# Patient Record
Sex: MALE | Race: WHITE | Employment: OTHER | RURAL
[De-identification: names, ages, dates, MRNs, and addresses within clinical notes are randomized per-mention and may not be internally consistent; named-entity substitution may affect disease eponyms.]

---

## 2017-02-06 ENCOUNTER — OFFICE VISIT (OUTPATIENT)
Dept: FAMILY MEDICINE CLINIC | Age: 82
End: 2017-02-06

## 2017-02-06 VITALS
DIASTOLIC BLOOD PRESSURE: 60 MMHG | HEART RATE: 68 BPM | RESPIRATION RATE: 18 BRPM | SYSTOLIC BLOOD PRESSURE: 126 MMHG | OXYGEN SATURATION: 99 %

## 2017-02-06 DIAGNOSIS — H60.503 ACUTE NONINFECTIVE OTITIS EXTERNA OF BOTH EARS, UNSPECIFIED TYPE: Primary | ICD-10-CM

## 2017-02-06 RX ORDER — NEOMYCIN SULFATE, POLYMYXIN B SULFATE AND HYDROCORTISONE 10; 3.5; 1 MG/ML; MG/ML; [USP'U]/ML
3 SUSPENSION/ DROPS AURICULAR (OTIC) 4 TIMES DAILY
Qty: 5 ML | Refills: 1 | Status: SHIPPED | OUTPATIENT
Start: 2017-02-06 | End: 2017-02-16

## 2017-02-18 NOTE — PROGRESS NOTES
2/18/2017    Chief Complaint   Patient presents with    Wax in Ear     Bilateral ears impacted with cerumen       HPI: Mr.Ernest Fairy Olszewski is a 80 y.o. male. 81 y/o resident of the [facility]. His wife is a resident in the [facility]. He has a history that is significant for chronic atrial fibrillation, implanted pacemaker, long term anticoagulation on Warfarin. Follows with Dr. Rocio Arroyo. His daughter Antonio Adames is the POA and is very attentive to his needs. Presents today with impacted cerumen bilaterally. Has been using Dubrox for past few days. Allergies   Allergen Reactions    Hydrocodone Nausea Only       Current Outpatient Prescriptions   Medication Sig Dispense Refill    rivaroxaban (XARELTO) 10 mg tablet Take 1 Tab by mouth daily. Indications: PREVENT THROMBOEMBOLISM IN CHRONIC ATRIAL FIBRILLATION 30 Tab 11    clopidogrel (PLAVIX) 75 mg tab Take 1 Tab by mouth daily. 30 Tab 11    atorvastatin (LIPITOR) 80 mg tablet Take 1 Tab by mouth daily. 30 Tab 11    famotidine (PEPCID) 20 mg tablet Take 1 Tab by mouth two (2) times a day. 30 Tab 11    albuterol (PROVENTIL VENTOLIN) 2.5 mg /3 mL (0.083 %) nebulizer solution 2.5 mg by Nebulization route as needed for Wheezing. Every 6 hrs prn SOB.  hydrocortisone (ANUSOL-HC) 2.5 % rectal cream Insert  into rectum as needed for Hemorrhoids.  docusate sodium (COLACE) 100 mg capsule Take 100 mg by mouth daily.  traMADol (ULTRAM) 50 mg tablet Take 50 mg by mouth every eight (8) hours as needed for Pain.  sodium chloride (OCEAN) 0.65 % nasal spray 2 Sprays by Both Nostrils route four (4) times daily.  calcium polycarbophil (FIBER-TABS) 625 mg tablet Take 1 Tab by mouth two (2) times a day. Indications: DIARRHEA 60 Tab 2    finasteride (PROSCAR) 5 mg tablet Take 1 Tab by mouth daily. Indications: SYMPTOMATIC BENIGN PROSTATIC HYPERPLASIA 30 Tab 5    Protein Supplement liqd Take 1 CUP by mouth two (2) times a day.  60 Bottle 5    cyanocobalamin (VITAMIN B12) 1,000 mcg/mL injection 1,000 mcg by IntraMUSCular route every month.  mineral oil (FLEET) enema Insert  into rectum as needed for Constipation.  glycerin, adult, (SUPPOSITORY ADULT) suppository Insert 1 Suppository into rectum as needed.  OTHER Spiritus Anant ([facility]) May keep wine in room.  multivitamin (ONE A DAY) tablet Take 1 Tab by mouth daily.  acetaminophen (TYLENOL) 325 mg tablet Take 325 mg by mouth two (2) times a day. Take two tabs po bid.  levothyroxine (SYNTHROID) 75 mcg tablet Take  by mouth Daily (before breakfast).  loratadine (CLARITIN) 10 mg tablet Take 10 mg by mouth daily as needed.  acetaminophen (TYLENOL) 80 mg suppository Insert 650 mg into rectum every four (4) hours as needed for Fever.  OTHER Natural Laxative 30cc po every day      magnesium hydroxide (CLEMENS MILK OF MAGNESIA) 400 mg/5 mL suspension Take 30 mL by mouth daily as needed for Constipation.  alum-mag hydroxide-simeth (MAALOX ADVANCED) 200-200-20 mg/5 mL susp Take 30 mL by mouth every four (4) hours as needed. Past Medical History   Diagnosis Date    Anemia     Arrhythmia      Afib    Depression     Hypercholesterolemia     Hypertension     NSTEMI (non-ST elevated myocardial infarction) ([facility] [place] [address] 11/21/2016     [facility]:  JACKELIN to SVG-L-PLB and JACKELIN to SVG-LAD    Pacemaker 2009     Followed By Dr Dianelys Miramontes S/P CABG x 4 1995    Thyroid disease      hypothyroidism       Lab Results   Component Value Date/Time    Glucose 98 12/15/2016 03:11 PM    Creatinine 1.20 12/15/2016 03:11 PM       ROS:  Constitutional: No fever, chills or weight loss  Respiratory: No cough, SOB   CV: No chest pain or Palpitations  GI: No nausea, vomiting or diarrhea. : No dysuria or hematuria. Neuro: No headaches, seizures, change in mental status.     Physical Exam:   VS Visit Vitals    /60    Pulse 68    Resp 18    SpO2 99%      Eyes Conjunctiva and lids normal.    PERRLA, EOMI.   ENMT Bilateral  External canals are impacted with cerumen. Irrigated with water until clear. The external canals are erythematous along the base. Lips, teeth, gums normal, mucous membranes moist.    Oropharynx: no erythema, no exudates, no lesions, normal tongue. NECK Thyroid: normal size, nontender. Trachea midline, neck symmetrical and without masses. Carotids 2+ with no bruits. No enlarged nodes. RESP Clear to auscultation and percussion. No rales, wheezes, rhonchi, or rubs. CV RRR, with no S3 or S4, no murmur, no rub. Aorta: no bruit. SKIN Skin warm, normal turgor. 1. Acute noninfective otitis externa of both ears, unspecified type  - neomycin-polymyxin-hydrocortisone, buffered, (PEDIOTIC) 3.5-10,000-1 mg/mL-unit/mL-% otic suspension; Administer 3 Drops in left ear four (4) times daily for 10 days. Dispense: 5 mL; Refill: 1        Orders Placed This Encounter    neomycin-polymyxin-hydrocortisone, buffered, (PEDIOTIC) 3.5-10,000-1 mg/mL-unit/mL-% otic suspension     Sig: Administer 3 Drops in left ear four (4) times daily for 10 days. Dispense:  5 mL     Refill:  1       Follow-up Disposition:  Return if symptoms worsen or fail to improve.         MARVA Coleman

## 2017-03-01 ENCOUNTER — OFFICE VISIT (OUTPATIENT)
Dept: FAMILY MEDICINE CLINIC | Age: 82
End: 2017-03-01

## 2017-03-01 VITALS
SYSTOLIC BLOOD PRESSURE: 140 MMHG | BODY MASS INDEX: 21.67 KG/M2 | OXYGEN SATURATION: 98 % | HEART RATE: 66 BPM | RESPIRATION RATE: 20 BRPM | WEIGHT: 151 LBS | DIASTOLIC BLOOD PRESSURE: 70 MMHG

## 2017-03-01 DIAGNOSIS — F33.8 SEASONAL AFFECTIVE DISORDER (HCC): Primary | ICD-10-CM

## 2017-03-01 DIAGNOSIS — J30.1 SEASONAL ALLERGIC RHINITIS DUE TO POLLEN: ICD-10-CM

## 2017-03-01 DIAGNOSIS — F32.0 MILD SINGLE CURRENT EPISODE OF MAJOR DEPRESSIVE DISORDER (HCC): ICD-10-CM

## 2017-03-01 DIAGNOSIS — R63.4 UNINTENTIONAL WEIGHT LOSS: ICD-10-CM

## 2017-03-01 RX ORDER — ESCITALOPRAM OXALATE 5 MG/1
5 TABLET ORAL DAILY
Qty: 30 TAB | Refills: 2 | Status: SHIPPED | OUTPATIENT
Start: 2017-03-01

## 2017-03-01 RX ORDER — LORATADINE 10 MG/1
10 TABLET ORAL DAILY
Qty: 30 TAB | Refills: 5 | Status: SHIPPED | OUTPATIENT
Start: 2017-03-01

## 2017-03-01 NOTE — PROGRESS NOTES
3/1/2017    Chief Complaint   Patient presents with    Depression     Feeling kind of blue about 1/2 the time. Not as active during the winter months and not playing cards with friends anymore. HPI: Omar Vicente is a 80 y.o. male. 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 Resident in [employer]. His wife is a resident in the [facility] due to Advanced Dementia with some behavioral issues. Very attentive daughter who visits almost every day. He has been an avid golfer. His daughter takes him when weather permits. He had a NSTEMI on 11/21/2016. He was admitted to [facility]. His cardiologist is Dr. Checo Mei. He has a history of atrial fib with PPM. He has no underlying rhythm and is paced 99.9% of time. He is on Xarelto and Plavix. He is going to Cardiac Rehab and is doing well. He should complete Cardiac Rehab at the end of March. Since the NSTEMI and admission in 11/16 he has lost some weight. His weight has stabilized around 150-154. He is getting Mighty Shakes BID. He has also had some dental problems which has made eating more difficult, especially things like meat. He does like milk shakes, the real kind, chocolate. Presents today for depression. He has been more depressed over the past couple months. Previously he was on Sertraline but it was discontinued due to unstable gait. Considered adding Mirtazapine for his weight but there were concerns about is cardiovascular side effects. Issues related to the depression include the fact that his wife and he are  with her in the [facility] and he in [address] [place] [address]. She often pleads with him not to leave which is hard on him. He also misses her companionship, ability to have a conversation or just be close. He would like to get out and play some golf. His daughter is concerned about his fall risk walking on the uneven ground when he walks from the cart to the ball lay.  Satya Martinez would like to defer golfing until he finishes cardiac rehab. The weather is warming up and that is reasonable. Another consideration is the affect of the gray winter with Seasonal Affect Disorder. Discussed options and he would like to try Lexapro. Would start a low dose. Consider a 3 month course and wean off. He has started to have some seasonal allergies. He has Claritin 10mg ordered PRN. Will schedule this for a couple of months. Allergies   Allergen Reactions    Hydrocodone Nausea Only       Current Outpatient Prescriptions   Medication Sig Dispense Refill    escitalopram oxalate (LEXAPRO) 5 mg tablet Take 1 Tab by mouth daily. 30 Tab 2    loratadine (CLARITIN) 10 mg tablet Take 1 Tab by mouth daily. Indications: ALLERGIC RHINITIS 30 Tab 5    sodium chloride (OCEAN) 0.65 % nasal spray 2 Sprays by Both Nostrils route four (4) times daily.  Protein Supplement liqd Take 1 CUP by mouth two (2) times a day. 60 Bottle 5    cyanocobalamin (VITAMIN B12) 1,000 mcg/mL injection 1,000 mcg by IntraMUSCular route every month.  rivaroxaban (XARELTO) 10 mg tablet Take 1 Tab by mouth daily. Indications: PREVENT THROMBOEMBOLISM IN CHRONIC ATRIAL FIBRILLATION 30 Tab 11    clopidogrel (PLAVIX) 75 mg tab Take 1 Tab by mouth daily. 30 Tab 11    atorvastatin (LIPITOR) 80 mg tablet Take 1 Tab by mouth daily. 30 Tab 11    famotidine (PEPCID) 20 mg tablet Take 1 Tab by mouth two (2) times a day. 30 Tab 11    albuterol (PROVENTIL VENTOLIN) 2.5 mg /3 mL (0.083 %) nebulizer solution 2.5 mg by Nebulization route as needed for Wheezing. Every 6 hrs prn SOB.  hydrocortisone (ANUSOL-HC) 2.5 % rectal cream Insert  into rectum as needed for Hemorrhoids.  docusate sodium (COLACE) 100 mg capsule Take 100 mg by mouth daily.  traMADol (ULTRAM) 50 mg tablet Take 50 mg by mouth every eight (8) hours as needed for Pain.  calcium polycarbophil (FIBER-TABS) 625 mg tablet Take 1 Tab by mouth two (2) times a day.  Indications: DIARRHEA 60 Tab 2    finasteride (PROSCAR) 5 mg tablet Take 1 Tab by mouth daily. Indications: SYMPTOMATIC BENIGN PROSTATIC HYPERPLASIA 30 Tab 5    mineral oil (FLEET) enema Insert  into rectum as needed for Constipation.  glycerin, adult, (SUPPOSITORY ADULT) suppository Insert 1 Suppository into rectum as needed.  OTHER [facility] ([facility]) May keep wine in room.  multivitamin (ONE A DAY) tablet Take 1 Tab by mouth daily.  acetaminophen (TYLENOL) 325 mg tablet Take 325 mg by mouth every four (4) hours as needed for Pain. Take two tabs po bid.  levothyroxine (SYNTHROID) 75 mcg tablet Take  by mouth Daily (before breakfast).  acetaminophen (TYLENOL) 80 mg suppository Insert 650 mg into rectum every four (4) hours as needed for Fever.  OTHER Natural Laxative 30cc po every day      magnesium hydroxide (CLEMENS MILK OF MAGNESIA) 400 mg/5 mL suspension Take 30 mL by mouth daily as needed for Constipation.  alum-mag hydroxide-simeth (MAALOX ADVANCED) 200-200-20 mg/5 mL susp Take 30 mL by mouth every four (4) hours as needed. Past Medical History:   Diagnosis Date    Anemia     Arrhythmia     Afib    Depression     Hypercholesterolemia     Hypertension     NSTEMI (non-ST elevated myocardial infarction) ([facility] [address] 11/21/2016    [facility]:  JACKELIN to SVG-L-PLB and JACKELIN to SVG-LAD    Pacemaker 2009    Followed By Dr Fam Villeda S/P CABG x 4 1995    Thyroid disease     hypothyroidism       Lab Results   Component Value Date/Time    Glucose 98 12/15/2016 03:11 PM    Creatinine 1.20 12/15/2016 03:11 PM       ROS:  Constitutional: No fever, chills or weight loss  Respiratory: No cough, SOB   CV: No chest pain or Palpitations  GI: No nausea, vomiting or diarrhea. : No dysuria or hematuria. Neuro: No headaches, seizures, change in mental status.     Physical Exam:   VS Visit Vitals    /70 (BP 1 Location: Right arm, BP Patient Position: Sitting)    Pulse 66    Resp 20    Wt 151 lb (68.5 kg)    SpO2 98%    BMI 21.67 kg/m2      Genreal  Alert, oriented WM. Ambulates with a rollator. Steady gait. He is here with Bird Mccoy his daughter. ENMT Mucous membranes moist.    Oropharynx: no erythema, no exudates, no lesions, normal tongue. RESP Clear to auscultation and percussion. No rales, wheezes, rhonchi, or rubs. CV RRR. EXT No deformity. Extremities without edema. SKIN Skin warm, normal turgor. NEURO Cranial nerves normal 2-12. No abnormal movement     PSYCH Judgment and insight fair. Some memory deficit but is quite cognizant. Oriented to person, place, and time. Affect is alert and attentive. 1. [facility]  Discussed and he would like to try medication. - escitalopram oxalate (LEXAPRO) 5 mg tablet; Take 1 Tab by mouth daily. Dispense: 30 Tab; Refill: 2    2. Mild single current episode of major depressive disorder ([facility] [address]  As above. Offer support. His wife may seem upset when he leaves, but she has a very poor short term memory and she will not remember that he has left. It is harder on him when he leaves because he worries and may even feel guilty like he is abandoning her. She is in the best place for care, she is safe and he is providing excellent care for her.     - escitalopram oxalate (LEXAPRO) 5 mg tablet; Take 1 Tab by mouth daily. Dispense: 30 Tab; Refill: 2    3. Seasonal allergic rhinitis due to pollen  Schedule Claritin for a couple of months. - loratadine (CLARITIN) 10 mg tablet; Take 1 Tab by mouth daily. Indications: ALLERGIC RHINITIS  Dispense: 30 Tab; Refill: 5    4. Weight loss  Regular chocolate milkshake in addition to the [employer] nutritional supplement. Orders Placed This Encounter    escitalopram oxalate (LEXAPRO) 5 mg tablet     Sig: Take 1 Tab by mouth daily. Dispense:  30 Tab     Refill:  2    loratadine (CLARITIN) 10 mg tablet     Sig: Take 1 Tab by mouth daily.  Indications: ALLERGIC RHINITIS     Dispense:  30 Tab     Refill:  5       Follow-up Disposition:  Return if symptoms worsen or fail to improve.     Time spent with patient 30min    Rosanna Her

## 2017-04-26 ENCOUNTER — OFFICE VISIT (OUTPATIENT)
Dept: FAMILY MEDICINE CLINIC | Age: 82
End: 2017-04-26

## 2017-04-26 VITALS
HEART RATE: 68 BPM | DIASTOLIC BLOOD PRESSURE: 51 MMHG | SYSTOLIC BLOOD PRESSURE: 113 MMHG | OXYGEN SATURATION: 96 % | RESPIRATION RATE: 20 BRPM | TEMPERATURE: 97.8 F

## 2017-04-26 DIAGNOSIS — H66.002 ACUTE SUPPURATIVE OTITIS MEDIA OF LEFT EAR WITHOUT SPONTANEOUS RUPTURE OF TYMPANIC MEMBRANE, RECURRENCE NOT SPECIFIED: Primary | ICD-10-CM

## 2017-04-26 RX ORDER — AMOXICILLIN AND CLAVULANATE POTASSIUM 875; 125 MG/1; MG/1
1 TABLET, FILM COATED ORAL 2 TIMES DAILY
Qty: 20 TAB | Refills: 0 | Status: SHIPPED | OUTPATIENT
Start: 2017-04-26 | End: 2017-05-06

## 2017-04-26 NOTE — PROGRESS NOTES
4/26/2017    Chief Complaint   Patient presents with    Wax in Ear     bilateral ear wax. HPI: Mr.Ernest Alejandra Purdy is a 80 y.o. male. 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 His wife is a resident in the [facility] due to Advanced Dementia with some behavioral issues. Very attentive daughter who visits almost every day. He has been an avid golfer. His daughter takes him when weather permits. He had a NSTEMI on 11/21/2016. He was admitted to [address] [place] [address]. His cardiologist is Dr. Azeem Terrell. He has a history of atrial fib with PPM. He has no underlying rhythm and is paced 99.9% of time. He is on Xarelto and Plavix. He is going to Cardiac Rehab and is doing well. He should complete Cardiac Rehab at the end of March. Since the NSTEMI and admission in 11/16 he has lost some weight. His weight has stabilized around 150-154. He is getting Mighty Shakes BID. He has also had some dental problems which has made eating more difficult, especially things like meat. He does like milk shakes, the real kind, chocolate. Presents today for left ear discomfort. Seen in clinic and told he had a little wax. He has been using Dubrox. He had an infection some time ago and it feels similar. Allergies   Allergen Reactions    Hydrocodone Nausea Only       Current Outpatient Prescriptions   Medication Sig Dispense Refill    amoxicillin-clavulanate (AUGMENTIN) 875-125 mg per tablet Take 1 Tab by mouth two (2) times a day for 10 days. 20 Tab 0    escitalopram oxalate (LEXAPRO) 5 mg tablet Take 1 Tab by mouth daily. 30 Tab 2    loratadine (CLARITIN) 10 mg tablet Take 1 Tab by mouth daily. Indications: ALLERGIC RHINITIS 30 Tab 5    rivaroxaban (XARELTO) 10 mg tablet Take 1 Tab by mouth daily. Indications: PREVENT THROMBOEMBOLISM IN CHRONIC ATRIAL FIBRILLATION (Patient taking differently: Take 15 mg by mouth daily.  Indications: PREVENT THROMBOEMBOLISM IN CHRONIC ATRIAL FIBRILLATION) 30 Tab 11    clopidogrel (PLAVIX) 75 mg tab Take 1 Tab by mouth daily. 30 Tab 11    atorvastatin (LIPITOR) 80 mg tablet Take 1 Tab by mouth daily. 30 Tab 11    famotidine (PEPCID) 20 mg tablet Take 1 Tab by mouth two (2) times a day. (Patient taking differently: Take 20 mg by mouth daily.) 30 Tab 11    calcium polycarbophil (FIBER-TABS) 625 mg tablet Take 1 Tab by mouth two (2) times a day. Indications: DIARRHEA 60 Tab 2    finasteride (PROSCAR) 5 mg tablet Take 1 Tab by mouth daily. Indications: SYMPTOMATIC BENIGN PROSTATIC HYPERPLASIA 30 Tab 5    Protein Supplement liqd Take 1 CUP by mouth two (2) times a day. 60 Bottle 5    cyanocobalamin (VITAMIN B12) 1,000 mcg/mL injection 1,000 mcg by IntraMUSCular route every month.  OTHER Spiritus Fermenti ([address] [place] [address]) May keep wine in room.  multivitamin (ONE A DAY) tablet Take 1 Tab by mouth daily.  levothyroxine (SYNTHROID) 75 mcg tablet Take  by mouth Daily (before breakfast).  albuterol (PROVENTIL VENTOLIN) 2.5 mg /3 mL (0.083 %) nebulizer solution 2.5 mg by Nebulization route as needed for Wheezing. Every 6 hrs prn SOB.  hydrocortisone (ANUSOL-HC) 2.5 % rectal cream Insert  into rectum as needed for Hemorrhoids.  traMADol (ULTRAM) 50 mg tablet Take 50 mg by mouth every eight (8) hours as needed for Pain.  sodium chloride (OCEAN) 0.65 % nasal spray 2 Sprays by Both Nostrils route four (4) times daily.  mineral oil (FLEET) enema Insert  into rectum as needed for Constipation.  glycerin, adult, (SUPPOSITORY ADULT) suppository Insert 1 Suppository into rectum as needed.  acetaminophen (TYLENOL) 325 mg tablet Take 325 mg by mouth every four (4) hours as needed for Pain. Take two tabs po bid.  acetaminophen (TYLENOL) 80 mg suppository Insert 650 mg into rectum every four (4) hours as needed for Fever.       OTHER Natural Laxative 30cc po every day      magnesium hydroxide (CLEMENS MILK OF MAGNESIA) 400 mg/5 mL suspension Take 30 mL by mouth daily as needed for Constipation.  alum-mag hydroxide-simeth (MAALOX ADVANCED) 200-200-20 mg/5 mL susp Take 30 mL by mouth every four (4) hours as needed. Past Medical History:   Diagnosis Date    Anemia     Arrhythmia     Afib    Depression     Hypercholesterolemia     Hypertension     NSTEMI (non-ST elevated myocardial infarction) ([facility] [address] 11/21/2016    [facility]:  JACKELIN to SVG-L-PLB and JACKELIN to SVG-LAD    Pacemaker 2009    Followed By Dr Chelsea Benoit S/P CABG x 4 1995    Thyroid disease     hypothyroidism       Lab Results   Component Value Date/Time    Glucose 98 12/15/2016 03:11 PM    Creatinine 1.20 12/15/2016 03:11 PM       ROS:  Constitutional: No fever, chills or weight loss  Respiratory: No cough, SOB   CV: No chest pain or Palpitations  GI: No nausea, vomiting or diarrhea. : No dysuria or hematuria. Neuro: No headaches, seizures, change in mental status. Physical Exam:   VS Visit Vitals    /51    Pulse 68    Temp 97.8 °F (36.6 °C)    Resp 20    SpO2 96%      General  Alert, oriented to person, place and time. Pleasantly confused. Eyes Conjunctiva and lids normal.    PERRLA, EOMI.   ENMT External ears and nose normal.  Left Canal has small amount of wax. Right canal is clear. Left ear irrigated and TM is bright erythema. Left TM normal.   Mucous membranes moist.    Oropharynx: no erythema, no exudates, no lesions, normal tongue. NECK Thyroid: normal size, nontender. Trachea midline, neck symmetrical and without masses. Carotids 2+ with no bruits. No enlarged nodes. RESP Clear to auscultation and percussion. No rales, wheezes, rhonchi, or rubs. CV RRR, with no S3 or S4, no murmur, no rub. SKIN Skin warm, normal turgor. NEURO Cranial nerves normal 2-12. PSYCH Judgment and insight good. Oriented to person, place, and time. Affect is alert and attentive.        1. Acute suppurative otitis media of left ear without spontaneous rupture of tympanic membrane, recurrence not specified  2. Impacted cerumen on left. Irrigated and TM reveals OM as noted. - amoxicillin-clavulanate (AUGMENTIN) 875-125 mg per tablet; Take 1 Tab by mouth two (2) times a day for 10 days. Dispense: 20 Tab; Refill: 0        Orders Placed This Encounter    amoxicillin-clavulanate (AUGMENTIN) 875-125 mg per tablet     Sig: Take 1 Tab by mouth two (2) times a day for 10 days. Dispense:  20 Tab     Refill:  0       Follow-up Disposition:  Return if symptoms worsen or fail to improve.         MARVA Beckwith

## 2017-12-07 ENCOUNTER — OFFICE VISIT (OUTPATIENT)
Dept: ONCOLOGY | Age: 82
End: 2017-12-07

## 2017-12-07 VITALS
DIASTOLIC BLOOD PRESSURE: 67 MMHG | SYSTOLIC BLOOD PRESSURE: 135 MMHG | BODY MASS INDEX: 22.13 KG/M2 | TEMPERATURE: 96.1 F | HEART RATE: 95 BPM | HEIGHT: 70 IN | WEIGHT: 154.6 LBS | RESPIRATION RATE: 16 BRPM

## 2017-12-07 DIAGNOSIS — D63.1 ANEMIA IN STAGE 3 CHRONIC KIDNEY DISEASE (HCC): ICD-10-CM

## 2017-12-07 DIAGNOSIS — I48.20 CHRONIC ATRIAL FIBRILLATION (HCC): Primary | ICD-10-CM

## 2017-12-07 DIAGNOSIS — N18.30 ANEMIA IN STAGE 3 CHRONIC KIDNEY DISEASE (HCC): ICD-10-CM

## 2017-12-07 DIAGNOSIS — Z95.1 S/P CABG X 4: ICD-10-CM

## 2017-12-07 DIAGNOSIS — D50.9 IRON DEFICIENCY ANEMIA, UNSPECIFIED IRON DEFICIENCY ANEMIA TYPE: ICD-10-CM

## 2017-12-07 DIAGNOSIS — I21.4 NSTEMI (NON-ST ELEVATED MYOCARDIAL INFARCTION) (HCC): ICD-10-CM

## 2017-12-07 RX ORDER — NITROGLYCERIN 0.4 MG/1
TABLET SUBLINGUAL
COMMUNITY

## 2017-12-07 NOTE — PROGRESS NOTES
Kwadwo Wilkerson is a 80 y.o. male here today to see Dr concerning anemia. 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

## 2017-12-07 NOTE — MR AVS SNAPSHOT
Visit Information Date & Time Provider Department Dept. Phone Encounter #  
 12/7/2017  2:00 PM Avinash Read MD [facility] at [address] [place] [address] [phone] [id number] Follow-up Instructions Return in 4 days (on 12/11/2017) for Office visit. Your Appointments 12/11/2017  9:30 AM  
Follow Up with Avinash Read MD  
[facility] at [address] [place] [address]) Appt Note: 4Day F/U  
 [address] [facility] [phone] [id number] [phone]  
  
   
 [address] Upcoming Health Maintenance Date Due Pneumococcal 65+ Low/Medium Risk (2 of 2 - PPSV23) 9/11/2016 MEDICARE YEARLY EXAM 3/18/2017 Influenza Age 5 to Adult 8/1/2017 GLAUCOMA SCREENING Q2Y 8/20/2019 DTaP/Tdap/Td series (2 - Td) 8/20/2027 Allergies as of 12/7/2017  Review Complete On: 12/7/2017 By: Zoey Valenzuela RN Severity Noted Reaction Type Reactions Hydrocodone  05/06/2015    Nausea Only Current Immunizations  Never Reviewed Name Date Influenza Vaccine 10/15/2015 Pneumococcal Conjugate (PCV-13) 9/11/2015 Not reviewed this visit You Were Diagnosed With   
  
 Codes Comments Chronic atrial fibrillation (HCC)    -  Primary ICD-10-CM: O90.8 ICD-9-CM: 427.31   
 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  
 22.18 kg/m 2 1.86 m 2 Preferred Pharmacy Pharmacy Name Phone [address] [facility]. [phone] Your Updated Medication List  
  
   
This list is accurate as of: 12/7/17  4:23 PM.  Always use your most recent med list.  
  
  
  
  
 albuterol 2.5 mg /3 mL (0.083 %) nebulizer solution Commonly known as:  PROVENTIL VENTOLIN  
2.5 mg by Nebulization route as needed for Wheezing. Every 6 hrs prn SOB. atorvastatin 80 mg tablet Commonly known as:  LIPITOR Take 1 Tab by mouth daily. calcium polycarbophil 625 mg tablet Commonly known as:  FIBER-TABS Take 1 Tab by mouth two (2) times a day. Indications: DIARRHEA  
  
 clopidogrel 75 mg Tab Commonly known as:  PLAVIX Take 1 Tab by mouth daily. cyanocobalamin 1,000 mcg/mL injection Commonly known as:  VITAMIN B12  
1,000 mcg by IntraMUSCular route every month. Every 3 weeks  
  
 escitalopram oxalate 5 mg tablet Commonly known as:  Barbara Levo Take 1 Tab by mouth daily. famotidine 20 mg tablet Commonly known as:  PEPCID Take 1 Tab by mouth two (2) times a day. Ferrous Fumarate 325 mg (106 mg iron) Tab Take  by mouth daily. finasteride 5 mg tablet Commonly known as:  PROSCAR Take 1 Tab by mouth daily. Indications: SYMPTOMATIC BENIGN PROSTATIC HYPERPLASIA  
  
 hydrocortisone 2.5 % rectal cream  
Commonly known as:  ANUSOL-HC Insert  into rectum as needed for Hemorrhoids. loratadine 10 mg tablet Commonly known as:  Iza Cleveland Take 1 Tab by mouth daily. Indications: ALLERGIC RHINITIS MAALOX ADVANCED 200-200-20 mg/5 mL Susp Generic drug:  alum-mag hydroxide-simeth Take 30 mL by mouth every four (4) hours as needed. mineral oil enema Commonly known as:  FLEET Insert  into rectum as needed for Constipation. multivitamin tablet Commonly known as:  ONE A DAY Take 1 Tab by mouth daily. nitroglycerin 0.4 mg SL tablet Commonly known as:  NITROSTAT  
by SubLINGual route every five (5) minutes as needed for Chest Pain (not to exceed 3 doses). * OTHER Spiritus Fermenti ([facility]) May keep wine in room. * OTHER Natural Laxative 30cc po every day CLEMENS MILK OF MAGNESIA 400 mg/5 mL suspension Generic drug:  magnesium hydroxide Take 30 mL by mouth daily as needed for Constipation. Protein Supplement Liqd Take 1 CUP by mouth two (2) times a day. rivaroxaban 10 mg tablet Commonly known as:  Donovan Panda Take 1 Tab by mouth daily. Indications: PREVENT THROMBOEMBOLISM IN CHRONIC ATRIAL FIBRILLATION  
  
 sodium chloride 0.65 % nasal spray Commonly known as:  OCEAN  
2 Sprays by Both Nostrils route four (4) times daily. SUPPOSITORY ADULT suppository Generic drug:  glycerin (adult) Insert 1 Suppository into rectum as needed. SYNTHROID 75 mcg tablet Generic drug:  levothyroxine Take  by mouth Daily (before breakfast). traMADol 50 mg tablet Commonly known as:  ULTRAM  
Take 50 mg by mouth every eight (8) hours as needed for Pain. * TYLENOL 325 mg tablet Generic drug:  acetaminophen Take 325 mg by mouth every four (4) hours as needed for Pain. Take two tabs po bid. * acetaminophen 80 mg suppository Commonly known as:  TYLENOL Insert 650 mg into rectum every four (4) hours as needed for Fever. * Notice: This list has 4 medication(s) that are the same as other medications prescribed for you. Read the directions carefully, and ask your doctor or other care provider to review them with you. Follow-up Instructions Return in 4 days (on 12/11/2017) for Office visit. Introducing [facility]! Priscilla Banda introduces [employer] patient portal. Now you can access parts of your medical record, email your doctor's office, and request medication refills online.    
 
1. In your internet browser, go to [email or web address] 2. Click on the First Time User? Click Here link in the Sign In box. You will see the New Member Sign Up page. 3. Enter your TalkPlus Access Code exactly as it appears below. You will not need to use this code after youve completed the sign-up process. If you do not sign up before the expiration date, you must request a new code. · TalkPlus Access Code: [email or web address] Expires: 2/13/2018  2:55 PM 
 
4. Enter the last four digits of your Social Security Number (xxxx) and Date of Birth (mm/dd/yyyy) as indicated and click Submit. You will be taken to the next sign-up page. 5. Create a Big Contactst ID. This will be your TalkPlus login ID and cannot be changed, so think of one that is secure and easy to remember. 6. Create a TalkPlus password. You can change your password at any time. 7. Enter your Password Reset Question and Answer. This can be used at a later time if you forget your password. 8. Enter your e-mail address. You will receive e-mail notification when new information is available in [address] 9. Click Sign Up. You can now view and download portions of your medical record. 10. Click the Download Summary menu link to download a portable copy of your medical information. If you have questions, please visit the Frequently Asked Questions section of the TalkPlus website. Remember, TalkPlus is NOT to be used for urgent needs. For medical emergencies, dial 911. Now available from your iPhone and Android! Please provide this summary of care documentation to your next provider. Your primary care clinician is listed as Shea Castillo. If you have any questions after today's visit, please call [phone].

## 2017-12-07 NOTE — PROGRESS NOTES
Socrates Alvarez is a 80 y.o. male who presents to the office today for the following:  Chief Complaint   Patient presents with   Jose Alberto Cazares Anemia       Referring Provider:  Gabby Grayson NP    HPIThis gentleman was noted by his primary care provider to have a worsening anemia recently. His hemoglobin (Hb) was running around 10 g/dl and has been stable with elevated creatinines for some time but on 11/10 he fell and severely injured his left knee without a break. The Hb was 8.8 on 11/20 and 8.2 on 11/29. His MCV's were. normal as were the RDW's which started to rise a bit as of the 29th. His Hb was 10.1 on 12/17/16. After he was noted to have the drop in Hb he was started on oral Fe. Iron levels are hard to interpret because when they were drawn he was already on iron. The serum Fe was 35 and the Sat was only 12%, TIBC was low normal at 298  and ferritin was normal at 105. His creatinine was 1.37, a bit higher than usual. The iron picture looks a lot like anemia of acute injury  (the acute form of anemia of chronic disease). The question is how much associated true Fe deficiency there is. The chronic anemia is mainly due to anemia of chronic renal disease. He has no history of GI bleed. Stool for occult blood was recently done at his care facility. We don't have that result yet. A urine was also done. Daughter says she thinks that there were some blood stains on the anterior aspect of his underpants. Recently his daughter notes that he has been more fatigued and has dyspnea on exertion. We were concerned that with a Hb of 8.2 last week it could be worse and may be affecting him cardiacwise. He had a NSTEMI last year and nati has a pacemaker and Hx of atrial fibrillation and is on Xarelto and Plavix. We were going to give him 1 unit of pRBC's tomorrow but his Hb returned at 9. 9.today.                    CURRENT TREATMENT:    Past Medical History:   Diagnosis Date    Anemia     Arrhythmia     Afib    Depression     Hypercholesterolemia     Hypertension     NSTEMI (non-ST elevated myocardial infarction) ([facility] 11/21/2016    [facility]:  JACKELIN to SVG-L-PLB and JACKELIN to SVG-LAD    Pacemaker 2009    Followed By Dr Gaurav Sanford S/P CABG x 4 1995    Thyroid disease     hypothyroidism     Past Surgical History:   Procedure Laterality Date    CARDIAC SURG PROCEDURE UNLIST      pacer    CARDIAC SURG PROCEDURE UNLIST  07/20/1995    quadruple bypass     HX CORONARY ARTERY BYPASS GRAFT  1995    4V    HX PACEMAKER  04/23/2003       No flowsheet data found.]          Key Oncology Meds     Patient is on no Oncologic meds. Allergies   Allergen Reactions    Hydrocodone Nausea Only       TRANSFUSIONS: None     Social History     Social History    Marital status:      Spouse name: N/A    Number of children: N/A    Years of education: N/A     Social History Main Topics    Smoking status: Former Smoker    Smokeless tobacco: Never Used    Alcohol use 4.2 oz/week     7 Glasses of wine per week    Drug use: No    Sexual activity: Not Asked     Other Topics Concern    None     Social History Narrative   . Family History   Problem Relation Age of Onset    Glaucoma Mother     Heart Disease Father     Heart Disease Sister        Review of Systems   Constitutional: Positive for malaise/fatigue. HENT: Negative. Eyes: Negative. Respiratory: Positive for shortness of breath and wheezing. Negative for cough and hemoptysis. Cardiovascular: Negative for chest pain and palpitations. Gastrointestinal: Positive for diarrhea and heartburn. Negative for abdominal pain, blood in stool, melena, nausea and vomiting. Genitourinary: Negative for dysuria and hematuria. Skin: Negative. Neurological: Negative for tingling, sensory change, speech change, focal weakness, seizures and headaches. Endo/Heme/Allergies: Bruises/bleeds easily.        Physical Exam   Constitutional: He is well-developed, well-nourished, and in no distress. No distress. HENT:   Head: Normocephalic and atraumatic. Right Ear: External ear normal.   Left Ear: External ear normal.   Mouth/Throat: Oropharynx is clear and moist. No oropharyngeal exudate. Eyes: Conjunctivae and EOM are normal. Right eye exhibits no discharge. Left eye exhibits no discharge. No scleral icterus. Pupils = but don't react   Neck: No tracheal deviation present. No thyromegaly present. Cardiovascular: Normal rate and regular rhythm. Exam reveals no gallop and no friction rub. Murmur heard. Crescendo decrescendo systolic murmur is present with a grade of 3/6   Pulmonary/Chest: Effort normal and breath sounds normal. No stridor. No respiratory distress. He has no wheezes. He has no rales. Abdominal: Soft. Bowel sounds are normal. He exhibits no distension. There is no hepatosplenomegaly. There is no tenderness. There is no rebound and no guarding. Musculoskeletal: He exhibits no edema or deformity. Left knee: He exhibits swelling. Tenderness found. Lymphadenopathy:     He has no cervical adenopathy. Neurological: He is alert. No cranial nerve deficit. Coordination and gait abnormal.   Unsteady on feet. Skin: No rash noted. He is not diaphoretic. Psychiatric: Mood and affect normal.     Visit Vitals    /67    Pulse 95    Temp 96.1 °F (35.6 °C) (Oral)    Resp 16    Ht 5' 10\" (1.778 m)    Wt 154 lb 9.6 oz (70.1 kg)    BMI 22.18 kg/m2         ASSESSMENT:  No diagnosis found. XR Results (maximum last 3): Results from [place] encounter on 11/15/17   XR KNEE LT 3 V   Narrative LEFT KNEE, 3 VIEWS    INDICATION:  Pain. COMPARISON:  None. FINDINGS:    There is no acute fracture or dislocation. Prepatellar soft tissue swelling  noted. There is a small suprapatellar effusion. Bone mineralization is normal.  The 3 knee compartments are preserved. There is considerable arterial  calcification noted. Impression IMPRESSION:    Prepatellar soft tissue swelling and small suprapatellar effusion. Results from Abstract encounter on 08/21/17   XR FOOT LT MIN 3 V  Results from Abstract encounter on 09/08/16   XR CHEST PA LAT    CT Results (maximum last 3): No results found for this or any previous visit. MRI Results (maximum last 3): No results found for this or any previous visit. Nuclear Medicine Results (maximum last 3): No results found for this or any previous visit. US Results (maximum last 3): No results found for this or any previous visit. RAMIREZ Results (most recent):  No results found for this or any previous visit. VAS/US Results (maximum last 3): No results found for this or any previous visit. PET Results (maximum last 3): No results found for this or any previous visit. Results for orders placed or performed during the hospital encounter of 54/14/90   METABOLIC PANEL, COMPREHENSIVE   Result Value Ref Range    Sodium 143 136 - 145 mmol/L    Potassium 4.0 3.5 - 5.1 mmol/L    Chloride 109 (H) 97 - 108 mmol/L    CO2 23 21 - 32 mmol/L    Anion gap 11 5 - 15 mmol/L    Glucose 96 65 - 100 mg/dL    BUN 27 (H) 7.0 - 18.0 MG/DL    Creatinine 1.37 (H) 0.70 - 1.30 MG/DL    BUN/Creatinine ratio 20 12 - 20      GFR est AA 59 (L) >60 ml/min/1.73m2    GFR est non-AA 48 (L) >60 ml/min/1.73m2    Calcium 8.4 (L) 8.5 - 10.1 MG/DL    Bilirubin, total 0.4 0.2 - 1.0 MG/DL    ALT (SGPT) 18 14 - 63 U/L    AST (SGOT) 20 15 - 37 U/L    Alk. phosphatase 114 45 - 117 U/L    Protein, total 6.3 (L) 6.4 - 8.2 g/dL    Albumin 3.2 (L) 3.5 - 5.0 g/dL    Globulin 3.1 2.0 - 4.0 g/dL    A-G Ratio 1.0 (L) 1.1 - 2.2       Orders Placed This Encounter    Ferrous Fumarate 325 mg (106 mg iron) tab     Sig: Take  by mouth daily.  nitroglycerin (NITROSTAT) 0.4 mg SL tablet     Sig: by SubLINGual route every five (5) minutes as needed for Chest Pain (not to exceed 3 doses).      ASSESSMENT:    1. Multifactorial anemia - anemia of chronic renal disease, anemia of acute injury and inflammation due to knee injury, ? Iron deficiency if so why?  2.ASHD,CAD, Heart Block pacemaker, Hx of atrial fibrillation on Xarelto and Plavix. 3.Increasing debility and fatigue ? Etiology. This is not usually due to a Hb over 10 g/dl. 4. Improvement of Hb may be due to oral iron plus decreasing inflammation in knee and influence of infammatory effect on iron transport. Plan (Comment)  1. :We are holding transfusion, checking on stool test for blood. GI W/U? Hopefully not  2. Check EPO, retic  3. Will see how he stabilizes and whether he needs procrit in the future. 4. Keep on Fe for now. Goal to get stable and get off iron especially if stool test is negative, clarify need for continuous oral Fe and why. 5.SPEP Renal failure & anemia. Monitor renal status. F/U 12/18/17.       Follow-up Disposition: Not on File    Yadi Yang MD

## 2017-12-18 ENCOUNTER — OFFICE VISIT (OUTPATIENT)
Dept: ONCOLOGY | Age: 82
End: 2017-12-18

## 2017-12-18 VITALS
BODY MASS INDEX: 22.28 KG/M2 | OXYGEN SATURATION: 97 % | DIASTOLIC BLOOD PRESSURE: 54 MMHG | SYSTOLIC BLOOD PRESSURE: 113 MMHG | HEIGHT: 70 IN | RESPIRATION RATE: 20 BRPM | WEIGHT: 155.6 LBS | HEART RATE: 71 BPM

## 2017-12-18 DIAGNOSIS — D63.1 ANEMIA IN STAGE 3 CHRONIC KIDNEY DISEASE (HCC): Primary | ICD-10-CM

## 2017-12-18 DIAGNOSIS — D50.9 IRON DEFICIENCY ANEMIA, UNSPECIFIED IRON DEFICIENCY ANEMIA TYPE: ICD-10-CM

## 2017-12-18 DIAGNOSIS — E88.09 HYPOALBUMINEMIA: ICD-10-CM

## 2017-12-18 DIAGNOSIS — N18.30 ANEMIA IN STAGE 3 CHRONIC KIDNEY DISEASE (HCC): Primary | ICD-10-CM

## 2017-12-18 NOTE — PROGRESS NOTES
Lisa Mcclure is a 80 y.o. male denies complaint. Fell 11/10/17 red area which is slightly red on L knee.

## 2017-12-18 NOTE — PROGRESS NOTES
Socrates Alvarez is a 80 y.o. male who presents to the office today for the following:  Chief Complaint   Patient presents with   Jose Alberto Sage Anemia       Referring Provider:  Gabby Grayson NP    HPI Patient is back to see how he is doing on Ferrous fumarate and is tolerating it ok and FE Sis up but hemoglobin is down to 9.3. He is abit dyspneic on relatively little exertion. CURRENT TREATMENT:    Past Medical History:   Diagnosis Date    Anemia     Arrhythmia     Afib    Depression     Hypercholesterolemia     Hypertension     NSTEMI (non-ST elevated myocardial infarction) ([facility] [address] 11/21/2016    [facility]:  JACKELIN to SVG-L-PLB and JACKELIN to SVG-LAD    Pacemaker 2009    Followed By Dr Rocio Magallanes S/P CABG x 4 1995    Thyroid disease     hypothyroidism     Past Surgical History:   Procedure Laterality Date    CARDIAC SURG PROCEDURE UNLIST      pacer    CARDIAC SURG PROCEDURE UNLIST  07/20/1995    quadruple bypass     HX CORONARY ARTERY BYPASS GRAFT  1995    4V    HX PACEMAKER  04/23/2003       No flowsheet data found.]          Key Oncology Meds     Patient is on no Oncologic meds. Allergies   Allergen Reactions    Hydrocodone Nausea Only       TRANSFUSIONS: None     Social History     Social History    Marital status:      Spouse name: N/A    Number of children: N/A    Years of education: N/A     Social History Main Topics    Smoking status: Former Smoker    Smokeless tobacco: Never Used    Alcohol use 4.2 oz/week     7 Glasses of wine per week    Drug use: No    Sexual activity: Not Asked     Other Topics Concern    None     Social History Narrative   . Family History   Problem Relation Age of Onset    Glaucoma Mother     Heart Disease Father     Heart Disease Sister        Review of Systems   Constitutional: Negative. HENT: Negative. Eyes: Negative. Respiratory: Positive for shortness of breath. Negative for cough, hemoptysis and wheezing.     Cardiovascular: Negative for chest pain, palpitations, orthopnea and PND. Gastrointestinal: Negative. Genitourinary: Negative for dysuria and frequency. Musculoskeletal: Negative. Skin: Negative. Neurological: Negative for focal weakness. Physical Exam   Constitutional: He is well-developed, well-nourished, and in no distress. No distress. HENT:   Head: Normocephalic and atraumatic. Mouth/Throat: No oropharyngeal exudate. Neck: No tracheal deviation present. No thyromegaly present. Cardiovascular: Normal rate and regular rhythm. Pulmonary/Chest: Effort normal and breath sounds normal.   Abdominal: Soft. There is no tenderness. Musculoskeletal: He exhibits no edema. Skin: He is not diaphoretic. Visit Vitals    /54    Pulse 71    Resp 20    Ht 5' 10\" (1.778 m)    Wt 155 lb 9.6 oz (70.6 kg)    SpO2 97%    BMI 22.33 kg/m2         ASSESSMENT:  1. Anemia in stage 3 chronic kidney disease    2. Iron deficiency anemia, unspecified iron deficiency anemia type        XR Results (maximum last 3): Results from [place] encounter on 11/15/17   XR KNEE LT 3 V   Narrative LEFT KNEE, 3 VIEWS    INDICATION:  Pain. COMPARISON:  None. FINDINGS:    There is no acute fracture or dislocation. Prepatellar soft tissue swelling  noted. There is a small suprapatellar effusion. Bone mineralization is normal.  The 3 knee compartments are preserved. There is considerable arterial  calcification noted. Impression IMPRESSION:    Prepatellar soft tissue swelling and small suprapatellar effusion. Results from Abstract encounter on 08/21/17   XR FOOT LT MIN 3 V  Results from Abstract encounter on 09/08/16   XR CHEST PA LAT    CT Results (maximum last 3): No results found for this or any previous visit. MRI Results (maximum last 3): No results found for this or any previous visit. Nuclear Medicine Results (maximum last 3): No results found for this or any previous visit.     US Results (maximum last 3): No results found for this or any previous visit. RAMIREZ Results (most recent):  No results found for this or any previous visit. VAS/US Results (maximum last 3): No results found for this or any previous visit. PET Results (maximum last 3): No results found for this or any previous visit. Results for orders placed or performed during the hospital encounter of 46/16/04   METABOLIC PANEL, COMPREHENSIVE   Result Value Ref Range    Sodium 146 (H) 136 - 145 mmol/L    Potassium 4.1 3.5 - 5.1 mmol/L    Chloride 112 (H) 97 - 108 mmol/L    CO2 25 21 - 32 mmol/L    Anion gap 9 5 - 15 mmol/L    Glucose 89 65 - 100 mg/dL    BUN 27 (H) 7.0 - 18.0 MG/DL    Creatinine 1.31 (H) 0.70 - 1.30 MG/DL    BUN/Creatinine ratio 21 (H) 12 - 20      GFR est AA >60 >60 ml/min/1.73m2    GFR est non-AA 51 (L) >60 ml/min/1.73m2    Calcium 8.8 8.5 - 10.1 MG/DL    Bilirubin, total 0.4 0.2 - 1.0 MG/DL    ALT (SGPT) 19 14 - 63 U/L    AST (SGOT) 21 15 - 37 U/L    Alk. phosphatase 114 45 - 117 U/L    Protein, total 6.9 6.4 - 8.2 g/dL    Albumin 3.4 (L) 3.5 - 5.0 g/dL    Globulin 3.5 2.0 - 4.0 g/dL    A-G Ratio 1.0 (L) 1.1 - 2.2     CBC WITH AUTOMATED DIFF   Result Value Ref Range    WBC 5.4 4.1 - 11.1 K/uL    RBC 3.16 (L) 4.10 - 5.70 M/uL    HGB 9.3 (L) 12.1 - 17.0 g/dL    HCT 31.1 (L) 36.6 - 50.3 %    MCV 98.4 80.0 - 99.0 FL    MCH 29.4 26.0 - 34.0 PG    MCHC 29.9 (L) 30.0 - 36.5 g/dL    RDW 16.0 (H) 11.5 - 14.5 %    PLATELET 097 (L) 456 - 400 K/uL    NEUTROPHILS 53 32 - 75 %    LYMPHOCYTES 21 12 - 49 %    MONOCYTES 13 5 - 13 %    EOSINOPHILS 13 (H) 0 - 7 %    BASOPHILS 0 0 - 1 %    ABS. NEUTROPHILS 2.8 1.8 - 8.0 K/UL    ABS. LYMPHOCYTES 1.1 0.8 - 3.5 K/UL    ABS. MONOCYTES 0.7 0.0 - 1.0 K/UL    ABS. EOSINOPHILS 0.7 (H) 0.0 - 0.4 K/UL    ABS.  BASOPHILS 0.0 0.0 - 0.1 K/UL   LD   Result Value Ref Range     85 - 241 U/L     Orders Placed This Encounter    neomycin-bacitracin-polymyxin (TRIPLE ANTIBIOTIC) 3.5mg-400 unit- 5,000 unit/gram ointment     Sig: Apply  to affected area as needed. 1. Anemia, multifactorial but anemia chronic renal disease is treatable, suggetion of iron deficiency. Why? A stool for occult blood was done at living facility said to be negative. Patient is on Xarelto and Plavix due to Hx of atrial fibrillation and CAD. I started Ferrous fumarate 324 mg to get the iron Sat up. Despite this and the improvement in his knee  (chronic inflammation) his hemoglobin is dropping to 9.3g/dl. 2 Renal dysfunction ? Etiology probably hypertension. Plan (Comment): Discussed Procrit today advantages and concerns. If properly managed and HGB kept in the range of 10 g/dl it is unlikely we will have trouble woith thrombotic episodes but if his HGB falls there may be difficulty with his heart. He is already having difficulty at HGB of 9.3 with YOUNG, Sees Cardiology soon Question EF?     Follow-up Disposition: Not on File    Avinash Read MD

## 2017-12-18 NOTE — MR AVS SNAPSHOT
Visit Information Date & Time Provider Department Dept. Phone Encounter #  
 12/18/2017  9:30 AM Vielka Cuba MD [facility] [phone] [id number] Follow-up Instructions Return in 2 weeks (on 1/3/2018) for Office visit. Upcoming Health Maintenance Date Due Pneumococcal 65+ Low/Medium Risk (2 of 2 - PPSV23) 9/11/2016 MEDICARE YEARLY EXAM 3/18/2017 Influenza Age 5 to Adult 8/1/2017 GLAUCOMA SCREENING Q2Y 8/20/2019 DTaP/Tdap/Td series (2 - Td) 8/20/2027 Allergies as of 12/18/2017  Review Complete On: 12/18/2017 By: Jen Temple RN Severity Noted Reaction Type Reactions Hydrocodone  05/06/2015    Nausea Only Current Immunizations  Reviewed on 12/18/2017 Name Date Influenza High Dose Vaccine PF 11/6/2017 Influenza Vaccine 10/15/2015 Pneumococcal Conjugate (PCV-13) 9/11/2015 Reviewed by Jen Temple RN on 12/18/2017 at  9:37 AM  
You Were Diagnosed With   
  
 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 
ICD-9-CM: 273.8 Vitals BP Pulse Resp Height(growth percentile) Weight(growth percentile) SpO2  
 113/54 71 20 5' 10\" (1.778 m) 155 lb 9.6 oz (70.6 kg) 97% BMI Smoking Status 22.33 kg/m2 Former Smoker Vitals History BMI and BSA Data Body Mass Index Body Surface Area  
 22.33 kg/m 2 1.87 m 2 Preferred Pharmacy Pharmacy Name Phone [address], [address] [phone] Your Updated Medication List  
  
   
This list is accurate as of: 12/18/17 10:50 AM.  Always use your most recent med list.  
  
  
  
  
 albuterol 2.5 mg /3 mL (0.083 %) nebulizer solution Commonly known as:  PROVENTIL VENTOLIN  
2.5 mg by Nebulization route as needed for Wheezing. Every 6 hrs prn SOB. atorvastatin 80 mg tablet Commonly known as:  LIPITOR Take 1 Tab by mouth daily. calcium polycarbophil 625 mg tablet Commonly known as:  FIBER-TABS Take 1 Tab by mouth two (2) times a day. Indications: DIARRHEA  
  
 clopidogrel 75 mg Tab Commonly known as:  PLAVIX Take 1 Tab by mouth daily. cyanocobalamin 1,000 mcg/mL injection Commonly known as:  VITAMIN B12  
1,000 mcg by IntraMUSCular route every month. Every 3 weeks  
  
 escitalopram oxalate 5 mg tablet Commonly known as:  Arther Putty Take 1 Tab by mouth daily. famotidine 20 mg tablet Commonly known as:  PEPCID Take 1 Tab by mouth two (2) times a day. Ferrous Fumarate 325 mg (106 mg iron) Tab Take  by mouth daily. finasteride 5 mg tablet Commonly known as:  PROSCAR Take 1 Tab by mouth daily. Indications: SYMPTOMATIC BENIGN PROSTATIC HYPERPLASIA  
  
 hydrocortisone 2.5 % rectal cream  
Commonly known as:  ANUSOL-HC Insert  into rectum as needed for Hemorrhoids. loratadine 10 mg tablet Commonly known as:  Rosan [place] Take 1 Tab by mouth daily. Indications: ALLERGIC RHINITIS MAALOX ADVANCED 200-200-20 mg/5 mL Susp Generic drug:  alum-mag hydroxide-simeth Take 30 mL by mouth every four (4) hours as needed. mineral oil enema Commonly known as:  FLEET Insert  into rectum as needed for Constipation. multivitamin tablet Commonly known as:  ONE A DAY Take 1 Tab by mouth daily. nitroglycerin 0.4 mg SL tablet Commonly known as:  NITROSTAT  
by SubLINGual route every five (5) minutes as needed for Chest Pain (not to exceed 3 doses). * OTHER Spiritus Fermenti ([facility] [place]) May keep wine in room. * OTHER Natural Laxative 30cc po every day CLEMENS MILK OF MAGNESIA 400 mg/5 mL suspension Generic drug:  magnesium hydroxide Take 30 mL by mouth daily as needed for Constipation. Protein Supplement Liqd Take 1 CUP by mouth two (2) times a day. rivaroxaban 10 mg tablet Commonly known as:  Cornelio Apples Take 1 Tab by mouth daily. Indications: PREVENT THROMBOEMBOLISM IN CHRONIC ATRIAL FIBRILLATION  
  
 sodium chloride 0.65 % nasal spray Commonly known as:  OCEAN  
2 Sprays by Both Nostrils route four (4) times daily. SUPPOSITORY ADULT suppository Generic drug:  glycerin (adult) Insert 1 Suppository into rectum as needed. SYNTHROID 75 mcg tablet Generic drug:  levothyroxine Take  by mouth Daily (before breakfast). traMADol 50 mg tablet Commonly known as:  ULTRAM  
Take 50 mg by mouth every eight (8) hours as needed for Pain. TRIPLE ANTIBIOTIC 3.5mg-400 unit- 5,000 unit/gram ointment Generic drug:  neomycin-bacitracin-polymyxin Apply  to affected area as needed. * TYLENOL 325 mg tablet Generic drug:  acetaminophen Take 325 mg by mouth every four (4) hours as needed for Pain. Take two tabs po bid. * acetaminophen 80 mg suppository Commonly known as:  TYLENOL Insert 650 mg into rectum every four (4) hours as needed for Fever. * Notice: This list has 4 medication(s) that are the same as other medications prescribed for you. Read the directions carefully, and ask your doctor or other care provider to review them with you. Follow-up Instructions Return in 2 weeks (on 1/3/2018) for Office visit. Introducing [facility] & HEALTH SERVICES! Romayne Duster introduces [employer] patient portal. Now you can access parts of your medical record, email your doctor's office, and request medication refills online. 1. In your internet browser, go to [email or web address] 2. Click on the First Time User? Click Here link in the Sign In box. You will see the New Member Sign Up page. 3. Enter your [employer] Access Code exactly as it appears below.  You will not need to use this code after youve completed the sign-up process. If you do not sign up before the expiration date, you must request a new code. · [employer] Access Code: [email or web address] Expires: 2/13/2018  2:55 PM 
 
4. Enter the last four digits of your Social Security Number (xxxx) and Date of Birth (mm/dd/yyyy) as indicated and click Submit. You will be taken to the next sign-up page. 5. Create a [employer] ID. This will be your [employer] login ID and cannot be changed, so think of one that is secure and easy to remember. 6. Create a [employer] password. You can change your password at any time. 7. Enter your Password Reset Question and Answer. This can be used at a later time if you forget your password. 8. Enter your e-mail address. You will receive e-mail notification when new information is available in [address] 9. Click Sign Up. You can now view and download portions of your medical record. 10. Click the Download Summary menu link to download a portable copy of your medical information. If you have questions, please visit the Frequently Asked Questions section of the [employer] website. Remember, [employer] is NOT to be used for urgent needs. For medical emergencies, dial 911. Now available from your iPhone and Android! Please provide this summary of care documentation to your next provider. Your primary care clinician is listed as Lore Bonilla. If you have any questions after today's visit, please call [phone].

## 2018-01-03 ENCOUNTER — OFFICE VISIT (OUTPATIENT)
Dept: ONCOLOGY | Age: 83
End: 2018-01-03

## 2018-01-03 VITALS
HEART RATE: 79 BPM | RESPIRATION RATE: 16 BRPM | BODY MASS INDEX: 21.99 KG/M2 | OXYGEN SATURATION: 100 % | SYSTOLIC BLOOD PRESSURE: 138 MMHG | WEIGHT: 153.6 LBS | HEIGHT: 70 IN | DIASTOLIC BLOOD PRESSURE: 66 MMHG

## 2018-01-03 DIAGNOSIS — D63.1 ANEMIA IN STAGE 3 CHRONIC KIDNEY DISEASE (HCC): ICD-10-CM

## 2018-01-03 DIAGNOSIS — D50.9 IRON DEFICIENCY ANEMIA, UNSPECIFIED IRON DEFICIENCY ANEMIA TYPE: Primary | ICD-10-CM

## 2018-01-03 DIAGNOSIS — N18.30 ANEMIA IN STAGE 3 CHRONIC KIDNEY DISEASE (HCC): ICD-10-CM

## 2018-01-03 RX ORDER — FACIAL-BODY WIPES
10 EACH TOPICAL AS NEEDED
COMMUNITY

## 2018-01-03 NOTE — MR AVS SNAPSHOT
Visit Information Date & Time Provider Department Dept. Phone Encounter #  
 1/3/2018 11:30 AM Kalia Moreno MD [facility] [phone] [id number] Follow-up Instructions Return in 2 weeks (on 1/17/2018) for Office visit. Upcoming Health Maintenance Date Due Pneumococcal 65+ Low/Medium Risk (2 of 2 - PPSV23) 9/11/2016 MEDICARE YEARLY EXAM 3/18/2017 GLAUCOMA SCREENING Q2Y 8/20/2019 DTaP/Tdap/Td series (2 - Td) 8/20/2027 Allergies as of 1/3/2018  Review Complete On: 1/3/2018 By: Emi Charlton RN Severity Noted Reaction Type Reactions Hydrocodone  05/06/2015    Nausea Only Current Immunizations  Reviewed on 1/3/2018 Name Date Influenza High Dose Vaccine PF 11/6/2017 Influenza Vaccine 10/15/2015 Pneumococcal Conjugate (PCV-13) 9/11/2015 Reviewed by Emi Charlton RN on 1/3/2018 at 11:30 AM  
You Were Diagnosed With   
  
 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  
 138/66 79 16 5' 10\" (1.778 m) 153 lb 9.6 oz (69.7 kg) 100% BMI Smoking Status 22.04 kg/m2 Former Smoker BMI and BSA Data Body Mass Index Body Surface Area 22.04 kg/m 2 1.86 m 2 Preferred Pharmacy Pharmacy Name Phone [address], [address] [facility]. [phone] Your Updated Medication List  
  
   
This list is accurate as of: 1/3/18  1:01 PM.  Always use your most recent med list.  
  
  
  
  
 albuterol 2.5 mg /3 mL (0.083 %) nebulizer solution Commonly known as:  PROVENTIL VENTOLIN  
2.5 mg by Nebulization route as needed for Wheezing. Every 6 hrs prn SOB. atorvastatin 80 mg tablet Commonly known as:  LIPITOR Take 1 Tab by mouth daily. calcium polycarbophil 625 mg tablet Commonly known as:  FIBER-TABS Take 1 Tab by mouth two (2) times a day. Indications: DIARRHEA  
  
 clopidogrel 75 mg Tab Commonly known as:  PLAVIX Take 1 Tab by mouth daily. cyanocobalamin 1,000 mcg/mL injection Commonly known as:  VITAMIN B12  
1,000 mcg by IntraMUSCular route every month. Every 3 weeks DULCOLAX (BISACODYL) 10 mg suppository Generic drug:  bisacodyl Insert 10 mg into rectum as needed. escitalopram oxalate 5 mg tablet Commonly known as:  Ky Lemmings Take 1 Tab by mouth daily. famotidine 20 mg tablet Commonly known as:  PEPCID Take 1 Tab by mouth two (2) times a day. Ferrous Fumarate 325 mg (106 mg iron) Tab Take  by mouth daily. finasteride 5 mg tablet Commonly known as:  PROSCAR Take 1 Tab by mouth daily. Indications: SYMPTOMATIC BENIGN PROSTATIC HYPERPLASIA  
  
 hydrocortisone 2.5 % rectal cream  
Commonly known as:  ANUSOL-HC Insert  into rectum as needed for Hemorrhoids. loratadine 10 mg tablet Commonly known as:  Analy Maria G Take 1 Tab by mouth daily. Indications: ALLERGIC RHINITIS MAALOX ADVANCED 200-200-20 mg/5 mL Susp Generic drug:  alum-mag hydroxide-simeth Take 30 mL by mouth every four (4) hours as needed. mineral oil enema Commonly known as:  FLEET Insert  into rectum as needed for Constipation. multivitamin tablet Commonly known as:  ONE A DAY Take 1 Tab by mouth daily. nitroglycerin 0.4 mg SL tablet Commonly known as:  NITROSTAT  
by SubLINGual route every five (5) minutes as needed for Chest Pain (not to exceed 3 doses). * OTHER Spiritus Fermenti ([facility] [place]) May keep wine in room. * OTHER Natural Laxative 30cc po every day CLEMENS MILK OF MAGNESIA 400 mg/5 mL suspension Generic drug:  magnesium hydroxide Take 30 mL by mouth daily as needed for Constipation. Protein Supplement Liqd Take 1 CUP by mouth two (2) times a day. * XARELTO 15 mg Tab tablet Generic drug:  rivaroxaban Take  by mouth daily. * rivaroxaban 10 mg tablet Commonly known as:  Sahil Gallagher Take 1 Tab by mouth daily. Indications: PREVENT THROMBOEMBOLISM IN CHRONIC ATRIAL FIBRILLATION  
  
 sodium chloride 0.65 % nasal spray Commonly known as:  OCEAN  
2 Sprays by Both Nostrils route four (4) times daily. SUPPOSITORY ADULT suppository Generic drug:  glycerin (adult) Insert 1 Suppository into rectum as needed. SYNTHROID 75 mcg tablet Generic drug:  levothyroxine Take  by mouth Daily (before breakfast). traMADol 50 mg tablet Commonly known as:  ULTRAM  
Take 50 mg by mouth every eight (8) hours as needed for Pain. TRIPLE ANTIBIOTIC 3.5mg-400 unit- 5,000 unit/gram ointment Generic drug:  neomycin-bacitracin-polymyxin Apply  to affected area as needed. * TYLENOL 325 mg tablet Generic drug:  acetaminophen Take 325 mg by mouth every four (4) hours as needed for Pain. Take two tabs po bid. * acetaminophen 80 mg suppository Commonly known as:  TYLENOL Insert 650 mg into rectum every four (4) hours as needed for Fever. * Notice: This list has 6 medication(s) that are the same as other medications prescribed for you. Read the directions carefully, and ask your doctor or other care provider to review them with you. Follow-up Instructions Return in 2 weeks (on 1/17/2018) for Office visit. Introducing [facility] & HEALTH SERVICES! [facility] introduces [employer] patient portal. Now you can access parts of your medical record, email your doctor's office, and request medication refills online. 1. In your internet browser, go to [email or web address] 2. Click on the First Time User? Click Here link in the Sign In box.  You will see the New Member Sign Up page. 3. Enter your [employer] Access Code exactly as it appears below. You will not need to use this code after youve completed the sign-up process. If you do not sign up before the expiration date, you must request a new code. · [employer] Access Code: [email or web address] Expires: 2/13/2018  2:55 PM 
 
4. Enter the last four digits of your Social Security Number (xxxx) and Date of Birth (mm/dd/yyyy) as indicated and click Submit. You will be taken to the next sign-up page. 5. Create a Linkpasst ID. This will be your [employer] Days login ID and cannot be changed, so think of one that is secure and easy to remember. 6. Create a [employer] Days password. You can change your password at any time. 7. Enter your Password Reset Question and Answer. This can be used at a later time if you forget your password. 8. Enter your e-mail address. You will receive e-mail notification when new information is available in [address] 9. Click Sign Up. You can now view and download portions of your medical record. 10. Click the Download Summary menu link to download a portable copy of your medical information. If you have questions, please visit the Frequently Asked Questions section of the [employer] website. Remember, [employer] is NOT to be used for urgent needs. For medical emergencies, dial 911. Now available from your iPhone and Android! Please provide this summary of care documentation to your next provider. Your primary care clinician is listed as Genevieve Driscoll. If you have any questions after today's visit, please call [phone].

## 2018-01-03 NOTE — PROGRESS NOTES
Barbara Vargas is a 80 y.o. male who presents to the office today for the following:  Chief Complaint   Patient presents with   [employer] Anemia       Referring Provider:  Genevieve Driscoll NP    HPI The patient is doing well,comes in to check how he is doing on 20,000 units of Procrit for the last 2 weeks. Last Hb was up to 9.8 and he is still on ferrous fumarate 324 mg BID without complaint. Still notes YOUNG. OFF Plavix on Xarelto. CURRENT TREATMENT: Procrit and Ferrous fumarate. Past Medical History:   Diagnosis Date    Anemia     Arrhythmia     Afib    Chronic kidney disease     Depression     Hypercholesterolemia     Hypertension     NSTEMI (non-ST elevated myocardial infarction) ([facility] [address] 11/21/2016    [facility]:  JACKELIN to SVG-L-PLB and JACKELIN to SVG-LAD    Pacemaker 2009    Followed By Dr Angelique Mckinney S/P CABG x 4 1995    Thyroid disease     hypothyroidism     Past Surgical History:   Procedure Laterality Date    CARDIAC SURG PROCEDURE UNLIST      pacer    CARDIAC SURG PROCEDURE UNLIST  07/20/1995    quadruple bypass     HX CORONARY ARTERY BYPASS GRAFT  1995    4V    HX PACEMAKER  04/23/2003       No flowsheet data found.]          Key Oncology Meds     Patient is on no Oncologic meds. Allergies   Allergen Reactions    Hydrocodone Nausea Only       TRANSFUSIONS: None     Social History     Social History    Marital status:      Spouse name: N/A    Number of children: N/A    Years of education: N/A     Social History Main Topics    Smoking status: Former Smoker    Smokeless tobacco: Never Used    Alcohol use 4.2 oz/week     7 Glasses of wine per week    Drug use: No    Sexual activity: Not Asked     Other Topics Concern    None     Social History Narrative   . Family History   Problem Relation Age of Onset    Glaucoma Mother     Heart Disease Father     Heart Disease Sister        Review of Systems   Constitutional: Negative. HENT: Negative. Eyes: Negative. Respiratory: Positive for cough and shortness of breath. Negative for hemoptysis and sputum production. Coughing after eating but swallowing ok   Cardiovascular: Negative for chest pain and palpitations. Gastrointestinal: Positive for heartburn. Negative for blood in stool and diarrhea. Genitourinary: Negative for hematuria. Musculoskeletal: Negative. Skin: Negative. Neurological: Negative for sensory change. Psychiatric/Behavioral:        Vivid dreams       Physical Exam   Constitutional: He is well-developed, well-nourished, and in no distress. No distress. HENT:   Head: Normocephalic and atraumatic. Mouth/Throat: Oropharynx is clear and moist. No oropharyngeal exudate. Eyes: No scleral icterus. Neck: No tracheal deviation present. No thyromegaly present. Cardiovascular: Normal rate and regular rhythm. Murmur heard. Pulmonary/Chest: No respiratory distress. He has no wheezes. He has rales. Abdominal: Soft. He exhibits no distension and no mass. There is no hepatosplenomegaly. There is no tenderness. There is no rebound and no guarding. Musculoskeletal: He exhibits no tenderness. Lymphadenopathy:     He has no cervical adenopathy. Skin: He is not diaphoretic. Psychiatric: Mood and affect normal.     Visit Vitals    /66    Pulse 79    Resp 16    Ht 5' 10\" (1.778 m)    Wt 153 lb 9.6 oz (69.7 kg)    SpO2 100%    BMI 22.04 kg/m2         ASSESSMENT:  No diagnosis found. XR Results (maximum last 3): Results from [place] encounter on 11/15/17   XR KNEE LT 3 V   Narrative LEFT KNEE, 3 VIEWS    INDICATION:  Pain. COMPARISON:  None. FINDINGS:    There is no acute fracture or dislocation. Prepatellar soft tissue swelling  noted. There is a small suprapatellar effusion. Bone mineralization is normal.  The 3 knee compartments are preserved. There is considerable arterial  calcification noted.          Impression IMPRESSION:    Prepatellar soft tissue swelling and small suprapatellar effusion. Results from [facility] encounter on 08/21/17   XR FOOT LT MIN 3 V  Results from Abstract encounter on 09/08/16   XR CHEST PA LAT    CT Results (maximum last 3): No results found for this or any previous visit. MRI Results (maximum last 3): No results found for this or any previous visit. Nuclear Medicine Results (maximum last 3): No results found for this or any previous visit. US Results (maximum last 3): No results found for this or any previous visit. RAMIREZ Results (most recent):  No results found for this or any previous visit. VAS/US Results (maximum last 3): No results found for this or any previous visit. PET Results (maximum last 3): No results found for this or any previous visit. Results for orders placed or performed during the hospital encounter of 12/26/17   CBC WITH AUTOMATED DIFF   Result Value Ref Range    WBC 5.1 4.1 - 11.1 K/uL    RBC 3.34 (L) 4.10 - 5.70 M/uL    HGB 9.8 (L) 12.1 - 17.0 g/dL    HCT 32.7 (L) 36.6 - 50.3 %    MCV 97.9 80.0 - 99.0 FL    MCH 29.3 26.0 - 34.0 PG    MCHC 30.0 30.0 - 36.5 g/dL    RDW 16.0 (H) 11.5 - 14.5 %    PLATELET 075 208 - 172 K/uL    NEUTROPHILS 52 32 - 75 %    LYMPHOCYTES 21 12 - 49 %    MONOCYTES 12 5 - 13 %    EOSINOPHILS 15 (H) 0 - 7 %    BASOPHILS 0 0 - 1 %    ABS. NEUTROPHILS 2.6 1.8 - 8.0 K/UL    ABS. LYMPHOCYTES 1.1 0.8 - 3.5 K/UL    ABS. MONOCYTES 0.6 0.0 - 1.0 K/UL    ABS. EOSINOPHILS 0.8 (H) 0.0 - 0.4 K/UL    ABS. BASOPHILS 0.0 0.0 - 0.1 K/UL     Orders Placed This Encounter    bisacodyl (DULCOLAX, BISACODYL,) 10 mg suppository     Sig: Insert 10 mg into rectum as needed.  rivaroxaban (XARELTO) 15 mg tab tablet     Sig: Take  by mouth daily. 1. Anemia of chronic renal disease improved, Hb today 10.2  2. Iron levels up. Did not take iron pills this AM      Plan (Comment): Hold Procrit this week and see him in 2 weeks.  If HB is down , may say increase Procrit to 40,000 units and shoot for a dose Q 2 weeks  Will probably stop iron after next visit and monitor level going forward      Follow-up Disposition: Not on Liz Cardoza MD

## 2018-01-17 ENCOUNTER — OFFICE VISIT (OUTPATIENT)
Dept: ONCOLOGY | Age: 83
End: 2018-01-17

## 2018-01-17 VITALS
SYSTOLIC BLOOD PRESSURE: 107 MMHG | TEMPERATURE: 96.4 F | HEIGHT: 70 IN | DIASTOLIC BLOOD PRESSURE: 53 MMHG | BODY MASS INDEX: 22.5 KG/M2 | HEART RATE: 94 BPM | WEIGHT: 157.2 LBS | RESPIRATION RATE: 17 BRPM

## 2018-01-17 DIAGNOSIS — D63.1 ANEMIA IN STAGE 3 CHRONIC KIDNEY DISEASE (HCC): ICD-10-CM

## 2018-01-17 DIAGNOSIS — N18.30 ANEMIA IN STAGE 3 CHRONIC KIDNEY DISEASE (HCC): ICD-10-CM

## 2018-01-17 DIAGNOSIS — I21.4 NSTEMI (NON-ST ELEVATED MYOCARDIAL INFARCTION) (HCC): ICD-10-CM

## 2018-01-17 DIAGNOSIS — I48.20 CHRONIC ATRIAL FIBRILLATION (HCC): Primary | ICD-10-CM

## 2018-01-17 DIAGNOSIS — D50.9 IRON DEFICIENCY ANEMIA, UNSPECIFIED IRON DEFICIENCY ANEMIA TYPE: ICD-10-CM

## 2018-01-17 NOTE — MR AVS SNAPSHOT
[address] [place] [id number] [phone] Patient: Yobany Steward MRN: [id number] RNG:[id number]/34/2317 Visit Information Date & Time Provider Department Dept. Phone Encounter #  
 1/17/2018  9:30 AM Giuliana Souza MD [facility] [phone] [id number] Follow-up Instructions Return in about 2 weeks (around 1/31/2018), or cbc on 1/24/18, for Office visit. Upcoming Health Maintenance Date Due Pneumococcal 65+ Low/Medium Risk (2 of 2 - PPSV23) 9/11/2016 MEDICARE YEARLY EXAM 3/18/2017 GLAUCOMA SCREENING Q2Y 8/20/2019 DTaP/Tdap/Td series (2 - Td) 8/20/2027 Allergies as of 1/17/2018  Review Complete On: 1/17/2018 By: Giuliana Souza MD  
  
 Severity Noted Reaction Type Reactions Hydrocodone  05/06/2015    Nausea Only Current Immunizations  Reviewed on 1/17/2018 Name Date Influenza High Dose Vaccine PF 11/6/2017 Influenza Vaccine 10/15/2015 Pneumococcal Conjugate (PCV-13) 9/11/2015 Reviewed by Cheryle Rushing, RN on 1/17/2018 at  9:13 AM  
 Reviewed by Raheem Seay RN on 1/17/2018 at  9:37 AM  
You Were Diagnosed With   
  
 Codes Comments Chronic atrial fibrillation (HCC)    -  Primary ICD-10-CM: T04.7 ICD-9-CM: 427.31 Iron deficiency anemia, unspecified iron deficiency anemia type     ICD-10-CM: D50.9 ICD-9-CM: 280.9 NSTEMI (non-ST elevated myocardial infarction) ([facility] [address]     ICD-10-CM: I21.4 ICD-9-CM: 410.70 Anemia in stage 3 chronic kidney disease     ICD-10-CM: N18.3, D63.1 ICD-9-CM: 285.21, 585.3 Vitals BP Pulse Temp Resp Height(growth percentile) Weight(growth percentile) 107/53 94 96.4 °F (35.8 °C) (Oral) 17 5' 10\" (1.778 m) 157 lb 3.2 oz (71.3 kg) BMI Smoking Status 22.56 kg/m2 Former Smoker BMI and BSA Data  Body Mass Index Body Surface Area  
 22.56 kg/m 2 1.88 m 2  
  
  
 Preferred Pharmacy Pharmacy Name Phone [address] [facility]. [phone] Your Updated Medication List  
  
   
This list is accurate as of: 1/17/18 10:16 AM.  Always use your most recent med list.  
  
  
  
  
 albuterol 2.5 mg /3 mL (0.083 %) nebulizer solution Commonly known as:  PROVENTIL VENTOLIN  
2.5 mg by Nebulization route as needed for Wheezing. Every 6 hrs prn SOB. atorvastatin 80 mg tablet Commonly known as:  LIPITOR Take 1 Tab by mouth daily. calcium polycarbophil 625 mg tablet Commonly known as:  FIBER-TABS Take 1 Tab by mouth two (2) times a day. Indications: DIARRHEA  
  
 clopidogrel 75 mg Tab Commonly known as:  PLAVIX Take 1 Tab by mouth daily. cyanocobalamin 1,000 mcg/mL injection Commonly known as:  VITAMIN B12  
1,000 mcg by IntraMUSCular route every month. Every 3 weeks DULCOLAX (BISACODYL) 10 mg suppository Generic drug:  bisacodyl Insert 10 mg into rectum as needed. escitalopram oxalate 5 mg tablet Commonly known as:  Madyson Caroli Take 1 Tab by mouth daily. famotidine 20 mg tablet Commonly known as:  PEPCID Take 1 Tab by mouth two (2) times a day. Ferrous Fumarate 325 mg (106 mg iron) Tab Take  by mouth daily. finasteride 5 mg tablet Commonly known as:  PROSCAR Take 1 Tab by mouth daily. Indications: SYMPTOMATIC BENIGN PROSTATIC HYPERPLASIA  
  
 hydrocortisone 2.5 % rectal cream  
Commonly known as:  ANUSOL-HC Insert  into rectum as needed for Hemorrhoids. loratadine 10 mg tablet Commonly known as:  Lajune Citlalli Take 1 Tab by mouth daily. Indications: ALLERGIC RHINITIS MAALOX ADVANCED 200-200-20 mg/5 mL Susp Generic drug:  alum-mag hydroxide-simeth Take 30 mL by mouth every four (4) hours as needed. mineral oil enema Commonly known as:  FLEET Insert  into rectum as needed for Constipation. multivitamin tablet Commonly known as:  ONE A DAY Take 1 Tab by mouth daily. nitroglycerin 0.4 mg SL tablet Commonly known as:  NITROSTAT  
by SubLINGual route every five (5) minutes as needed for Chest Pain (not to exceed 3 doses). * OTHER Spiritus Fermenti ([facility]) May keep wine in room. * OTHER Natural Laxative 30cc po every day CLEMENS MILK OF MAGNESIA 400 mg/5 mL suspension Generic drug:  magnesium hydroxide Take 30 mL by mouth daily as needed for Constipation. Protein Supplement Liqd Take 1 CUP by mouth two (2) times a day. * XARELTO 15 mg Tab tablet Generic drug:  rivaroxaban Take  by mouth daily. * rivaroxaban 10 mg tablet Commonly known as:  Ignacia Manus Take 1 Tab by mouth daily. Indications: PREVENT THROMBOEMBOLISM IN CHRONIC ATRIAL FIBRILLATION  
  
 sodium chloride 0.65 % nasal spray Commonly known as:  OCEAN  
2 Sprays by Both Nostrils route four (4) times daily. SUPPOSITORY ADULT suppository Generic drug:  glycerin (adult) Insert 1 Suppository into rectum as needed. SYNTHROID 75 mcg tablet Generic drug:  levothyroxine Take  by mouth Daily (before breakfast). traMADol 50 mg tablet Commonly known as:  ULTRAM  
Take 50 mg by mouth every eight (8) hours as needed for Pain. TRIPLE ANTIBIOTIC 3.5mg-400 unit- 5,000 unit/gram ointment Generic drug:  neomycin-bacitracin-polymyxin Apply  to affected area as needed. * TYLENOL 325 mg tablet Generic drug:  acetaminophen Take 325 mg by mouth every four (4) hours as needed for Pain. Take two tabs po bid. * acetaminophen 80 mg suppository Commonly known as:  TYLENOL Insert 650 mg into rectum every four (4) hours as needed for Fever. * Notice: This list has 6 medication(s) that are the same as other medications prescribed for you. Read the directions carefully, and ask your doctor or other care provider to review them with you. Follow-up Instructions Return in about 2 weeks (around 1/31/2018), or cbc on 1/24/18, for Office visit. To-Do List   
 01/24/2018 9:00 AM  
  Appointment with Kwame Giron 2 at [facility] ([phone]) Introducing [facility]! [employer] introduces [employer] patient portal. Now you can access parts of your medical record, email your doctor's office, and request medication refills online. 1. In your internet browser, go to [email or web address] 2. Click on the First Time User? Click Here link in the Sign In box. You will see the New Member Sign Up page. 3. Enter your [employer] Access Code exactly as it appears below. You will not need to use this code after youve completed the sign-up process. If you do not sign up before the expiration date, you must request a new code. · [employer] Access Code: [email or web address] Expires: 2/13/2018  2:55 PM 
 
4. Enter the last four digits of your Social Security Number (xxxx) and Date of Birth (mm/dd/yyyy) as indicated and click Submit. You will be taken to the next sign-up page. 5. Create a [employer] ID. This will be your [employer] login ID and cannot be changed, so think of one that is secure and easy to remember. 6. Create a [employer] password. You can change your password at any time. 7. Enter your Password Reset Question and Answer. This can be used at a later time if you forget your password. 8. Enter your e-mail address. You will receive e-mail notification when new information is available in [address] 9. Click Sign Up. You can now view and download portions of your medical record. 10. Click the Download Summary menu link to download a portable copy of your medical information. If you have questions, please visit the Frequently Asked Questions section of the [employer] website. Remember, [employer] is NOT to be used for urgent needs. For medical emergencies, dial 911. Now available from your iPhone and Android! Please provide this summary of care documentation to your next provider. Your primary care clinician is listed as Brent Miguel. If you have any questions after today's visit, please call [phone].

## 2018-01-17 NOTE — PROGRESS NOTES
Lucille Copeland is a 80 y.o. male who presents to the office today for the following:  Chief Complaint   Patient presents with    Anemia       Referring Provider:  JEM Guerrero      CURRENT TREATMENT:    Past Medical History:   Diagnosis Date    Anemia     Arrhythmia     Afib    Chronic kidney disease     Depression     Hypercholesterolemia     Hypertension     NSTEMI (non-ST elevated myocardial infarction) ([facility] [address] 11/21/2016    [facility]:  JACKELIN to SVG-L-PLB and JACKELIN to SVG-LAD    Pacemaker 2009    Followed By Dr Gaetano House S/P CABG x 4 1995    Thyroid disease     hypothyroidism     Past Surgical History:   Procedure Laterality Date    CARDIAC SURG PROCEDURE UNLIST      pacer    CARDIAC SURG PROCEDURE UNLIST  07/20/1995    quadruple bypass     HX CORONARY ARTERY BYPASS GRAFT  1995    4V    HX PACEMAKER  04/23/2003       No flowsheet data found.]          Key Oncology Meds     Patient is on no Oncologic meds. Allergies   Allergen Reactions    Hydrocodone Nausea Only       TRANSFUSIONS: None     Social History     Social History    Marital status:      Spouse name: N/A    Number of children: N/A    Years of education: N/A     Social History Main Topics    Smoking status: Former Smoker    Smokeless tobacco: Never Used    Alcohol use 4.2 oz/week     7 Glasses of wine per week    Drug use: No    Sexual activity: Not Asked     Other Topics Concern    None     Social History Narrative   . Family History   Problem Relation Age of Onset    Glaucoma Mother     Heart Disease Father     Heart Disease Sister        Review of Systems   Constitutional: Negative for chills, diaphoresis and fever. HENT: Negative. Eyes: Negative. Respiratory: Negative. Cardiovascular: Negative for chest pain and palpitations. Gastrointestinal: Negative. Genitourinary: Negative. Musculoskeletal: Negative. Skin: Negative.     Neurological: Negative for speech change and focal weakness. Physical Exam   Constitutional: He is well-developed, well-nourished, and in no distress. No distress. Cardiovascular: Normal rate, regular rhythm and normal heart sounds. Exam reveals no gallop and no friction rub. No murmur heard. Pulmonary/Chest: Effort normal and breath sounds normal. No respiratory distress. He has no wheezes. He has no rales. Skin: He is not diaphoretic. Vitals reviewed. Visit Vitals    /53    Pulse 94    Temp 96.4 °F (35.8 °C) (Oral)    Resp 17    Ht 5' 10\" (1.778 m)    Wt 157 lb 3.2 oz (71.3 kg)    BMI 22.56 kg/m2         ASSESSMENT:  1. Chronic atrial fibrillation ([facility] [address]    2. Iron deficiency anemia, unspecified iron deficiency anemia type    3. NSTEMI (non-ST elevated myocardial infarction) ([facility] [address]    4. Anemia in stage 3 chronic kidney disease        XR Results (maximum last 3): Results from [place] encounter on 11/15/17   XR KNEE LT 3 V   Narrative LEFT KNEE, 3 VIEWS    INDICATION:  Pain. COMPARISON:  None. FINDINGS:    There is no acute fracture or dislocation. Prepatellar soft tissue swelling  noted. There is a small suprapatellar effusion. Bone mineralization is normal.  The 3 knee compartments are preserved. There is considerable arterial  calcification noted. Impression IMPRESSION:    Prepatellar soft tissue swelling and small suprapatellar effusion. Results from Abstract encounter on 08/21/17   XR FOOT LT MIN 3 V  Results from Abstract encounter on 09/08/16   XR CHEST PA LAT    CT Results (maximum last 3): No results found for this or any previous visit. MRI Results (maximum last 3): No results found for this or any previous visit. Nuclear Medicine Results (maximum last 3): No results found for this or any previous visit. US Results (maximum last 3): No results found for this or any previous visit. RAMIREZ Results (most recent):  No results found for this or any previous visit.     VAS/US Results (maximum last 3): No results found for this or any previous visit. PET Results (maximum last 3): No results found for this or any previous visit. Results for orders placed or performed during the hospital encounter of 01/17/18   CBC WITH AUTOMATED DIFF   Result Value Ref Range    WBC 5.4 4.1 - 11.1 K/uL    RBC 3.49 (L) 4.10 - 5.70 M/uL    HGB 10.3 (L) 12.1 - 17.0 g/dL    HCT 35.0 (L) 36.6 - 50.3 %    .3 (H) 80.0 - 99.0 FL    MCH 29.5 26.0 - 34.0 PG    MCHC 29.4 (L) 30.0 - 36.5 g/dL    RDW 14.4 11.5 - 14.5 %    PLATELET 216 368 - 509 K/uL    NEUTROPHILS 48 32 - 75 %    LYMPHOCYTES 29 12 - 49 %    MONOCYTES 11 5 - 13 %    EOSINOPHILS 12 (H) 0 - 7 %    BASOPHILS 0 0 - 1 %    ABS. NEUTROPHILS 2.6 1.8 - 8.0 K/UL    ABS. LYMPHOCYTES 1.6 0.8 - 3.5 K/UL    ABS. MONOCYTES 0.6 0.0 - 1.0 K/UL    ABS. EOSINOPHILS 0.6 (H) 0.0 - 0.4 K/UL    ABS. BASOPHILS 0.0 0.0 - 0.1 K/UL     No orders of the defined types were placed in this encounter. Plan (Comment):       Follow-up Disposition: Not on File    Mariann Trejo MD

## 2018-01-17 NOTE — PROGRESS NOTES
Yanni Recinos is a 80 y.o. male who presents to the office today for the following:  Chief Complaint   Patient presents with    Anemia       Referring Provider:  Pillo Morrell NP    HPI  Mr. Adrianna Ahumada returns. He is being followed for chronic renal disease. He had Procrit on 12/19/17 and 12/26/18 and since that time, his hemoglobin has been over 10 and it is again today. On 12/26/17, his hemoglobin was 9.8, on 1/3/18 it was 10.2 and today is 10.3. There is no need for Procrit today. There was some confusion in the medications stating that he was taking 10 mg of Xarelto, it is actually 15 mg. He basically is feeling well and having no complaints. He is here with his daughter. MedDATA/gwo           CURRENT TREATMENT:    Past Medical History:   Diagnosis Date    Anemia     Arrhythmia     Afib    Chronic kidney disease     Depression     Hypercholesterolemia     Hypertension     NSTEMI (non-ST elevated myocardial infarction) ([facility] [address] 11/21/2016    [facility]:  JACKELIN to SVG-L-PLB and JACKELIN to SVG-LAD    Pacemaker 2009    Followed By Dr Nancy Meade S/P CABG x 4 1995    Thyroid disease     hypothyroidism     Past Surgical History:   Procedure Laterality Date    CARDIAC SURG PROCEDURE UNLIST      pacer    CARDIAC SURG PROCEDURE UNLIST  07/20/1995    quadruple bypass     HX CORONARY ARTERY BYPASS GRAFT  1995    4V    HX PACEMAKER  04/23/2003       No flowsheet data found.]          Key Oncology Meds     Patient is on no Oncologic meds.               Allergies   Allergen Reactions    Hydrocodone Nausea Only       TRANSFUSIONS: None     Social History     Social History    Marital status:      Spouse name: N/A    Number of children: N/A    Years of education: N/A     Social History Main Topics    Smoking status: Former Smoker    Smokeless tobacco: Never Used    Alcohol use 4.2 oz/week     7 Glasses of wine per week    Drug use: No    Sexual activity: Not Asked     Other Topics Concern    None Social History Narrative   . Family History   Problem Relation Age of Onset    Glaucoma Mother     Heart Disease Father     Heart Disease Sister        Review of Systems   Constitutional: Negative. HENT: Negative. Eyes: Negative. Respiratory: Negative. Cardiovascular: Negative for chest pain and palpitations. Gastrointestinal: Negative. Genitourinary: Negative. Musculoskeletal: Negative. Skin: Negative. Neurological: Negative for focal weakness. Physical Exam   Constitutional: He is well-developed, well-nourished, and in no distress. No distress. HENT:   Head: Normocephalic and atraumatic. Cardiovascular: Normal rate, regular rhythm and normal heart sounds. Exam reveals no gallop and no friction rub. No murmur heard. Pulmonary/Chest: Effort normal and breath sounds normal. No respiratory distress. He has no wheezes. He has no rales. Skin: He is not diaphoretic. Vitals reviewed. Visit Vitals    /53    Pulse 94    Temp 96.4 °F (35.8 °C) (Oral)    Resp 17    Ht 5' 10\" (1.778 m)    Wt 157 lb 3.2 oz (71.3 kg)    BMI 22.56 kg/m2         ASSESSMENT:  1. Chronic atrial fibrillation ([address]    2. Iron deficiency anemia, unspecified iron deficiency anemia type    3. NSTEMI (non-ST elevated myocardial infarction) ([address]    4. Anemia in stage 3 chronic kidney disease        XR Results (maximum last 3): Results from [place] encounter on 11/15/17   XR KNEE LT 3 V   Narrative LEFT KNEE, 3 VIEWS    INDICATION:  Pain. COMPARISON:  None. FINDINGS:    There is no acute fracture or dislocation. Prepatellar soft tissue swelling  noted. There is a small suprapatellar effusion. Bone mineralization is normal.  The 3 knee compartments are preserved. There is considerable arterial  calcification noted. Impression IMPRESSION:    Prepatellar soft tissue swelling and small suprapatellar effusion.       Results from Abstract encounter on 08/21/17   XR FOOT LT MIN 3 V  Results from Abstract encounter on 09/08/16   XR CHEST PA LAT    CT Results (maximum last 3): No results found for this or any previous visit. MRI Results (maximum last 3): No results found for this or any previous visit. Nuclear Medicine Results (maximum last 3): No results found for this or any previous visit. US Results (maximum last 3): No results found for this or any previous visit. RAMIREZ Results (most recent):  No results found for this or any previous visit. VAS/US Results (maximum last 3): No results found for this or any previous visit. PET Results (maximum last 3): No results found for this or any previous visit. Results for orders placed or performed during the hospital encounter of 01/17/18   CBC WITH AUTOMATED DIFF   Result Value Ref Range    WBC 5.4 4.1 - 11.1 K/uL    RBC 3.49 (L) 4.10 - 5.70 M/uL    HGB 10.3 (L) 12.1 - 17.0 g/dL    HCT 35.0 (L) 36.6 - 50.3 %    .3 (H) 80.0 - 99.0 FL    MCH 29.5 26.0 - 34.0 PG    MCHC 29.4 (L) 30.0 - 36.5 g/dL    RDW 14.4 11.5 - 14.5 %    PLATELET 677 005 - 384 K/uL    NEUTROPHILS 48 32 - 75 %    LYMPHOCYTES 29 12 - 49 %    MONOCYTES 11 5 - 13 %    EOSINOPHILS 12 (H) 0 - 7 %    BASOPHILS 0 0 - 1 %    ABS. NEUTROPHILS 2.6 1.8 - 8.0 K/UL    ABS. LYMPHOCYTES 1.6 0.8 - 3.5 K/UL    ABS. MONOCYTES 0.6 0.0 - 1.0 K/UL    ABS. EOSINOPHILS 0.6 (H) 0.0 - 0.4 K/UL    ABS. BASOPHILS 0.0 0.0 - 0.1 K/UL     No orders of the defined types were placed in this encounter. Assessment:  1. Anemia of chronic renal disease and history of iron deficiency, improvement. Hgb 10.3 with doses of Procrit 20,000 units on December 19th and December 26th. Since that time he's remained over a Hgb of 10. Comment:  The question is where the improvement is from. Is it from iron and the Procrit or one or the other. Right now we are trying to figure outhis needs  and how often he'll require Procrit. We will have him come and get a CBC next week. I'll just check that, but see him in two weeks. Hopefully we will determine the safe interval for him coming back. Gratifying to see that he has had such an improvement. I pointed out to his daughter that if they notice any unusual rashes that can be a problem with Procrit. 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 There is a new warning out about that. I personally have not seen that. I think it is unusual.  But the condition is so serious that we do have to make a warning here and I told her to let us know immediately if a rash occus. Plan (Comment): Follow-up Disposition:  Return in about 2 weeks (around 1/31/2018), or cbc on 1/24/18, for Office visit.     Duy Doty MD

## 2018-01-17 NOTE — PROGRESS NOTES
Lucille Copeland is a 80 y.o. male denies complaints. L knee is getting better from Hx of fall.     Patient scheduled for Procrit injection in [facility] today

## 2018-01-31 ENCOUNTER — OFFICE VISIT (OUTPATIENT)
Dept: ONCOLOGY | Age: 83
End: 2018-01-31

## 2018-01-31 VITALS
DIASTOLIC BLOOD PRESSURE: 51 MMHG | HEART RATE: 79 BPM | BODY MASS INDEX: 22.36 KG/M2 | HEIGHT: 70 IN | OXYGEN SATURATION: 98 % | TEMPERATURE: 96.6 F | RESPIRATION RATE: 17 BRPM | SYSTOLIC BLOOD PRESSURE: 104 MMHG | WEIGHT: 156.2 LBS

## 2018-01-31 DIAGNOSIS — N18.30 ANEMIA IN STAGE 3 CHRONIC KIDNEY DISEASE (HCC): ICD-10-CM

## 2018-01-31 DIAGNOSIS — D63.1 ANEMIA IN STAGE 3 CHRONIC KIDNEY DISEASE (HCC): ICD-10-CM

## 2018-01-31 DIAGNOSIS — D50.9 IRON DEFICIENCY ANEMIA, UNSPECIFIED IRON DEFICIENCY ANEMIA TYPE: ICD-10-CM

## 2018-01-31 DIAGNOSIS — D69.6 THROMBOCYTOPENIA (HCC): Primary | ICD-10-CM

## 2018-01-31 RX ORDER — AMOXICILLIN AND CLAVULANATE POTASSIUM 875; 125 MG/1; MG/1
1 TABLET, FILM COATED ORAL 2 TIMES DAILY
COMMUNITY
End: 2018-11-01

## 2018-01-31 NOTE — PROGRESS NOTES
Ju King is a 80 y.o. male denies SOB, increase lethargy or pain. Currently on Augumentin for dentures rubbing gums, decrease risk for infection. Xarelto has increased to 15mg daily.

## 2018-01-31 NOTE — PROGRESS NOTES
Aline Smallwood is a 80 y.o. male who presents to the office today for the following:  Chief Complaint   Patient presents with    Anemia     thrombcytopenia       Referring Provider:  Landry Kramer NP    HPI   Subjective:  Mr. Lakesha Knight comes in today with a few problems and a new one. We've been following him for anemia and it's thought to be related somehow to some iron deficiency and anemia of chronic renal disease. We started him on iron and his hemoglobin went up a bit and eventually were giving him Procrit and his hemoglobin went over 10. We suspected he was going to be dependent on Procrit, but he's come in almost the whole month of January when we expected a dropoff in hemoglobin and it has not occurred. However, he has had a dropoff in platelet count to 396I, and this clearly is new. His platelet counts were running the lowest around 145k, but mainly in the 150-165k range, but today's is 113k. His hemoglobin is 10.5 and his white count 4,900 and absolute neutrophil count is relatively stable at 2,600. The other new issue is that he had developed some gingival irritation in his lower jaw. It appears to be either an incisor, the only one left in that area, to which he attaches his denture. Evidently this area became reddened or irritated and he was placed on Augmentin, 875 mg of Amoxicillin/125 mg of Clavulanate. This is a relatively high dose for a man of his age and size. He continues on ferrous fumerate 325 mg daily, which is 106 mg of elemental iron. He did see his dentist, who ground down and smoothed out his denture and it's gotten better and this doesn't bother him anymore. He also noted some right ear pain and discomfort in his throat, which may be referred ear pain from his hypopharynx. He said that is getting better. He notes no discharge from his ear. He does have wax problems with it, but that's not been an issue.           CURRENT TREATMENT: Oral iron    Past Medical History: Diagnosis Date    Anemia     Arrhythmia     Afib    Chronic kidney disease     Depression     Hypercholesterolemia     Hypertension     NSTEMI (non-ST elevated myocardial infarction) ([facility] [address] 11/21/2016    [facility]:  JACKELIN to SVG-L-PLB and JACKELIN to SVG-LAD    Pacemaker 2009    Followed By Dr Vaughn Waldrop S/P CABG x 4 1995    Thyroid disease     hypothyroidism     Past Surgical History:   Procedure Laterality Date    CARDIAC SURG PROCEDURE UNLIST      pacer    CARDIAC SURG PROCEDURE UNLIST  07/20/1995    quadruple bypass     HX CORONARY ARTERY BYPASS GRAFT  1995    4V    HX PACEMAKER  04/23/2003       No flowsheet data found.]          Key Oncology Meds     Patient is on no Oncologic meds. Allergies   Allergen Reactions    Hydrocodone Nausea Only       TRANSFUSIONS: None     Social History     Social History    Marital status:      Spouse name: N/A    Number of children: N/A    Years of education: N/A     Social History Main Topics    Smoking status: Former Smoker    Smokeless tobacco: Never Used    Alcohol use 4.2 oz/week     7 Glasses of wine per week    Drug use: No    Sexual activity: Not Asked     Other Topics Concern    None     Social History Narrative   . Family History   Problem Relation Age of Onset    Glaucoma Mother     Heart Disease Father     Heart Disease Sister        Review of Systems   Constitutional: Negative for chills, diaphoresis and fever. HENT: Positive for ear pain. Gingival irritation lower left area around jaw, right ear pain and hurts when he swallows , is better   Eyes: Negative. Respiratory: Negative. Cardiovascular: Negative. Gastrointestinal: Negative. Genitourinary: Negative. Musculoskeletal: Negative. Neurological: Negative. All other systems reviewed and are negative. Physical Exam   Constitutional: He is well-developed, well-nourished, and in no distress. No distress.    HENT:   Head: Normocephalic and atraumatic. Right Ear: Ear canal normal. Tympanic membrane is not injected and not perforated. Ears:    Nose: Nose normal.   Mouth/Throat: Oropharynx is clear and moist. No oropharyngeal exudate. Eyes: Right eye exhibits no discharge. Left eye exhibits no discharge. No scleral icterus. Neck: No thyromegaly present. Cardiovascular: Normal rate and regular rhythm. Exam reveals no gallop and no friction rub. No murmur heard. Pulmonary/Chest: No respiratory distress. He has no wheezes. He has no rales. Abdominal: He exhibits no distension. There is no splenomegaly. There is no tenderness. There is no rebound and no guarding. Lymphadenopathy:        Head (right side): No submental and no submandibular adenopathy present. Head (left side): No submental and no submandibular adenopathy present. He has no cervical adenopathy. Skin: He is not diaphoretic. Psychiatric: Mood and affect normal.     Visit Vitals    /51    Pulse 79    Temp 96.6 °F (35.9 °C) (Oral)    Resp 17    Ht 5' 10\" (1.778 m)    Wt 156 lb 3.2 oz (70.9 kg)    SpO2 98%    BMI 22.41 kg/m2         ASSESSMENT:  1. Thrombocytopenia ([address]    2. Iron deficiency anemia, unspecified iron deficiency anemia type    3. Anemia in stage 3 chronic kidney disease        XR Results (maximum last 3): Results from [place] encounter on 11/15/17   XR KNEE LT 3 V   Narrative LEFT KNEE, 3 VIEWS    INDICATION:  Pain. COMPARISON:  None. FINDINGS:    There is no acute fracture or dislocation. Prepatellar soft tissue swelling  noted. There is a small suprapatellar effusion. Bone mineralization is normal.  The 3 knee compartments are preserved. There is considerable arterial  calcification noted. Impression IMPRESSION:    Prepatellar soft tissue swelling and small suprapatellar effusion.       Results from [place] encounter on 08/21/17   XR FOOT LT MIN 3 V  Results from Abstract encounter on 09/08/16   XR CHEST PA LAT    CT Results (maximum last 3): No results found for this or any previous visit. MRI Results (maximum last 3): No results found for this or any previous visit. Nuclear Medicine Results (maximum last 3): No results found for this or any previous visit. US Results (maximum last 3): No results found for this or any previous visit. RAMIREZ Results (most recent):  No results found for this or any previous visit. VAS/US Results (maximum last 3): No results found for this or any previous visit. PET Results (maximum last 3): No results found for this or any previous visit. Results for orders placed or performed during the hospital encounter of 01/31/18   CBC WITH AUTOMATED DIFF   Result Value Ref Range    WBC 4.9 4.1 - 11.1 K/uL    RBC 3.38 (L) 4.10 - 5.70 M/uL    HGB 10.5 (L) 12.1 - 17.0 g/dL    HCT 33.2 (L) 36.6 - 50.3 %    MCV 98.2 80.0 - 99.0 FL    MCH 31.1 26.0 - 34.0 PG    MCHC 31.6 30.0 - 36.5 g/dL    RDW 13.2 11.5 - 14.5 %    PLATELET 548 (L) 588 - 400 K/uL    MPV 10.7 8.9 - 12.9 FL    NRBC 0.0 0  WBC    ABSOLUTE NRBC 0.00 0.00 - 0.01 K/uL    NEUTROPHILS 52 32 - 75 %    LYMPHOCYTES 24 12 - 49 %    MONOCYTES 10 5 - 13 %    EOSINOPHILS 14 (H) 0 - 7 %    BASOPHILS 0 0 - 1 %    IMMATURE GRANULOCYTES 0 0.0 - 0.5 %    ABS. NEUTROPHILS 2.6 1.8 - 8.0 K/UL    ABS. LYMPHOCYTES 1.2 0.8 - 3.5 K/UL    ABS. MONOCYTES 0.5 0.0 - 1.0 K/UL    ABS. EOSINOPHILS 0.7 (H) 0.0 - 0.4 K/UL    ABS. BASOPHILS 0.0 0.0 - 0.1 K/UL    ABS. IMM. GRANS. 0.0 0.00 - 0.04 K/UL    DF AUTOMATED       Orders Placed This Encounter    amoxicillin-clavulanate (AUGMENTIN) 875-125 mg per tablet     Sig: Take 1 Tab by mouth two (2) times a day. Indications: ? tooth infection dentured rubbed, take for 10 days started 1/25/18     Assessment:  1. Multifactorial anemia, probably related to chronic renal disease and iron deficiency, though stools I believe have been negative for occult blood.   2. Improvement on iron and Procrit, but he's not had a dose of Procrit since the end of December and we were just going to let him have about three weeks off. We notice he is thrombocytopenic, though moderately so and having no problems with bleeding. 3. Gingival irritation with improvement with work on his dentures. See above. Started on Augmentin 875/125 b.i.d., initiated 01/25/18 and was supposed to take it 10 days, and this would be day 7. Plan:  1. We have to see him for a blood test at least next week and I am suggesting strongly that this Augmentin be discontinued. It's the only medication new that he's gotten and his platelet counts have dropped for no reason. Hopefully next week he will not require Procrit, but if he does I think we can give him a dose and if his platelets are fine and he needs no further workup we can give him probably about three weeks off. Plan (Comment): Follow-up Disposition:  Return in about 7 days (around 2/7/2018) for Labs.     Libby Rivera MD

## 2018-01-31 NOTE — MR AVS SNAPSHOT
Nithya Casarez [address] [id number] [phone] Patient: Juanita Jessica MRN: [id number] BRITTNEY:[id number] Visit Information Date & Time Provider Department Dept. Phone Encounter #  
 1/31/2018  9:30 AM Ronny Hollingsworth MD [facility]  Follow-up Instructions Return in about 7 days (around 2/7/2018) for Labs. Upcoming Health Maintenance Date Due Pneumococcal 65+ Low/Medium Risk (2 of 2 - PPSV23) 9/11/2016 MEDICARE YEARLY EXAM 3/18/2017 GLAUCOMA SCREENING Q2Y 8/20/2019 DTaP/Tdap/Td series (2 - Td) 8/20/2027 Allergies as of 1/31/2018  Review Complete On: 1/31/2018 By: Ronny Hollingsworth MD  
  
 Severity Noted Reaction Type Reactions Hydrocodone  05/06/2015    Nausea Only Current Immunizations  Reviewed on 1/31/2018 Name Date Influenza High Dose Vaccine PF 11/6/2017 Influenza Vaccine 10/15/2015 Pneumococcal Conjugate (PCV-13) 9/11/2015 Reviewed by Sofia Cavazos, RN on 1/31/2018 at  9:30 AM  
 Reviewed by Damian Ambrose, RN on 1/31/2018 at  9:35 AM  
You Were Diagnosed With   
  
 Codes Comments [facility]openia [facility]    -  Primary ICD-10-CM: D69.6 ICD-9-CM: 287.5 Iron deficiency anemia, unspecified iron deficiency anemia type     ICD-10-CM: D50.9 ICD-9-CM: 280.9 Anemia in stage 3 chronic kidney disease     ICD-10-CM: N18.3, D63.1 ICD-9-CM: 285.21, 585.3 Vitals BP Pulse Temp Resp Height(growth percentile) Weight(growth percentile) 104/51 79 96.6 °F (35.9 °C) (Oral) 17 5' 10\" (1.778 m) 156 lb 3.2 oz (70.9 kg) SpO2 BMI Smoking Status 98% 22.41 kg/m2 Former Smoker BMI and BSA Data Body Mass Index Body Surface Area  
 22.41 kg/m 2 1.87 m 2 Preferred Pharmacy Pharmacy Name Phone [address] [facility]. [phone] Your Updated Medication List  
  
   
This list is accurate as of: 1/31/18 10:47 AM.  Always use your most recent med list.  
  
  
  
  
 albuterol 2.5 mg /3 mL (0.083 %) nebulizer solution Commonly known as:  PROVENTIL VENTOLIN  
2.5 mg by Nebulization route as needed for Wheezing. Every 6 hrs prn SOB. atorvastatin 80 mg tablet Commonly known as:  LIPITOR Take 1 Tab by mouth daily. AUGMENTIN 875-125 mg per tablet Generic drug:  amoxicillin-clavulanate Take 1 Tab by mouth two (2) times a day. Indications: ? tooth infection dentured rubbed, take for 10 days started 1/25/18  
  
 calcium polycarbophil 625 mg tablet Commonly known as:  FIBER-TABS Take 1 Tab by mouth two (2) times a day. Indications: DIARRHEA  
  
 cyanocobalamin 1,000 mcg/mL injection Commonly known as:  VITAMIN B12  
1,000 mcg by IntraMUSCular route every month. Every 3 weeks DULCOLAX (BISACODYL) 10 mg suppository Generic drug:  bisacodyl Insert 10 mg into rectum as needed. escitalopram oxalate 5 mg tablet Commonly known as:  Prema Pattee Take 1 Tab by mouth daily. famotidine 20 mg tablet Commonly known as:  PEPCID Take 1 Tab by mouth two (2) times a day. Ferrous Fumarate 325 mg (106 mg iron) Tab Take  by mouth daily. finasteride 5 mg tablet Commonly known as:  PROSCAR Take 1 Tab by mouth daily. Indications: SYMPTOMATIC BENIGN PROSTATIC HYPERPLASIA  
  
 hydrocortisone 2.5 % rectal cream  
Commonly known as:  ANUSOL-HC Insert  into rectum as needed for Hemorrhoids. loratadine 10 mg tablet Commonly known as:  Filippo Lieu Take 1 Tab by mouth daily. Indications: ALLERGIC RHINITIS MAALOX ADVANCED 200-200-20 mg/5 mL Susp Generic drug:  alum-mag hydroxide-simeth Take 30 mL by mouth every four (4) hours as needed. mineral oil enema Commonly known as:  FLEET Insert  into rectum as needed for Constipation. multivitamin tablet Commonly known as:  ONE A DAY Take 1 Tab by mouth daily. nitroglycerin 0.4 mg SL tablet Commonly known as:  NITROSTAT  
by SubLINGual route every five (5) minutes as needed for Chest Pain (not to exceed 3 doses). * OTHER Spiritus Anant ([facility]) May keep wine in room. * OTHER Natural Laxative 30cc po every day CLEMENS MILK OF MAGNESIA 400 mg/5 mL suspension Generic drug:  magnesium hydroxide Take 30 mL by mouth daily as needed for Constipation. Protein Supplement Liqd Take 1 CUP by mouth two (2) times a day. * XARELTO 15 mg Tab tablet Generic drug:  rivaroxaban Take  by mouth daily. * rivaroxaban 10 mg tablet Commonly known as:  Hickey Gens Take 1 Tab by mouth daily. Indications: PREVENT THROMBOEMBOLISM IN CHRONIC ATRIAL FIBRILLATION  
  
 sodium chloride 0.65 % nasal spray Commonly known as:  OCEAN  
2 Sprays by Both Nostrils route four (4) times daily. SUPPOSITORY ADULT suppository Generic drug:  glycerin (adult) Insert 1 Suppository into rectum as needed. SYNTHROID 75 mcg tablet Generic drug:  levothyroxine Take  by mouth Daily (before breakfast). traMADol 50 mg tablet Commonly known as:  ULTRAM  
Take 50 mg by mouth every eight (8) hours as needed for Pain. TRIPLE ANTIBIOTIC 3.5mg-400 unit- 5,000 unit/gram ointment Generic drug:  neomycin-bacitracin-polymyxin Apply  to affected area as needed. * TYLENOL 325 mg tablet Generic drug:  acetaminophen Take 325 mg by mouth every four (4) hours as needed for Pain. Take two tabs po bid. * acetaminophen 80 mg suppository Commonly known as:  TYLENOL Insert 650 mg into rectum every four (4) hours as needed for Fever. * Notice: This list has 6 medication(s) that are the same as other medications prescribed for you. Read the directions carefully, and ask your doctor or other care provider to review them with you. Follow-up Instructions Return in about 7 days (around 2/7/2018) for Labs. Introducing hospitals & HEALTH SERVICES! [employer] Rom[employer] introduces [facility] [address] patient portal. Now you can access parts of your medical record, email your doctor's office, and request medication refills online. 1. In your internet browser, go to [email or web address] 2. Click on the First Time User? Click Here link in the Sign In box. You will see the New Member Sign Up page. 3. Enter your [facility] [address] Access Code exactly as it appears below. You will not need to use this code after youve completed the sign-up process. If you do not sign up before the expiration date, you must request a new code. · [facility] [address] Access Code: [email or web address] Expires: 2/13/2018  2:55 PM 
 
4. Enter the last four digits of your Social Security Number (xxxx) and Date of Birth (mm/dd/yyyy) as indicated and click Submit. You will be taken to the next sign-up page. 5. Create a [facility] [address] ID. This will be your [facility] [address] login ID and cannot be changed, so think of one that is secure and easy to remember. 6. Create a [address] password. You can change your password at any time. 7. Enter your Password Reset Question and Answer. This can be used at a later time if you forget your password. 8. Enter your e-mail address. You will receive e-mail notification when new information is available in [address] 9. Click Sign Up. You can now view and download portions of your medical record. 10. Click the Download Summary menu link to download a portable copy of your medical information. If you have questions, please visit the Frequently Asked Questions section of the [facility] website. Remember, [facility] [address] is NOT to be used for urgent needs. For medical emergencies, dial 911. Now available from your iPhone and Android! Please provide this summary of care documentation to your next provider. Your primary care clinician is listed as Lyndon Sánchez.  If you have any questions after today's visit, please call [phone].

## 2018-02-26 ENCOUNTER — OFFICE VISIT (OUTPATIENT)
Dept: ONCOLOGY | Age: 83
End: 2018-02-26

## 2018-02-26 VITALS
OXYGEN SATURATION: 93 % | SYSTOLIC BLOOD PRESSURE: 124 MMHG | RESPIRATION RATE: 16 BRPM | DIASTOLIC BLOOD PRESSURE: 60 MMHG | TEMPERATURE: 97.8 F | WEIGHT: 156.2 LBS | HEIGHT: 70 IN | HEART RATE: 86 BPM | BODY MASS INDEX: 22.36 KG/M2

## 2018-02-26 DIAGNOSIS — D63.1 ANEMIA IN STAGE 3 CHRONIC KIDNEY DISEASE (HCC): ICD-10-CM

## 2018-02-26 DIAGNOSIS — I21.4 NSTEMI (NON-ST ELEVATED MYOCARDIAL INFARCTION) (HCC): Primary | ICD-10-CM

## 2018-02-26 DIAGNOSIS — N18.30 ANEMIA IN STAGE 3 CHRONIC KIDNEY DISEASE (HCC): ICD-10-CM

## 2018-02-26 DIAGNOSIS — D50.9 IRON DEFICIENCY ANEMIA, UNSPECIFIED IRON DEFICIENCY ANEMIA TYPE: ICD-10-CM

## 2018-02-26 NOTE — PROGRESS NOTES
Milana Garcia is a 80 y.o. male who presents to the office today for the following:  Chief Complaint   Patient presents with   [facility] Anemia       Referring Provider:  Allie Crow NP    HPI  Subjective:  Mr. Missy Villagran is a 80year old man, who we have been following for multifactorial anemia, probably related to chronic renal disease and iron deficiency, but stools have been negative for occult blood at the nursing home. I'm not sure how many actually were done too. The patient improved on iron Procrit. It appears that iron seems to have been more important. He did have transfusions in the past and he has not had Procrit since the end of December of 2017. The patient has developed problem with thrombocytopenia, but no bleeding. This was after he started Augmentin. On 01/17/18 his platelet count was 923X and similarly about a week later 149k. There was a dramatic drop to 113k on the 31st of January and then a slight rise to 116k by the 7th. Today on the 26th of February platelet counts have gone up to 121k. His HGB seems to be better than ever with a value of 10.8. His MCV is 98.2 and his RDW is normal.    He feels well and is having no problems. There has been perhaps some confusion on the type of iron I suggested he start. We have written down that I wanted him to be on ferrous fumerate 324 mg b.i.d. As far as we can tell from the nursing home, he's on ferrous sulfate. There was some question of constipation, but the patient denies that so it's not clear. The reason I ordered ferrous fumerate over ferrous sulfate is that it is a much better medication, delivers more iron per tablet with less side effects of GI toxicity, including gastric upset and at times severe constipation. See comments below.           CURRENT TREATMENT: Iron replacement, Procrit as needed, monitor thrombocytopenia    Past Medical History:   Diagnosis Date    Anemia     Arrhythmia     Afib    Chronic kidney disease  Depression     Hypercholesterolemia     Hypertension     NSTEMI (non-ST elevated myocardial infarction) ([facility] [address] 11/21/2016    [facility]:  JACKELIN to SVG-L-PLB and JACKELIN to SVG-LAD    Pacemaker 2009    Followed By Dr Checo Lancaster S/P CABG x 4 1995    Thyroid disease     hypothyroidism     Past Surgical History:   Procedure Laterality Date    CARDIAC SURG PROCEDURE UNLIST      pacer    CARDIAC SURG PROCEDURE UNLIST  07/20/1995    quadruple bypass     HX CORONARY ARTERY BYPASS GRAFT  1995    4V    HX PACEMAKER  04/23/2003       No flowsheet data found.]          Key Oncology Meds     Patient is on no Oncologic meds. Allergies   Allergen Reactions    Hydrocodone Nausea Only       TRANSFUSIONS: None     Social History     Social History    Marital status:      Spouse name: N/A    Number of children: N/A    Years of education: N/A     Social History Main Topics    Smoking status: Former Smoker    Smokeless tobacco: Never Used    Alcohol use 4.2 oz/week     7 Glasses of wine per week    Drug use: No    Sexual activity: Not Asked     Other Topics Concern    None     Social History Narrative   . Family History   Problem Relation Age of Onset    Glaucoma Mother     Heart Disease Father     Heart Disease Sister        Review of Systems   Constitutional: Negative for chills and diaphoresis. Respiratory: Positive for sputum production. Negative for cough, hemoptysis, shortness of breath and wheezing. Cardiovascular: Negative for chest pain and palpitations. Gastrointestinal: Negative. Genitourinary: Positive for frequency. Negative for dysuria and hematuria. Musculoskeletal: Negative. Negative for falls. Skin: Negative. Neurological: Negative for headaches. Physical Exam   Constitutional: He is well-developed, well-nourished, and in no distress. No distress. HENT:   Head: Normocephalic and atraumatic.    Mouth/Throat: Oropharynx is clear and moist. No oropharyngeal exudate. Eyes: Conjunctivae and EOM are normal. Right eye exhibits no discharge. Left eye exhibits no discharge. No scleral icterus. Neck: No tracheal deviation present. No thyromegaly present. Cardiovascular: Normal rate, regular rhythm and normal heart sounds. Exam reveals no gallop and no friction rub. No murmur heard. Pulmonary/Chest: Effort normal and breath sounds normal. No respiratory distress. He has no wheezes. He has no rales. Abdominal: Soft. He exhibits no distension. There is no hepatosplenomegaly. There is no tenderness. There is no rebound and no guarding. Musculoskeletal: He exhibits no edema, tenderness or deformity. Lymphadenopathy:     He has no cervical adenopathy. He has no axillary adenopathy. Neurological: He is alert. Skin: No rash noted. He is not diaphoretic. No erythema. Psychiatric: Mood and affect normal.     Visit Vitals    /60    Pulse 86    Temp 97.8 °F (36.6 °C) (Oral)    Resp 16    Ht 5' 10\" (1.778 m)    Wt 156 lb 3.2 oz (70.9 kg)    SpO2 93%    BMI 22.41 kg/m2         ASSESSMENT:  1. NSTEMI (non-ST elevated myocardial infarction) ([facility] [address]    2. Anemia in stage 3 chronic kidney disease    3. Iron deficiency anemia, unspecified iron deficiency anemia type        XR Results (maximum last 3): Results from [place] encounter on 11/15/17   XR KNEE LT 3 V   Narrative LEFT KNEE, 3 VIEWS    INDICATION:  Pain. COMPARISON:  None. FINDINGS:    There is no acute fracture or dislocation. Prepatellar soft tissue swelling  noted. There is a small suprapatellar effusion. Bone mineralization is normal.  The 3 knee compartments are preserved. There is considerable arterial  calcification noted. Impression IMPRESSION:    Prepatellar soft tissue swelling and small suprapatellar effusion.       Results from Abstract encounter on 08/21/17   XR FOOT LT MIN 3 V  Results from Abstract encounter on 09/08/16   XR CHEST PA LAT    CT Results (maximum last 3): No results found for this or any previous visit. MRI Results (maximum last 3): No results found for this or any previous visit. Nuclear Medicine Results (maximum last 3): No results found for this or any previous visit. US Results (maximum last 3): No results found for this or any previous visit. RAMIREZ Results (most recent):  No results found for this or any previous visit. VAS/US Results (maximum last 3): No results found for this or any previous visit. PET Results (maximum last 3): No results found for this or any previous visit. Results for orders placed or performed during the hospital encounter of 02/26/18   CBC WITH AUTOMATED DIFF   Result Value Ref Range    WBC 5.6 4.1 - 11.1 K/uL    RBC 3.42 (L) 4.10 - 5.70 M/uL    HGB 10.8 (L) 12.1 - 17.0 g/dL    HCT 33.6 (L) 36.6 - 50.3 %    MCV 98.2 80.0 - 99.0 FL    MCH 31.6 26.0 - 34.0 PG    MCHC 32.1 30.0 - 36.5 g/dL    RDW 13.1 11.5 - 14.5 %    PLATELET 986 (L) 277 - 400 K/uL    NRBC 0.0 0  WBC    ABSOLUTE NRBC 0.00 0.00 - 0.01 K/uL    NEUTROPHILS 49 32 - 75 %    LYMPHOCYTES 25 12 - 49 %    MONOCYTES 10 5 - 13 %    EOSINOPHILS 15 (H) 0 - 7 %    BASOPHILS 0 0 - 1 %    IMMATURE GRANULOCYTES 0 0.0 - 0.5 %    ABS. NEUTROPHILS 2.8 1.8 - 8.0 K/UL    ABS. LYMPHOCYTES 1.4 0.8 - 3.5 K/UL    ABS. MONOCYTES 0.6 0.0 - 1.0 K/UL    ABS. EOSINOPHILS 0.8 (H) 0.0 - 0.4 K/UL    ABS. BASOPHILS 0.0 0.0 - 0.1 K/UL    ABS. IMM. GRANS. 0.0 0.00 - 0.04 K/UL    DF AUTOMATED     RETICULOCYTE COUNT   Result Value Ref Range    Reticulocyte count 1.2 0.7 - 2.1 %    Absolute Retic Cnt. 0.0393 0.0260 - 0.0950 M/ul     No orders of the defined types were placed in this encounter. Assessment:  1. Multifactorial anemia, secondary to chronic renal disease and iron deficiency. Causation of iron deficiency is not clear. Stool is negative for blood at the nursing home. 2. Improvement in iron and Procrit, mainly on iron.   Patient not requiring Procrit since end of December, 2017. 3. Thrombocytopenia, question of etiology, occurring after the use of Augmentin. Patient is now off that and there has been a slight improvement. See note above. Plan (Comment):  Plan:  4. Patient is going to continue on iron at this time. I'd like to get stools for occult blood again and I asked to get them x three at the nursing home. 5. In regard to his iron, I've made clear to the nursing home that if he's having constipation or problems he really should be on ferrous fumerate. If he's having no problems I have no problem with him continuing on the ferrous sulfate. 6. When he returns I want him to be off iron the previous day to our visit and then have his blood work done, a CBC, chemistries and iron studies. 7. If his iron increases back to normal, I think I would stop the iron and monitor. It is always possible we are missing something and covering it up with iron therapy and it would be important to know. We will try to avoid endoscopic studies, but though he's older the's still fairly vigorous and decisions would be made at that time, but hopefully that would not be necessary. 8. In regard to his platelet count, I would consider obtaining a bone marrow if there is any question. His HGB, however, at the present time is fairly decent with a normal MCV. There is no macrocytosis noted and his ANC is quite decent at 2,800, but myelodysplasia must be thought of in a patient of his age if he has further problems with cytopenias. Otherwise I think we hopefully won't have to do that. He's going to follow up with Dr. Mehnaz Taylor in the future. Follow-up Disposition:  Return in about 6 weeks (around 4/9/2018) for Office visit, Labs.     Rema Mosqueda MD

## 2018-02-26 NOTE — PROGRESS NOTES
Kathrene Crigler is a 80 y.o. male no increase of SOB, remains stable. Denies additional complaints. Patient scheduled for procrit today.

## 2018-02-26 NOTE — MR AVS SNAPSHOT
[address] [facility] [address] [id number] [phone] Patient: Lucille Copeland MRN: [id number] Visit Information Date & Time Provider Department Dept. Phone Encounter #  
 2/26/2018  2:00 PM Hardy Lombardo MD [facility] [phone] [id number] Follow-up Instructions Return in about 6 weeks (around 4/9/2018) for Office visit, Labs. Your Appointments 4/9/2018  1:30 PM  
Follow Up with Osmany Rangel MD  
[facility] Appt Note: 6wk F/U  
 [address] [id number] [phone]  
  
   
 [address] [facility] [address] [facility] Upcoming Health Maintenance Date Due Pneumococcal 65+ Low/Medium Risk (2 of 2 - PPSV23) 9/11/2016 MEDICARE YEARLY EXAM 3/18/2017 GLAUCOMA SCREENING Q2Y 8/20/2019 DTaP/Tdap/Td series (2 - Td) 8/20/2027 Allergies as of 2/26/2018  Review Complete On: 2/26/2018 By: Hardy Lombardo MD  
  
 Severity Noted Reaction Type Reactions Hydrocodone  05/06/2015    Nausea Only Current Immunizations  Reviewed on 2/26/2018 Name Date Influenza High Dose Vaccine PF 11/6/2017 Influenza Vaccine 10/15/2015 Pneumococcal Conjugate (PCV-13) 9/11/2015 Reviewed by Howie Morgan, RN on 2/26/2018 at  2:22 PM  
 Reviewed by Heidi Paez, RN on 2/26/2018 at  3:00 PM  
You Were Diagnosed With   
  
 Codes Comments NSTEMI (non-ST elevated myocardial infarction) ([facility] [address]    -  Primary ICD-10-CM: I21.4 ICD-9-CM: 410.70 Anemia in stage 3 chronic kidney disease     ICD-10-CM: N18.3, D63.1 ICD-9-CM: 285.21, 585.3 Iron deficiency anemia, unspecified iron deficiency anemia type     ICD-10-CM: D50.9 ICD-9-CM: 280.9 Vitals BP Pulse Temp Resp Height(growth percentile) Weight(growth percentile) 124/60 86 97.8 °F (36.6 °C) (Oral) 16 5' 10\" (1.778 m) 156 lb 3.2 oz (70.9 kg) SpO2 BMI Smoking Status 93% 22.41 kg/m2 Former Smoker Vitals History BMI and BSA Data Body Mass Index Body Surface Area  
 22.41 kg/m 2 1.87 m 2 Preferred Pharmacy Pharmacy Name Phone [address] [facility]. [phone] Your Updated Medication List  
  
   
This list is accurate as of 2/26/18  3:50 PM.  Always use your most recent med list.  
  
  
  
  
 albuterol 2.5 mg /3 mL (0.083 %) nebulizer solution Commonly known as:  PROVENTIL VENTOLIN  
2.5 mg by Nebulization route as needed for Wheezing. Every 6 hrs prn SOB. atorvastatin 80 mg tablet Commonly known as:  LIPITOR Take 1 Tab by mouth daily. AUGMENTIN 875-125 mg per tablet Generic drug:  amoxicillin-clavulanate Take 1 Tab by mouth two (2) times a day. Indications: ? tooth infection dentured rubbed, take for 10 days started 1/25/18  
  
 calcium polycarbophil 625 mg tablet Commonly known as:  FIBER-TABS Take 1 Tab by mouth two (2) times a day. Indications: DIARRHEA  
  
 cyanocobalamin 1,000 mcg/mL injection Commonly known as:  VITAMIN B12  
1,000 mcg by IntraMUSCular route every month. Every 3 weeks DULCOLAX (BISACODYL) 10 mg suppository Generic drug:  bisacodyl Insert 10 mg into rectum as needed. escitalopram oxalate 5 mg tablet Commonly known as:  Seretha Cheryl Take 1 Tab by mouth daily. famotidine 20 mg tablet Commonly known as:  PEPCID Take 1 Tab by mouth two (2) times a day. Ferrous Fumarate 325 mg (106 mg iron) Tab Take  by mouth daily. finasteride 5 mg tablet Commonly known as:  PROSCAR Take 1 Tab by mouth daily. Indications: SYMPTOMATIC BENIGN PROSTATIC HYPERPLASIA  
  
 hydrocortisone 2.5 % rectal cream  
Commonly known as:  ANUSOL-HC Insert  into rectum as needed for Hemorrhoids. loratadine 10 mg tablet Commonly known as:  Linden Media Take 1 Tab by mouth daily. Indications: ALLERGIC RHINITIS MAALOX ADVANCED 200-200-20 mg/5 mL Susp Generic drug:  alum-mag hydroxide-simeth Take 30 mL by mouth every four (4) hours as needed. mineral oil enema Commonly known as:  FLEET Insert  into rectum as needed for Constipation (daily as needed). multivitamin tablet Commonly known as:  ONE A DAY Take 1 Tab by mouth daily. nitroglycerin 0.4 mg SL tablet Commonly known as:  NITROSTAT  
by SubLINGual route every five (5) minutes as needed for Chest Pain (not to exceed 3 doses). * OTHER Spiritus Fermenti ([address]) May keep wine in room. * OTHER Natural Laxative 30cc po every day CLEMENS MILK OF MAGNESIA 400 mg/5 mL suspension Generic drug:  magnesium hydroxide Take 30 mL by mouth daily as needed for Constipation. Protein Supplement Liqd Take 1 CUP by mouth two (2) times a day. * XARELTO 15 mg Tab tablet Generic drug:  rivaroxaban Take  by mouth daily. * rivaroxaban 10 mg tablet Commonly known as:  Álvarez Cutting Take 1 Tab by mouth daily. Indications: PREVENT THROMBOEMBOLISM IN CHRONIC ATRIAL FIBRILLATION  
  
 sodium chloride 0.65 % nasal squeeze bottle Commonly known as:  OCEAN  
2 Sprays by Both Nostrils route four (4) times daily. SUPPOSITORY ADULT suppository Generic drug:  glycerin (adult) Insert 1 Suppository into rectum as needed. SYNTHROID 75 mcg tablet Generic drug:  levothyroxine Take  by mouth Daily (before breakfast). traMADol 50 mg tablet Commonly known as:  ULTRAM  
Take 50 mg by mouth every eight (8) hours as needed for Pain. TRIPLE ANTIBIOTIC 3.5mg-400 unit- 5,000 unit/gram ointment Generic drug:  neomycin-bacitracin-polymyxin Apply  to affected area as needed. * TYLENOL 325 mg tablet Generic drug:  acetaminophen Take 650 mg by mouth every four (4) hours as needed for Pain.  Take two tabs po bid. * acetaminophen 80 mg suppository Commonly known as:  TYLENOL Insert 650 mg into rectum every four (4) hours as needed for Fever. * Notice: This list has 6 medication(s) that are the same as other medications prescribed for you. Read the directions carefully, and ask your doctor or other care provider to review them with you. Follow-up Instructions Return in about 6 weeks (around 4/9/2018) for Office visit, Labs. Introducing [facility]! [employer] introduces [email or web address] patient portal. Now you can access parts of your medical record, email your doctor's office, and request medication refills online. 1. In your internet browser, go to [email or web address] 2. Click on the First Time User? Click Here link in the Sign In box. You will see the New Member Sign Up page. 3. Enter your [email or web address] Access Code exactly as it appears below. You will not need to use this code after youve completed the sign-up process. If you do not sign up before the expiration date, you must request a new code. · [email or web address] Access Code: [email or web address] Expires: 5/27/2018  3:50 PM 
 
4. Enter the last four digits of your Social Security Number (xxxx) and Date of Birth (mm/dd/yyyy) as indicated and click Submit. You will be taken to the next sign-up page. 5. Create a [email or web address] ID. This will be your [email or web address] login ID and cannot be changed, so think of one that is secure and easy to remember. 6. Create a [email or web address] password. You can change your password at any time. 7. Enter your Password Reset Question and Answer. This can be used at a later time if you forget your password. 8. Enter your e-mail address. You will receive e-mail notification when new information is available in [address] 9. Click Sign Up. You can now view and download portions of your medical record. 10. Click the Download Summary menu link to download a portable copy of your medical information. If you have questions, please visit the Frequently Asked Questions section of the [employer] website. Remember, [employer] is NOT to be used for urgent needs. For medical emergencies, dial 911. Now available from your iPhone and Android! Please provide this summary of care documentation to your next provider. Your primary care clinician is listed as Demarcus Palomares. If you have any questions after today's visit, please call [phone].

## 2018-04-06 ENCOUNTER — TELEPHONE (OUTPATIENT)
Dept: ONCOLOGY | Age: 83
End: 2018-04-06

## 2018-04-06 NOTE — TELEPHONE ENCOUNTER
Called daughter's phone, went to . No message left. Called assisted living at [facility] spoke to [place] relayed per Doctors notes, patient to hold iron day before and day of blood draw. She verbalized understanding and will relay message to staff.

## 2018-04-09 ENCOUNTER — OFFICE VISIT (OUTPATIENT)
Dept: ONCOLOGY | Age: 83
End: 2018-04-09

## 2018-04-09 VITALS
HEIGHT: 70 IN | RESPIRATION RATE: 19 BRPM | OXYGEN SATURATION: 94 % | DIASTOLIC BLOOD PRESSURE: 60 MMHG | TEMPERATURE: 96 F | BODY MASS INDEX: 22.16 KG/M2 | HEART RATE: 86 BPM | SYSTOLIC BLOOD PRESSURE: 112 MMHG | WEIGHT: 154.8 LBS

## 2018-04-09 DIAGNOSIS — D63.1 ANEMIA IN STAGE 3 CHRONIC KIDNEY DISEASE (HCC): ICD-10-CM

## 2018-04-09 DIAGNOSIS — D69.6 THROMBOCYTOPENIA (HCC): ICD-10-CM

## 2018-04-09 DIAGNOSIS — D50.9 IRON DEFICIENCY ANEMIA, UNSPECIFIED IRON DEFICIENCY ANEMIA TYPE: Primary | ICD-10-CM

## 2018-04-09 DIAGNOSIS — N18.30 ANEMIA IN STAGE 3 CHRONIC KIDNEY DISEASE (HCC): ICD-10-CM

## 2018-04-09 NOTE — PROGRESS NOTES
Zhao Live is a 80 y.o. male denies increase in tiredness, or weakness. Not sleeping well due to wife passing on Easter. Denies pain. Dark stools, patient on IRon. Patient held iron on 4/8f and 4/9 for blood work.

## 2018-04-09 NOTE — MR AVS SNAPSHOT
[address] [id number] [phone] Patient: Shelia Mtz MRN: [id number] Visit Information Date & Time Provider Department Dept. Phone Encounter #  
 4/9/2018  1:30 PM Lluvia Rocha MD [facility] [id number] Follow-up Instructions Return in about 3 months (around 7/9/2018). Your Appointments 7/9/2018  1:00 PM  
Follow Up with Lluvia Rocha MD  
[facility]: 3[facility] F/U  
 [address] [id number] [phone]  
  
   
 [address] [facility] [address] [place] [address] Upcoming Health Maintenance Date Due Pneumococcal 65+ Low/Medium Risk (2 of 2 - PPSV23) 9/11/2016 MEDICARE YEARLY EXAM 3/14/2018 GLAUCOMA SCREENING Q2Y 8/20/2019 DTaP/Tdap/Td series (2 - Td) 8/20/2027 Allergies as of 4/9/2018  Review Complete On: 4/9/2018 By: Anisa Roberts RN Severity Noted Reaction Type Reactions Hydrocodone  05/06/2015    Nausea Only Current Immunizations  Reviewed on 4/9/2018 Name Date Influenza High Dose Vaccine PF 11/6/2017 Influenza Vaccine 10/15/2015 Pneumococcal Conjugate (PCV-13) 9/11/2015 Reviewed by Anisa Roberts RN on 4/9/2018 at  1:47 PM  
You Were Diagnosed With   
  
 Codes Comments Iron deficiency anemia, unspecified iron deficiency anemia type    -  Primary ICD-10-CM: D50.9 ICD-9-CM: 280.9 Anemia in stage 3 chronic kidney disease     ICD-10-CM: N18.3, D63.1 ICD-9-CM: 285.21, 585.3 Thrombocytopenia ([address]     ICD-10-CM: D69.6 ICD-9-CM: 287.5 Vitals BP Pulse Temp Resp Height(growth percentile) Weight(growth percentile) 112/60 86 96 °F (35.6 °C) (Oral) 19 5' 10\" (1.778 m) 154 lb 12.8 oz (70.2 kg) SpO2 BMI Smoking Status 94% 22.21 kg/m2 Former Smoker BMI and BSA Data Body Mass Index Body Surface Area  
 22.21 kg/m 2 1.86 m 2 Preferred Pharmacy Pharmacy Name Phone [address] [facility]. [phone] Your Updated Medication List  
  
   
This list is accurate as of 4/9/18  2:18 PM.  Always use your most recent med list.  
  
  
  
  
 albuterol 2.5 mg /3 mL (0.083 %) nebulizer solution Commonly known as:  PROVENTIL VENTOLIN  
2.5 mg by Nebulization route as needed for Wheezing. Every 6 hrs prn SOB. atorvastatin 80 mg tablet Commonly known as:  LIPITOR Take 1 Tab by mouth daily. AUGMENTIN 875-125 mg per tablet Generic drug:  amoxicillin-clavulanate Take 1 Tab by mouth two (2) times a day. Indications: ? tooth infection dentured rubbed, take for 10 days started 1/25/18  
  
 calcium polycarbophil 625 mg tablet Commonly known as:  FIBER-TABS Take 1 Tab by mouth two (2) times a day. Indications: DIARRHEA  
  
 cyanocobalamin 1,000 mcg/mL injection Commonly known as:  VITAMIN B12  
1,000 mcg by IntraMUSCular route every month. Every 3 weeks DULCOLAX (BISACODYL) 10 mg suppository Generic drug:  bisacodyl Insert 10 mg into rectum as needed. escitalopram oxalate 5 mg tablet Commonly known as:  Levorn Allegra Take 1 Tab by mouth daily. famotidine 20 mg tablet Commonly known as:  PEPCID Take 1 Tab by mouth two (2) times a day. Ferrous Fumarate 325 mg (106 mg iron) Tab Take  by mouth daily. finasteride 5 mg tablet Commonly known as:  PROSCAR Take 1 Tab by mouth daily. Indications: SYMPTOMATIC BENIGN PROSTATIC HYPERPLASIA  
  
 hydrocortisone 2.5 % rectal cream  
Commonly known as:  ANUSOL-HC Insert  into rectum as needed for Hemorrhoids. loratadine 10 mg tablet Commonly known as:  Andreea Nett Take 1 Tab by mouth daily. Indications: ALLERGIC RHINITIS MAALOX ADVANCED 200-200-20 mg/5 mL Susp Generic drug:  alum-mag hydroxide-simeth Take 30 mL by mouth every four (4) hours as needed. mineral oil enema Commonly known as:  FLEET Insert  into rectum as needed for Constipation (daily as needed). multivitamin tablet Commonly known as:  ONE A DAY Take 1 Tab by mouth daily. nitroglycerin 0.4 mg SL tablet Commonly known as:  NITROSTAT  
by SubLINGual route every five (5) minutes as needed for Chest Pain (not to exceed 3 doses). * OTHER [employer] ([address] [facility]) May keep wine in room. * OTHER Natural Laxative 30cc po every day CLEMENS MILK OF MAGNESIA 400 mg/5 mL suspension Generic drug:  magnesium hydroxide Take 30 mL by mouth daily as needed for Constipation. Protein Supplement Liqd Take 1 CUP by mouth two (2) times a day. * XARELTO 15 mg Tab tablet Generic drug:  rivaroxaban Take  by mouth daily. * rivaroxaban 10 mg tablet Commonly known as:  Won Roldan Take 1 Tab by mouth daily. Indications: PREVENT THROMBOEMBOLISM IN CHRONIC ATRIAL FIBRILLATION  
  
 sodium chloride 0.65 % nasal squeeze bottle Commonly known as:  OCEAN  
2 Sprays by Both Nostrils route four (4) times daily. SUPPOSITORY ADULT suppository Generic drug:  glycerin (adult) Insert 1 Suppository into rectum as needed. SYNTHROID 75 mcg tablet Generic drug:  levothyroxine Take  by mouth Daily (before breakfast). traMADol 50 mg tablet Commonly known as:  ULTRAM  
Take 50 mg by mouth every eight (8) hours as needed for Pain. TRIPLE ANTIBIOTIC 3.5mg-400 unit- 5,000 unit/gram ointment Generic drug:  neomycin-bacitracin-polymyxin Apply  to affected area as needed. * TYLENOL 325 mg tablet Generic drug:  acetaminophen Take 650 mg by mouth every four (4) hours as needed for Pain. Take two tabs po bid. * acetaminophen 80 mg suppository Commonly known as:  TYLENOL Insert 650 mg into rectum every four (4) hours as needed for Fever. * Notice: This list has 6 medication(s) that are the same as other medications prescribed for you. Read the directions carefully, and ask your doctor or other care provider to review them with you. Follow-up Instructions Return in about 3 months (around 7/9/2018). Introducing [facility] SERVICES! [facility] introduces [employer] patient portal. Now you can access parts of your medical record, email your doctor's office, and request medication refills online. 1. In your internet browser, go to [email or web address] 2. Click on the First Time User? Click Here link in the Sign In box. You will see the New Member Sign Up page. 3. Enter your [employer] Access Code exactly as it appears below. You will not need to use this code after youve completed the sign-up process. If you do not sign up before the expiration date, you must request a new code. · [employer] Access Code: [email or web address] Expires: 5/27/2018  4:50 PM 
 
4. Enter the last four digits of your Social Security Number (xxxx) and Date of Birth (mm/dd/yyyy) as indicated and click Submit. You will be taken to the next sign-up page. 5. Create a [employer] ID. This will be your [employer] login ID and cannot be changed, so think of one that is secure and easy to remember. 6. Create a [employer] password. You can change your password at any time. 7. Enter your Password Reset Question and Answer. This can be used at a later time if you forget your password. 8. Enter your e-mail address. You will receive e-mail notification when new information is available in [address] 9. Click Sign Up. You can now view and download portions of your medical record. 10. Click the Download Summary menu link to download a portable copy of your medical information. If you have questions, please visit the Frequently Asked Questions section of the [employer] website.  Remember, [employer] is NOT to be used for urgent needs. For medical emergencies, dial 911. Now available from your iPhone and Android! Please provide this summary of care documentation to your next provider. Your primary care clinician is listed as Feng Bah. If you have any questions after today's visit, please call [phone].

## 2018-04-09 NOTE — PROGRESS NOTES
Aaron Jc is a 80 y.o. male who presents to the office today for the following:  Chief Complaint   Patient presents with   [address] [place] [address] Anemia       Referring Provider:  Delphine Jara NP    Anemia   Pertinent negatives include no chest pain, no headaches and no shortness of breath. Subjective:  Mr. Skye Blake is a 80year old man, who we have been following for multifactorial anemia, probably related to chronic renal disease and iron deficiency, but stools have been negative for occult blood at the nursing home. I'm not sure how many actually were done too. The patient improved on iron Procrit. It appears that iron seems to have been more important. He did have transfusions in the past and he has not had Procrit since the end of December of 2017. The patient has developed problem with thrombocytopenia, but no bleeding. This was after he started Augmentin. On 01/17/18 his platelet count was 273F and similarly about a week later 149k. There was a dramatic drop to 113k on the 31st of January and then a slight rise to 116k by the 7th. Today on the 26th of February platelet counts have gone up to 121k. His HGB seems to be better than ever with a value of 10.8. His MCV is 98.2 and his RDW is normal.    He feels well and is having no problems. There has been perhaps some confusion on the type of iron I suggested he start. We have written down that I wanted him to be on ferrous fumerate 324 mg b.i.d. As far as we can tell from the nursing home, he's on ferrous sulfate. There was some question of constipation, but the patient denies that so it's not clear. The reason I ordered ferrous fumerate over ferrous sulfate is that it is a much better medication, delivers more iron per tablet with less side effects of GI toxicity, including gastric upset and at times severe constipation. See comments below.           CURRENT TREATMENT: Iron replacement, Procrit as needed, monitor thrombocytopenia    Past Medical History:   Diagnosis Date    Anemia     Arrhythmia     Afib    Chronic kidney disease     Depression     Hypercholesterolemia     Hypertension     NSTEMI (non-ST elevated myocardial infarction) ([facility] [address] 11/21/2016    [facility]:  JACKELIN to SVG-L-PLB and JACKELIN to SVG-LAD    Pacemaker 2009    Followed By Dr Tere Corona S/P CABG x 4 1995    Thyroid disease     hypothyroidism     Past Surgical History:   Procedure Laterality Date    CARDIAC SURG PROCEDURE UNLIST      pacer    CARDIAC SURG PROCEDURE UNLIST  07/20/1995    quadruple bypass     HX CORONARY ARTERY BYPASS GRAFT  1995    4V    HX PACEMAKER  04/23/2003       No flowsheet data found.]          Key Oncology Meds     Patient is on no Oncologic meds. Allergies   Allergen Reactions    Hydrocodone Nausea Only       TRANSFUSIONS: None     Social History     Social History    Marital status:      Spouse name: N/A    Number of children: N/A    Years of education: N/A     Social History Main Topics    Smoking status: Former Smoker    Smokeless tobacco: Never Used    Alcohol use 4.2 oz/week     7 Glasses of wine per week    Drug use: No    Sexual activity: Not Asked     Other Topics Concern    None     Social History Narrative   . Family History   Problem Relation Age of Onset    Glaucoma Mother     Heart Disease Father     Heart Disease Sister        Review of Systems   Constitutional: Negative for chills and diaphoresis. Respiratory: Positive for sputum production. Negative for cough, hemoptysis, shortness of breath and wheezing. Cardiovascular: Negative for chest pain and palpitations. Gastrointestinal: Negative. Genitourinary: Positive for frequency. Negative for dysuria and hematuria. Musculoskeletal: Negative. Negative for falls. Skin: Negative. Neurological: Negative for headaches. Physical Exam   Constitutional: He is well-developed, well-nourished, and in no distress. No distress.    HENT:   Head: Normocephalic and atraumatic. Mouth/Throat: Oropharynx is clear and moist. No oropharyngeal exudate. Eyes: Conjunctivae and EOM are normal. Right eye exhibits no discharge. Left eye exhibits no discharge. No scleral icterus. Neck: No tracheal deviation present. No thyromegaly present. Cardiovascular: Normal rate, regular rhythm and normal heart sounds. Exam reveals no gallop and no friction rub. No murmur heard. Pulmonary/Chest: Effort normal and breath sounds normal. No respiratory distress. He has no wheezes. He has no rales. Abdominal: Soft. He exhibits no distension. There is no hepatosplenomegaly. There is no tenderness. There is no rebound and no guarding. Musculoskeletal: He exhibits no edema, tenderness or deformity. Lymphadenopathy:     He has no cervical adenopathy. He has no axillary adenopathy. Neurological: He is alert. Skin: No rash noted. He is not diaphoretic. No erythema. Psychiatric: Mood and affect normal.     Visit Vitals    /60    Pulse 86    Temp 96 °F (35.6 °C) (Oral)    Resp 19    Ht 5' 10\" (1.778 m)    Wt 154 lb 12.8 oz (70.2 kg)    SpO2 94%    BMI 22.21 kg/m2         ASSESSMENT:  1. Iron deficiency anemia, unspecified iron deficiency anemia type    2. Anemia in stage 3 chronic kidney disease    3. Thrombocytopenia (HCC)        XR Results (maximum last 3): Results from [place] encounter on 11/15/17   XR KNEE LT 3 V   Narrative LEFT KNEE, 3 VIEWS    INDICATION:  Pain. COMPARISON:  None. FINDINGS:    There is no acute fracture or dislocation. Prepatellar soft tissue swelling  noted. There is a small suprapatellar effusion. Bone mineralization is normal.  The 3 knee compartments are preserved. There is considerable arterial  calcification noted. Impression IMPRESSION:    Prepatellar soft tissue swelling and small suprapatellar effusion.       Results from Abstract encounter on 08/21/17   XR FOOT LT MIN 3 V  Results from Abstract encounter on 09/08/16   XR CHEST PA LAT    CT Results (maximum last 3): No results found for this or any previous visit. MRI Results (maximum last 3): No results found for this or any previous visit. Nuclear Medicine Results (maximum last 3): No results found for this or any previous visit. US Results (maximum last 3): No results found for this or any previous visit. RAMIREZ Results (most recent):  No results found for this or any previous visit. VAS/US Results (maximum last 3): No results found for this or any previous visit. PET Results (maximum last 3): No results found for this or any previous visit. Results for orders placed or performed during the hospital encounter of 62/61/76   METABOLIC PANEL, BASIC   Result Value Ref Range    Sodium 141 136 - 145 mmol/L    Potassium 4.1 3.5 - 5.1 mmol/L    Chloride 107 97 - 108 mmol/L    CO2 27 21 - 32 mmol/L    Anion gap 7 5 - 15 mmol/L    Glucose 94 65 - 100 mg/dL    BUN 26 (H) 7.0 - 18.0 MG/DL    Creatinine 1.22 0.70 - 1.30 MG/DL    BUN/Creatinine ratio 21 (H) 12 - 20      GFR est AA >60 >60 ml/min/1.73m2    GFR est non-AA 55 (L) >60 ml/min/1.73m2    Calcium 8.9 8.5 - 10.1 MG/DL   CBC WITH AUTOMATED DIFF   Result Value Ref Range    WBC 5.7 4.1 - 11.1 K/uL    RBC 3.48 (L) 4.10 - 5.70 M/uL    HGB 11.0 (L) 12.1 - 17.0 g/dL    HCT 33.4 (L) 36.6 - 50.3 %    MCV 96.0 80.0 - 99.0 FL    MCH 31.6 26.0 - 34.0 PG    MCHC 32.9 30.0 - 36.5 g/dL    RDW 13.0 11.5 - 14.5 %    PLATELET 135 (L) 975 - 400 K/uL    MPV 11.2 8.9 - 12.9 FL    NRBC 0.0 0  WBC    ABSOLUTE NRBC 0.00 0.00 - 0.01 K/uL    NEUTROPHILS 47 32 - 75 %    LYMPHOCYTES 30 12 - 49 %    MONOCYTES 9 5 - 13 %    EOSINOPHILS 13 (H) 0 - 7 %    BASOPHILS 1 0 - 1 %    IMMATURE GRANULOCYTES 0 0.0 - 0.5 %    ABS. NEUTROPHILS 2.7 1.8 - 8.0 K/UL    ABS. LYMPHOCYTES 1.7 0.8 - 3.5 K/UL    ABS. MONOCYTES 0.5 0.0 - 1.0 K/UL    ABS. EOSINOPHILS 0.7 (H) 0.0 - 0.4 K/UL    ABS.  BASOPHILS 0.0 0.0 - 0.1 K/UL    ABS. IMM. GRANS. 0.0 0.00 - 0.04 K/UL    DF AUTOMATED     RETICULOCYTE COUNT   Result Value Ref Range    Reticulocyte count 0.7 0.7 - 2.1 %    Absolute Retic Cnt. 0.0254 (L) 0.0260 - 0.0950 M/ul   IRON PROFILE   Result Value Ref Range    Iron 103 65 - 175 ug/dL    TIBC 276 250 - 450 ug/dL    Iron % saturation 37 20 - 50 %     No orders of the defined types were placed in this encounter. Assessment:  1. Multifactorial anemia, secondary to mostly hx of  iron deficiency. Causation of iron deficiency is not clear. Stool was reportedly  negative for blood at the nursing home. 2. Improvement in iron and Procrit, mainly on iron. Patient not requiring Procrit over the past several months or now . 3. Thrombocytopenia, mild ,could have been related to low iron. stable     Plan (Comment):  Plan:  4. Patient is going to continue on iron at this time./ferrous fumarate   .-may be able to decrease to once per day.     5. .    6. Given advanced age, avoid endoscopic studies, but though he's older the's still fairly vigorous and decisions would be made at that time, but hopefully that would not be necessary. 7. In regard to his platelet count, it is possible that he could have bone marrow disease such as  MDS given his advanced age. But it is not very low and quite stable. Consider sending PB for molecular studies. ,avoid bone  marrow. Aspiration. .          Follow-up Disposition:  Return in about 3 months (around 7/9/2018).     Leodan Toussaint MD

## 2018-07-09 ENCOUNTER — OFFICE VISIT (OUTPATIENT)
Dept: ONCOLOGY | Age: 83
End: 2018-07-09

## 2018-07-09 VITALS
HEART RATE: 78 BPM | BODY MASS INDEX: 21.73 KG/M2 | HEIGHT: 70 IN | DIASTOLIC BLOOD PRESSURE: 85 MMHG | WEIGHT: 151.8 LBS | RESPIRATION RATE: 16 BRPM | SYSTOLIC BLOOD PRESSURE: 155 MMHG | TEMPERATURE: 94.7 F | OXYGEN SATURATION: 96 %

## 2018-07-09 DIAGNOSIS — D63.1 ANEMIA IN STAGE 3 CHRONIC KIDNEY DISEASE (HCC): ICD-10-CM

## 2018-07-09 DIAGNOSIS — D69.6 THROMBOCYTOPENIA (HCC): ICD-10-CM

## 2018-07-09 DIAGNOSIS — N18.30 ANEMIA IN STAGE 3 CHRONIC KIDNEY DISEASE (HCC): ICD-10-CM

## 2018-07-09 DIAGNOSIS — D50.9 IRON DEFICIENCY ANEMIA, UNSPECIFIED IRON DEFICIENCY ANEMIA TYPE: Primary | ICD-10-CM

## 2018-07-09 NOTE — PROGRESS NOTES
Keshia Moeller is a 80 y.o. male who presents to the office today for the following:  Chief Complaint   Patient presents with   [place] Anemia       Referring Provider:  Claudius Litten, NP    Anemia   Pertinent negatives include no chest pain, no headaches and no shortness of breath. Subjective:  Mr. Marquez Singh is a 80year old man, who we have been following for multifactorial anemia, probably related to chronic renal disease and iron deficiency, but stools have been negative for occult blood at the nursing home. I'm not sure how many actually were done too. The patient improved on iron Procrit. It appears that iron seems to have been more important. He did have transfusions in the past and he has not had Procrit since the end of December of 2017. The patient has developed problem with thrombocytopenia, but no bleeding. This was after he started Augmentin. On 01/17/18 his platelet count was 550P and similarly about a week later 149k. There was a dramatic drop to 113k on the 31st of January and then a slight rise to 116k by the 7th. Today on the 26th of February platelet counts have gone up to 121k. His HGB seems to be better than ever with a value of 10.8. His MCV is 98.2 and his RDW is normal.    He feels well and is having no problems. There has been perhaps some confusion on the type of iron I suggested he start. We have written down that I wanted him to be on ferrous fumerate 324 mg b.i.d. As far as we can tell from the nursing home, he's on ferrous sulfate. There was some question of constipation, but the patient denies that so it's not clear. The reason I ordered ferrous fumerate over ferrous sulfate is that it is a much better medication, delivers more iron per tablet with less side effects of GI toxicity, including gastric upset and at times severe constipation. See comments below.           CURRENT TREATMENT: Iron replacement, Procrit as needed, monitor thrombocytopenia    Past Medical History:   Diagnosis Date    Anemia     Arrhythmia     Afib    Chronic kidney disease     Depression     Hypercholesterolemia     Hypertension     NSTEMI (non-ST elevated myocardial infarction) ([facility] [address] 11/21/2016    [facility]:  JACKELIN to SVG-L-PLB and JACKELIN to SVG-LAD    Pacemaker 2009    Followed By Dr Maureen Rouse S/P CABG x 4 1995    Thyroid disease     hypothyroidism     Past Surgical History:   Procedure Laterality Date    CARDIAC SURG PROCEDURE UNLIST      pacer    CARDIAC SURG PROCEDURE UNLIST  07/20/1995    quadruple bypass     HX CORONARY ARTERY BYPASS GRAFT  1995    4V    HX PACEMAKER  04/23/2003       No flowsheet data found.]          Key Oncology Meds     Patient is on no Oncologic meds. Allergies   Allergen Reactions    Hydrocodone Nausea Only       TRANSFUSIONS: None     Social History     Social History    Marital status:      Spouse name: N/A    Number of children: N/A    Years of education: N/A     Social History Main Topics    Smoking status: Former Smoker    Smokeless tobacco: Never Used    Alcohol use 4.2 oz/week     7 Glasses of wine per week    Drug use: No    Sexual activity: Not Asked     Other Topics Concern    None     Social History Narrative   . Family History   Problem Relation Age of Onset    Glaucoma Mother     Heart Disease Father     Heart Disease Sister        Review of Systems   Constitutional: Negative for chills and diaphoresis. Respiratory: Positive for sputum production. Negative for cough, hemoptysis, shortness of breath and wheezing. Cardiovascular: Negative for chest pain and palpitations. Gastrointestinal: Negative. Genitourinary: Positive for frequency. Negative for dysuria and hematuria. Musculoskeletal: Negative. Negative for falls. Skin: Negative. Neurological: Negative for headaches. Physical Exam   Constitutional: He is well-developed, well-nourished, and in no distress. No distress.    HENT:   Head: Normocephalic and atraumatic. Mouth/Throat: Oropharynx is clear and moist. No oropharyngeal exudate. Eyes: Conjunctivae and EOM are normal. Right eye exhibits no discharge. Left eye exhibits no discharge. No scleral icterus. Neck: No tracheal deviation present. No thyromegaly present. Cardiovascular: Normal rate, regular rhythm and normal heart sounds. Exam reveals no gallop and no friction rub. No murmur heard. Pulmonary/Chest: Effort normal and breath sounds normal. No respiratory distress. He has no wheezes. He has no rales. Abdominal: Soft. He exhibits no distension. There is no hepatosplenomegaly. There is no tenderness. There is no rebound and no guarding. Musculoskeletal: He exhibits no edema, tenderness or deformity. Lymphadenopathy:     He has no cervical adenopathy. He has no axillary adenopathy. Neurological: He is alert. Skin: No rash noted. He is not diaphoretic. No erythema. Psychiatric: Mood and affect normal.     Visit Vitals    /85    Pulse 78    Temp (!) 94.7 °F (34.8 °C) (Oral)    Resp 16    Ht 5' 10\" (1.778 m)    Wt 151 lb 12.8 oz (68.9 kg)    SpO2 96%    BMI 21.78 kg/m2         ASSESSMENT:  1. Iron deficiency anemia, unspecified iron deficiency anemia type    2. Anemia in stage 3 chronic kidney disease    3. Thrombocytopenia (HCC)        XR Results (maximum last 3): Results from [place] encounter on 11/15/17   XR KNEE LT 3 V   Narrative LEFT KNEE, 3 VIEWS    INDICATION:  Pain. COMPARISON:  None. FINDINGS:    There is no acute fracture or dislocation. Prepatellar soft tissue swelling  noted. There is a small suprapatellar effusion. Bone mineralization is normal.  The 3 knee compartments are preserved. There is considerable arterial  calcification noted. Impression IMPRESSION:    Prepatellar soft tissue swelling and small suprapatellar effusion.       Results from Abstract encounter on 08/21/17   XR FOOT LT MIN 3 V  Results from Abstract encounter on 09/08/16   XR CHEST PA LAT    CT Results (maximum last 3): No results found for this or any previous visit. MRI Results (maximum last 3): No results found for this or any previous visit. Nuclear Medicine Results (maximum last 3): No results found for this or any previous visit. US Results (maximum last 3): No results found for this or any previous visit. RAMIREZ Results (most recent):  No results found for this or any previous visit. VAS/US Results (maximum last 3): No results found for this or any previous visit. PET Results (maximum last 3): No results found for this or any previous visit. Results for orders placed or performed during the hospital encounter of 15/07/45   METABOLIC PANEL, COMPREHENSIVE   Result Value Ref Range    Sodium 142 136 - 145 mmol/L    Potassium 4.4 3.5 - 5.1 mmol/L    Chloride 107 97 - 108 mmol/L    CO2 25 21 - 32 mmol/L    Anion gap 10 5 - 15 mmol/L    Glucose 86 65 - 100 mg/dL    BUN 30 (H) 6 - 20 MG/DL    Creatinine 1.34 (H) 0.70 - 1.30 MG/DL    BUN/Creatinine ratio 22 (H) 12 - 20      GFR est AA >60 >60 ml/min/1.73m2    GFR est non-AA 50 (L) >60 ml/min/1.73m2    Calcium 9.1 8.5 - 10.1 MG/DL    Bilirubin, total 0.5 0.2 - 1.0 MG/DL    ALT (SGPT) 21 12 - 78 U/L    AST (SGOT) 30 15 - 37 U/L    Alk.  phosphatase 109 45 - 117 U/L    Protein, total 7.4 6.4 - 8.2 g/dL    Albumin 3.8 3.5 - 5.0 g/dL    Globulin 3.6 2.0 - 4.0 g/dL    A-G Ratio 1.1 1.1 - 2.2     CBC WITH AUTOMATED DIFF   Result Value Ref Range    WBC 6.0 4.1 - 11.1 K/uL    RBC 3.63 (L) 4.10 - 5.70 M/uL    HGB 11.2 (L) 12.1 - 17.0 g/dL    HCT 34.2 (L) 36.6 - 50.3 %    MCV 94.2 80.0 - 99.0 FL    MCH 30.9 26.0 - 34.0 PG    MCHC 32.7 30.0 - 36.5 g/dL    RDW 12.9 11.5 - 14.5 %    PLATELET 145 (L) 793 - 400 K/uL    MPV 10.7 8.9 - 12.9 FL    NRBC 0.0 0  WBC    ABSOLUTE NRBC 0.00 0.00 - 0.01 K/uL    NEUTROPHILS PENDING %    LYMPHOCYTES PENDING %    MONOCYTES PENDING %    EOSINOPHILS PENDING %    BASOPHILS PENDING %    IMMATURE GRANULOCYTES PENDING %    ABS. NEUTROPHILS PENDING K/UL    ABS. LYMPHOCYTES PENDING K/UL    ABS. MONOCYTES PENDING K/UL    ABS. EOSINOPHILS PENDING K/UL    ABS. BASOPHILS PENDING K/UL    ABS. IMM. GRANS. PENDING K/UL    DF PENDING    IRON PROFILE   Result Value Ref Range    Iron 106 65 - 175 ug/dL    TIBC 270 250 - 450 ug/dL    Iron % saturation 39 20 - 50 %     No orders of the defined types were placed in this encounter. Assessment:  1. Multifactorial anemia, secondary to mostly hx of  iron deficiency. stable to improved. Causation of iron deficiency is not clear. Stool was reportedly  negative for blood at the nursing home. 2. Improvement in iron and Procrit, mainly on iron. Patient not requiring Procrit over the past several months or now . 3. Thrombocytopenia, mild ,could have been related to low iron. stable     Plan (Comment):  Plan:  4. Patient is going to continue on iron at this time./ferrous fumarate   .-may be able to decrease to once per day.     5. .   Bowel regimen discussed   6. Given advanced age, avoid endoscopic studies, but though he's older the's still fairly vigorous and decisions would be made at that time, but hopefully that would not be necessary. 7. In regard to his platelet count, it is possible that he could have bone marrow disease such as  MDS given his advanced age. But it is not very low and quite stable. Consider sending PB for molecular studies. ,avoid bone  marrow. Aspiration. .          Follow-up Disposition:  Return in about 3 months (around 10/9/2018) for OV.     Jovany Orellana MD

## 2018-07-09 NOTE — PROGRESS NOTES
Saurabh Cho is a 80 y.o. male feels good, playing golf. Patient taking ferrous fumarate 324mg BID, and B12 inections 1000mcg every 3 weeks.

## 2018-07-09 NOTE — MR AVS SNAPSHOT
[address] [id number] [phone] Patient: Terrell Ayon MRN: [id number] RZY:3/20/1964 Visit Information Date & Time Provider Department Dept. Phone Encounter #  
 7/9/2018  1:00 PM Abhishek Willard MD [facility]  Follow-up Instructions Return for OV. Upcoming Health Maintenance Date Due Pneumococcal 65+ Low/Medium Risk (2 of 2 - PPSV23) 9/11/2016 MEDICARE YEARLY EXAM 3/14/2018 Influenza Age 5 to Adult 8/1/2018 GLAUCOMA SCREENING Q2Y 8/20/2019 DTaP/Tdap/Td series (2 - Td) 8/20/2027 Allergies as of 7/9/2018  Review Complete On: 7/9/2018 By: Sonia Carty RN Severity Noted Reaction Type Reactions Hydrocodone  05/06/2015    Nausea Only Current Immunizations  Reviewed on 7/9/2018 Name Date Influenza High Dose Vaccine PF 11/6/2017 Influenza Vaccine 10/15/2015 Pneumococcal Conjugate (PCV-13) 9/11/2015 Reviewed by Sonia Carty RN on 7/9/2018 at  1:29 PM  
You Were Diagnosed With   
  
 Codes Comments Iron deficiency anemia, unspecified iron deficiency anemia type    -  Primary ICD-10-CM: D50.9 ICD-9-CM: 280.9 Anemia in stage 3 chronic kidney disease     ICD-10-CM: N18.3, D63.1 ICD-9-CM: 285.21, 585.3 Thrombocytopenia ([facility] [address]     ICD-10-CM: D69.6 ICD-9-CM: 287.5 Vitals BP Pulse Temp Resp Height(growth percentile) Weight(growth percentile) 155/85 78 (!) 94.7 °F (34.8 °C) (Oral) 16 5' 10\" (1.778 m) 151 lb 12.8 oz (68.9 kg) SpO2 BMI Smoking Status 96% 21.78 kg/m2 Former Smoker Vitals History BMI and BSA Data Body Mass Index Body Surface Area 21.78 kg/m 2 1.84 m 2 Preferred Pharmacy Pharmacy Name Phone [address], [address] [phone] Your Updated Medication List  
  
   
 This list is accurate as of 7/9/18  2:04 PM.  Always use your most recent med list.  
  
  
  
  
 albuterol 2.5 mg /3 mL (0.083 %) nebulizer solution Commonly known as:  PROVENTIL VENTOLIN  
2.5 mg by Nebulization route as needed for Wheezing. Every 6 hrs prn SOB. atorvastatin 80 mg tablet Commonly known as:  LIPITOR Take 1 Tab by mouth daily. AUGMENTIN 875-125 mg per tablet Generic drug:  amoxicillin-clavulanate Take 1 Tab by mouth two (2) times a day. Indications: ? tooth infection dentured rubbed, take for 10 days started 1/25/18  
  
 calcium polycarbophil 625 mg tablet Commonly known as:  FIBER-TABS Take 1 Tab by mouth two (2) times a day. Indications: DIARRHEA  
  
 cyanocobalamin 1,000 mcg/mL injection Commonly known as:  VITAMIN B12  
1,000 mcg by IntraMUSCular route every month. Every 3 weeks DULCOLAX (BISACODYL) 10 mg suppository Generic drug:  bisacodyl Insert 10 mg into rectum as needed. escitalopram oxalate 5 mg tablet Commonly known as:  Andres Huff Take 1 Tab by mouth daily. famotidine 20 mg tablet Commonly known as:  PEPCID Take 1 Tab by mouth two (2) times a day. Ferrous Fumarate 325 mg (106 mg iron) Tab Take  by mouth two (2) times a day. finasteride 5 mg tablet Commonly known as:  PROSCAR Take 1 Tab by mouth daily. Indications: SYMPTOMATIC BENIGN PROSTATIC HYPERPLASIA  
  
 hydrocortisone 2.5 % rectal cream  
Commonly known as:  ANUSOL-HC Insert  into rectum as needed for Hemorrhoids. loratadine 10 mg tablet Commonly known as:  Tapan Hines Take 1 Tab by mouth daily. Indications: ALLERGIC RHINITIS MAALOX ADVANCED 200-200-20 mg/5 mL Susp Generic drug:  alum-mag hydroxide-simeth Take 30 mL by mouth every four (4) hours as needed. mineral oil enema Commonly known as:  FLEET Insert  into rectum as needed for Constipation (daily as needed). multivitamin tablet Commonly known as:  ONE A DAY  
 Take 1 Tab by mouth daily. nitroglycerin 0.4 mg SL tablet Commonly known as:  NITROSTAT  
by SubLINGual route every five (5) minutes as needed for Chest Pain (not to exceed 3 doses). * OTHER Spiritus Anant ([address] [place] [address]) May keep wine in room. * OTHER Natural Laxative 30cc po every day CLEMENS MILK OF MAGNESIA 400 mg/5 mL suspension Generic drug:  magnesium hydroxide Take 30 mL by mouth daily as needed for Constipation. Protein Supplement Liqd Take 1 CUP by mouth two (2) times a day. sodium chloride 0.65 % nasal squeeze bottle Commonly known as:  OCEAN  
2 Sprays by Both Nostrils route four (4) times daily. SUPPOSITORY ADULT suppository Generic drug:  glycerin (adult) Insert 1 Suppository into rectum as needed. SYNTHROID 75 mcg tablet Generic drug:  levothyroxine Take  by mouth Daily (before breakfast). traMADol 50 mg tablet Commonly known as:  ULTRAM  
Take 50 mg by mouth every eight (8) hours as needed for Pain. TRIPLE ANTIBIOTIC 3.5mg-400 unit- 5,000 unit/gram ointment Generic drug:  neomycin-bacitracin-polymyxin Apply  to affected area as needed. * TYLENOL 325 mg tablet Generic drug:  acetaminophen Take 650 mg by mouth every four (4) hours as needed for Pain. Take two tabs po bid. * acetaminophen 80 mg suppository Commonly known as:  TYLENOL Insert 650 mg into rectum every four (4) hours as needed for Fever. XARELTO 15 mg Tab tablet Generic drug:  rivaroxaban Take  by mouth daily. * Notice: This list has 4 medication(s) that are the same as other medications prescribed for you. Read the directions carefully, and ask your doctor or other care provider to review them with you. Follow-up Instructions Return for OV. Introducing [facility]!    
 Wilman Don introduces [employer] patient portal. Now you can access parts of your medical record, email your doctor's office, and request medication refills online. 1. In your internet browser, go to [email or web address] "[email or web address]/[email or web address] 2. Click on the First Time User? Click Here link in the Sign In box. You will see the New Member Sign Up page. 3. Enter your [employer] Access Code exactly as it appears below. You will not need to use this code after youve completed the sign-up process. If you do not sign up before the expiration date, you must request a new code. · [employer] Access Code: [email or web address] Expires: 9/5/2018 11:33 AM 
 
4. Enter the last four digits of your Social Security Number (xxxx) and Date of Birth (mm/dd/yyyy) as indicated and click Submit. You will be taken to the next sign-up page. 5. Create a [employer] ID. This will be your [employer] login ID and cannot be changed, so think of one that is secure and easy to remember. 6. Create a [employer] password. You can change your password at any time. 7. Enter your Password Reset Question and Answer. This can be used at a later time if you forget your password. 8. Enter your e-mail address. You will receive e-mail notification when new information is available in [address] 9. Click Sign Up. You can now view and download portions of your medical record. 10. Click the Download Summary menu link to download a portable copy of your medical information. If you have questions, please visit the Frequently Asked Questions section of the [employer] website. Remember, [employer] is NOT to be used for urgent needs. For medical emergencies, dial 911. Now available from your iPhone and Android! Please provide this summary of care documentation to your next provider. Your primary care clinician is listed as Mercedes Mello. If you have any questions after today's visit, please call [phone].

## 2018-10-15 ENCOUNTER — OFFICE VISIT (OUTPATIENT)
Dept: ONCOLOGY | Age: 83
End: 2018-10-15

## 2018-10-15 VITALS
OXYGEN SATURATION: 94 % | SYSTOLIC BLOOD PRESSURE: 117 MMHG | HEART RATE: 84 BPM | HEIGHT: 70 IN | RESPIRATION RATE: 20 BRPM | DIASTOLIC BLOOD PRESSURE: 72 MMHG | TEMPERATURE: 98.2 F | BODY MASS INDEX: 22.05 KG/M2 | WEIGHT: 154 LBS

## 2018-10-15 DIAGNOSIS — D50.9 IRON DEFICIENCY ANEMIA, UNSPECIFIED IRON DEFICIENCY ANEMIA TYPE: ICD-10-CM

## 2018-10-15 DIAGNOSIS — Z86.2 HISTORY OF ANEMIA DUE TO CHRONIC KIDNEY DISEASE: Primary | ICD-10-CM

## 2018-10-15 DIAGNOSIS — D51.9 ANEMIA DUE TO VITAMIN B12 DEFICIENCY, UNSPECIFIED B12 DEFICIENCY TYPE: ICD-10-CM

## 2018-10-15 DIAGNOSIS — N18.9 HISTORY OF ANEMIA DUE TO CHRONIC KIDNEY DISEASE: Primary | ICD-10-CM

## 2018-10-15 NOTE — PROGRESS NOTES
Rogelio De Leon is a 80 y.o. male denies any new complaints of SOB, or tiredness. Taking ferrous fumarate 324 mg BID. Patient taking ferrous fumarate 324mg BID.

## 2018-10-15 NOTE — PROGRESS NOTES
Hematology Oncology Progress Note    Eboni Lopez is a 80 y.o. male with multifactorial anemia likely anemia of CKD and iron deficiency is now here for a 3 month follow up visit. He received PRBC transfusions in the past but has never been on Procrit. Huis creatinine ranges 1.2 to 1.3 and his hemoglobin has lately been ranging 10-11. Chart notes reviewed since last visit. Chief complaints: He complains of fatigue, dyspnea on exertion, diarrhea on & off. He denies any loss of weight or apetite, bloody or black stools or any other major complaints at this time. Visit Vitals    /72    Pulse 84    Temp 98.2 °F (36.8 °C) (Oral)    Resp 20    Ht 5' 10\" (1.778 m)    Wt 154 lb (69.9 kg)    SpO2 94%    BMI 22.1 kg/m2     Review of Systems:  Constitutional No fevers, chills, night sweats, excessive fatigue or weight loss. Allergic/Immunologic No recent allergic reactions   Eyes No significant visual difficulties. No diplopia. ENMT No problems with hearing, no sore throat, no sinus drainage. Endocrine No hot flashes or night sweats. No cold intolerance, polyuria, or polydipsia   Hematologic/Lymphatic No easy bruising or bleeding. The patient denies any tender or palpable lymph nodes   Breasts No abnormal masses of breast, nipple discharge or pain. Respiratory No dyspnea on exertion, orthopnea, chest pain, cough or hemoptysis. Cardiovascular No anginal chest pain, irregular heart beat, tachycardia, palpitations or orthopnea. Gastrointestinal No nausea, vomiting, diarrhea, constipation, cramping, dysphagia, reflux, heartburn, GI bleeding, or early satiety. No change in bowel habits. Genitourinary (M) No hematuria, dysuria, increased frequency, urgency, hesitancy or incontinence. Musculoskeletal No joint pain, swelling or redness. No decreased range of motion. Integumentary No chronic rashes, inflammation, ulcerations, pruritus, petechiae, purpura, ecchymoses, or skin changes. Neurologic No headache, blurred vision, and no areas of focal weakness or numbness. Normal gait. No sensory problems. Psychiatric No insomnia, depression, yesenia or mood swings. No psychotropic drugs. Physical Examination: ECOG PS 1. Constitutional Alert, cooperative, oriented. Mood and affect appropriate. Appears close to chronological age. Well nourished. Well developed. Head Normocephalic; no scars   Eyes Pale conjunctivae, anicteric sclerae. Pupils are reactive and equal.   ENMT Sinuses are nontender. No oral exudates, ulcers, masses, thrush or mucositis. Oropharynx clear. Tongue normal.   Neck Supple without masses or thyromegaly. No jugular venous distension. Hematologic/Lymphatic No petechiae or purpura. No tender or palpable lymph nodes in the cervical, supraclavicular, axillary or inguinal area. Respiratory Lungs are clear to auscultation without rhonchi or wheezing. Cardiovascular Regular rate and rhythm of heart without murmurs, gallops or rubs. Chest / Line Site Chest is symmetric with no chest wall deformities. Abdomen Non-tender, non-distended, no masses, ascites or hepatosplenomegaly. Good bowel sounds. No guarding or rebound tenderness. No pulsatile masses. Musculoskeletal No tenderness or swelling, normal range of motion without obvious weakness. Extremities No visible deformities, no cyanosis, clubbing or edema. Skin No rashes, scars, or lesions suggestive of malignancy. No petechiae, purpura, or ecchymoses. No excoriations. Neurologic No sensory or motor deficits, normal cerebellar function, normal gait, cranial nerves intact. Psychiatric Alert and oriented times three. Coherent speech. Verbalizes understanding of our discussions today.        Labs:  WBC 5.7  4.1 - 11.1 K/uL Final   RBC 3.38 (L) 4.10 - 5.70 M/uL Final   HGB 10.6 (L) 12.1 - 17.0 g/dL Final   HCT 31.9 (L) 36.6 - 50.3 % Final   MCV 94.4  80.0 - 99.0 FL Final   MCH 31.4  26.0 - 34.0 PG Final   MCHC 33.2  30.0 - 36.5 g/dL Final   RDW 13.3  11.5 - 14.5 % Final   PLATELET 145 (L) 996 - 400 K/uL Final   MPV 10.5  8.9 - 12.9 FL Final   NEUTROPHILS 50  32 - 75 % Final   LYMPHOCYTES 28  12 - 49 % Final   MONOCYTES 11  5 - 13 % Final   EOSINOPHILS 11 (H) 0 - 7 % Final   BASOPHILS 0  0 - 1 % Final   ABS. NEUTROPHILS 2.8  1.8 - 8.0 K/UL Final   ABS. LYMPHOCYTES 1.6  0.8 - 3.5 K/UL Final   ABS. MONOCYTES 0.6  0.0 - 1.0 K/UL Final     Sodium 143  136 - 145 mmol/L Final   Potassium 3.9  3.5 - 5.1 mmol/L Final   Chloride 107  97 - 108 mmol/L Final   CO2 28  21 - 32 mmol/L Final   Anion gap 8  5 - 15 mmol/L Final   Glucose 87  65 - 100 mg/dL Final   BUN 21 (H) 6 - 20 MG/DL Final   Creatinine 1.51 (H) 0.70 - 1.30 MG/DL Final   BUN/Creatinine ratio 14  12 - 20   Final   GFR est AA 52 (L) >60 ml/min/1.73m2 Final   GFR est non-AA 43 (L) >60 ml/min/1.73m2 Final     Assessment and Plan:     1. Multifactorial anemia likely anemia of CKD with iron deficiency cannot rule out possible underlying MDS:    - His H&H today is 10.6 & 31.9 and is creatinine is elevated at 1.51  - Continue Ferrous fumerate 324mg twice daily and B12 1000mcg once every 3 weeks. - Closely monitor CBC, BMP every 3-6 months. - Next follow up in 6 months with repeat CBC, BMP, iron studies, feritin, B12 & folate levels. 2. 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 His platelet count today is 135K. - No aggressive interventions recommended. We will continue to monitor.

## 2018-11-01 ENCOUNTER — HOSPITAL ENCOUNTER (INPATIENT)
Age: 83
LOS: 5 days | Discharge: NURSING, REHAB OR LONG-TERM CARE | DRG: 377 | End: 2018-11-07
Attending: EMERGENCY MEDICINE | Admitting: INTERNAL MEDICINE
Payer: MEDICARE

## 2018-11-01 DIAGNOSIS — K92.2 ACUTE GI BLEEDING: ICD-10-CM

## 2018-11-01 DIAGNOSIS — Z79.01 CHRONIC ANTICOAGULATION: ICD-10-CM

## 2018-11-01 DIAGNOSIS — I48.19 PERSISTENT ATRIAL FIBRILLATION (HCC): ICD-10-CM

## 2018-11-01 DIAGNOSIS — R55 NEAR SYNCOPE: ICD-10-CM

## 2018-11-01 DIAGNOSIS — I95.1 ORTHOSTATIC HYPOTENSION: ICD-10-CM

## 2018-11-01 DIAGNOSIS — D62 ACUTE BLOOD LOSS ANEMIA: Primary | ICD-10-CM

## 2018-11-01 DIAGNOSIS — J96.01 ACUTE RESPIRATORY FAILURE WITH HYPOXIA (HCC): ICD-10-CM

## 2018-11-01 LAB
BASOPHILS # BLD: 0 K/UL (ref 0–0.1)
BASOPHILS NFR BLD: 0 % (ref 0–1)
DIFFERENTIAL METHOD BLD: ABNORMAL
EOSINOPHIL # BLD: 0.6 K/UL (ref 0–0.4)
EOSINOPHIL NFR BLD: 11 % (ref 0–7)
ERYTHROCYTE [DISTWIDTH] IN BLOOD BY AUTOMATED COUNT: 15.4 % (ref 11.5–14.5)
HCT VFR BLD AUTO: 23.5 % (ref 36.6–50.3)
HGB BLD-MCNC: 7.4 G/DL (ref 12.1–17)
IMM GRANULOCYTES # BLD: 0 K/UL (ref 0–0.04)
IMM GRANULOCYTES NFR BLD AUTO: 0 % (ref 0–0.5)
LYMPHOCYTES # BLD: 1.7 K/UL (ref 0.8–3.5)
LYMPHOCYTES NFR BLD: 30 % (ref 12–49)
MCH RBC QN AUTO: 31.4 PG (ref 26–34)
MCHC RBC AUTO-ENTMCNC: 31.5 G/DL (ref 30–36.5)
MCV RBC AUTO: 99.6 FL (ref 80–99)
MONOCYTES # BLD: 0.5 K/UL (ref 0–1)
MONOCYTES NFR BLD: 8 % (ref 5–13)
NEUTS SEG # BLD: 2.8 K/UL (ref 1.8–8)
NEUTS SEG NFR BLD: 50 % (ref 32–75)
NRBC # BLD: 0 K/UL (ref 0–0.01)
NRBC BLD-RTO: 0 PER 100 WBC
PLATELET # BLD AUTO: 133 K/UL (ref 150–400)
PMV BLD AUTO: 10.1 FL (ref 8.9–12.9)
RBC # BLD AUTO: 2.36 M/UL (ref 4.1–5.7)
WBC # BLD AUTO: 5.5 K/UL (ref 4.1–11.1)

## 2018-11-01 PROCEDURE — 85025 COMPLETE CBC W/AUTO DIFF WBC: CPT | Performed by: EMERGENCY MEDICINE

## 2018-11-01 PROCEDURE — 77030027138 HC INCENT SPIROMETER -A

## 2018-11-01 PROCEDURE — 96361 HYDRATE IV INFUSION ADD-ON: CPT

## 2018-11-01 PROCEDURE — 96360 HYDRATION IV INFUSION INIT: CPT

## 2018-11-01 PROCEDURE — 99285 EMERGENCY DEPT VISIT HI MDM: CPT

## 2018-11-01 PROCEDURE — 94762 N-INVAS EAR/PLS OXIMTRY CONT: CPT

## 2018-11-01 PROCEDURE — 36415 COLL VENOUS BLD VENIPUNCTURE: CPT | Performed by: EMERGENCY MEDICINE

## 2018-11-01 PROCEDURE — 74011250636 HC RX REV CODE- 250/636: Performed by: EMERGENCY MEDICINE

## 2018-11-01 RX ORDER — SODIUM CHLORIDE 9 MG/ML
75 INJECTION, SOLUTION INTRAVENOUS CONTINUOUS
Status: DISCONTINUED | OUTPATIENT
Start: 2018-11-01 | End: 2018-11-02

## 2018-11-01 RX ADMIN — SODIUM CHLORIDE 125 ML/HR: 900 INJECTION, SOLUTION INTRAVENOUS at 22:20

## 2018-11-02 ENCOUNTER — APPOINTMENT (OUTPATIENT)
Dept: GENERAL RADIOLOGY | Age: 83
DRG: 377 | End: 2018-11-02
Attending: INTERNAL MEDICINE
Payer: MEDICARE

## 2018-11-02 PROBLEM — K92.2 GI BLEED: Status: ACTIVE | Noted: 2018-11-02

## 2018-11-02 LAB
ABO + RH BLD: NORMAL
ANION GAP SERPL CALC-SCNC: 8 MMOL/L (ref 5–15)
BLOOD GROUP ANTIBODIES SERPL: NORMAL
BNP SERPL-MCNC: 7797 PG/ML (ref 0–450)
BUN SERPL-MCNC: 19 MG/DL (ref 6–20)
BUN/CREAT SERPL: 17 (ref 12–20)
CALCIUM SERPL-MCNC: 7.5 MG/DL (ref 8.5–10.1)
CHLORIDE SERPL-SCNC: 116 MMOL/L (ref 97–108)
CO2 SERPL-SCNC: 23 MMOL/L (ref 21–32)
CREAT SERPL-MCNC: 1.14 MG/DL (ref 0.7–1.3)
ERYTHROCYTE [DISTWIDTH] IN BLOOD BY AUTOMATED COUNT: 15.4 % (ref 11.5–14.5)
GLUCOSE SERPL-MCNC: 87 MG/DL (ref 65–100)
HCT VFR BLD AUTO: 22.6 % (ref 36.6–50.3)
HCT VFR BLD AUTO: 23.8 % (ref 36.6–50.3)
HGB BLD-MCNC: 7.4 G/DL (ref 12.1–17)
HGB BLD-MCNC: 7.8 G/DL (ref 12.1–17)
HGB BLD-MCNC: 8.9 G/DL (ref 12.1–17)
INR PPP: 1.3 (ref 0.9–1.1)
MCH RBC QN AUTO: 32.6 PG (ref 26–34)
MCHC RBC AUTO-ENTMCNC: 32.7 G/DL (ref 30–36.5)
MCV RBC AUTO: 99.6 FL (ref 80–99)
NRBC # BLD: 0 K/UL (ref 0–0.01)
NRBC BLD-RTO: 0 PER 100 WBC
PLATELET # BLD AUTO: 129 K/UL (ref 150–400)
PMV BLD AUTO: 10.1 FL (ref 8.9–12.9)
POTASSIUM SERPL-SCNC: 3.9 MMOL/L (ref 3.5–5.1)
PROTHROMBIN TIME: 13.4 SEC (ref 9–11.1)
RBC # BLD AUTO: 2.27 M/UL (ref 4.1–5.7)
SODIUM SERPL-SCNC: 147 MMOL/L (ref 136–145)
SPECIMEN EXP DATE BLD: NORMAL
WBC # BLD AUTO: 7.2 K/UL (ref 4.1–11.1)

## 2018-11-02 PROCEDURE — 71045 X-RAY EXAM CHEST 1 VIEW: CPT

## 2018-11-02 PROCEDURE — 74011250636 HC RX REV CODE- 250/636: Performed by: INTERNAL MEDICINE

## 2018-11-02 PROCEDURE — 85610 PROTHROMBIN TIME: CPT | Performed by: INTERNAL MEDICINE

## 2018-11-02 PROCEDURE — 65660000000 HC RM CCU STEPDOWN

## 2018-11-02 PROCEDURE — 86900 BLOOD TYPING SEROLOGIC ABO: CPT | Performed by: INTERNAL MEDICINE

## 2018-11-02 PROCEDURE — 74011250636 HC RX REV CODE- 250/636

## 2018-11-02 PROCEDURE — 80048 BASIC METABOLIC PNL TOTAL CA: CPT | Performed by: INTERNAL MEDICINE

## 2018-11-02 PROCEDURE — C9113 INJ PANTOPRAZOLE SODIUM, VIA: HCPCS | Performed by: INTERNAL MEDICINE

## 2018-11-02 PROCEDURE — 85018 HEMOGLOBIN: CPT | Performed by: INTERNAL MEDICINE

## 2018-11-02 PROCEDURE — 36415 COLL VENOUS BLD VENIPUNCTURE: CPT | Performed by: INTERNAL MEDICINE

## 2018-11-02 PROCEDURE — 74011250637 HC RX REV CODE- 250/637: Performed by: INTERNAL MEDICINE

## 2018-11-02 PROCEDURE — 83880 ASSAY OF NATRIURETIC PEPTIDE: CPT | Performed by: INTERNAL MEDICINE

## 2018-11-02 PROCEDURE — 85027 COMPLETE CBC AUTOMATED: CPT | Performed by: INTERNAL MEDICINE

## 2018-11-02 RX ORDER — FACIAL-BODY WIPES
10 EACH TOPICAL AS NEEDED
Status: DISCONTINUED | OUTPATIENT
Start: 2018-11-02 | End: 2018-11-05

## 2018-11-02 RX ORDER — ATORVASTATIN CALCIUM 40 MG/1
80 TABLET, FILM COATED ORAL DAILY
Status: DISCONTINUED | OUTPATIENT
Start: 2018-11-02 | End: 2018-11-07 | Stop reason: HOSPADM

## 2018-11-02 RX ORDER — FUROSEMIDE 10 MG/ML
20 INJECTION INTRAMUSCULAR; INTRAVENOUS 2 TIMES DAILY
Status: DISCONTINUED | OUTPATIENT
Start: 2018-11-02 | End: 2018-11-02

## 2018-11-02 RX ORDER — FUROSEMIDE 10 MG/ML
20 INJECTION INTRAMUSCULAR; INTRAVENOUS ONCE
Status: DISCONTINUED | OUTPATIENT
Start: 2018-11-03 | End: 2018-11-02

## 2018-11-02 RX ORDER — FUROSEMIDE 40 MG/5ML
20 SOLUTION ORAL DAILY
Status: DISCONTINUED | OUTPATIENT
Start: 2018-11-03 | End: 2018-11-02

## 2018-11-02 RX ORDER — ESCITALOPRAM OXALATE 10 MG/1
5 TABLET ORAL DAILY
Status: DISCONTINUED | OUTPATIENT
Start: 2018-11-02 | End: 2018-11-07 | Stop reason: HOSPADM

## 2018-11-02 RX ORDER — CYANOCOBALAMIN 1000 UG/ML
1000 INJECTION, SOLUTION INTRAMUSCULAR; SUBCUTANEOUS
Status: DISCONTINUED | OUTPATIENT
Start: 2018-11-16 | End: 2018-11-07 | Stop reason: HOSPADM

## 2018-11-02 RX ORDER — TRAMADOL HYDROCHLORIDE 50 MG/1
50 TABLET ORAL
Status: DISCONTINUED | OUTPATIENT
Start: 2018-11-02 | End: 2018-11-02

## 2018-11-02 RX ORDER — FACIAL-BODY WIPES
10 EACH TOPICAL DAILY PRN
Status: DISCONTINUED | OUTPATIENT
Start: 2018-11-02 | End: 2018-11-07 | Stop reason: HOSPADM

## 2018-11-02 RX ORDER — LORATADINE 10 MG/1
10 TABLET ORAL DAILY
Status: DISCONTINUED | OUTPATIENT
Start: 2018-11-02 | End: 2018-11-07 | Stop reason: HOSPADM

## 2018-11-02 RX ORDER — LANOLIN ALCOHOL/MO/W.PET/CERES
1 CREAM (GRAM) TOPICAL
Status: DISCONTINUED | OUTPATIENT
Start: 2018-11-03 | End: 2018-11-07 | Stop reason: HOSPADM

## 2018-11-02 RX ORDER — SODIUM CHLORIDE 0.9 % (FLUSH) 0.9 %
5-10 SYRINGE (ML) INJECTION AS NEEDED
Status: DISCONTINUED | OUTPATIENT
Start: 2018-11-02 | End: 2018-11-07 | Stop reason: HOSPADM

## 2018-11-02 RX ORDER — PANTOPRAZOLE SODIUM 40 MG/10ML
40 INJECTION, POWDER, LYOPHILIZED, FOR SOLUTION INTRAVENOUS EVERY 12 HOURS
Status: DISCONTINUED | OUTPATIENT
Start: 2018-11-02 | End: 2018-11-06

## 2018-11-02 RX ORDER — ONDANSETRON 2 MG/ML
4 INJECTION INTRAMUSCULAR; INTRAVENOUS
Status: DISCONTINUED | OUTPATIENT
Start: 2018-11-02 | End: 2018-11-07 | Stop reason: HOSPADM

## 2018-11-02 RX ORDER — ACETAMINOPHEN 650 MG/1
650 SUPPOSITORY RECTAL
COMMUNITY

## 2018-11-02 RX ORDER — FINASTERIDE 5 MG/1
5 TABLET, FILM COATED ORAL DAILY
Status: DISCONTINUED | OUTPATIENT
Start: 2018-11-02 | End: 2018-11-07 | Stop reason: HOSPADM

## 2018-11-02 RX ORDER — SODIUM CHLORIDE 0.9 % (FLUSH) 0.9 %
5-10 SYRINGE (ML) INJECTION EVERY 8 HOURS
Status: DISCONTINUED | OUTPATIENT
Start: 2018-11-02 | End: 2018-11-07 | Stop reason: HOSPADM

## 2018-11-02 RX ORDER — ALBUTEROL SULFATE 0.83 MG/ML
2.5 SOLUTION RESPIRATORY (INHALATION) AS NEEDED
Status: DISCONTINUED | OUTPATIENT
Start: 2018-11-02 | End: 2018-11-07 | Stop reason: HOSPADM

## 2018-11-02 RX ORDER — FUROSEMIDE 10 MG/ML
20 INJECTION INTRAMUSCULAR; INTRAVENOUS 2 TIMES DAILY
Status: DISCONTINUED | OUTPATIENT
Start: 2018-11-02 | End: 2018-11-03

## 2018-11-02 RX ORDER — PANTOPRAZOLE SODIUM 40 MG/10ML
40 INJECTION, POWDER, LYOPHILIZED, FOR SOLUTION INTRAVENOUS EVERY 12 HOURS
Status: DISCONTINUED | OUTPATIENT
Start: 2018-11-02 | End: 2018-11-02 | Stop reason: SDUPTHER

## 2018-11-02 RX ORDER — ACETAMINOPHEN 325 MG/1
650 TABLET ORAL
Status: DISCONTINUED | OUTPATIENT
Start: 2018-11-02 | End: 2018-11-07 | Stop reason: HOSPADM

## 2018-11-02 RX ORDER — LEVOTHYROXINE SODIUM 75 UG/1
75 TABLET ORAL
Status: DISCONTINUED | OUTPATIENT
Start: 2018-11-03 | End: 2018-11-07 | Stop reason: HOSPADM

## 2018-11-02 RX ORDER — DEXTROMETHORPHAN/PSEUDOEPHED 2.5-7.5/.8
1.2 DROPS ORAL
Status: DISCONTINUED | OUTPATIENT
Start: 2018-11-02 | End: 2018-11-05

## 2018-11-02 RX ORDER — FUROSEMIDE 10 MG/ML
INJECTION INTRAMUSCULAR; INTRAVENOUS
Status: COMPLETED
Start: 2018-11-02 | End: 2018-11-02

## 2018-11-02 RX ORDER — HYDRALAZINE HYDROCHLORIDE 20 MG/ML
10 INJECTION INTRAMUSCULAR; INTRAVENOUS
Status: DISCONTINUED | OUTPATIENT
Start: 2018-11-02 | End: 2018-11-07 | Stop reason: HOSPADM

## 2018-11-02 RX ORDER — THERA TABS 400 MCG
1 TAB ORAL DAILY
Status: DISCONTINUED | OUTPATIENT
Start: 2018-11-03 | End: 2018-11-07 | Stop reason: HOSPADM

## 2018-11-02 RX ADMIN — FUROSEMIDE 20 MG: 10 INJECTION, SOLUTION INTRAMUSCULAR; INTRAVENOUS at 19:50

## 2018-11-02 RX ADMIN — ACETAMINOPHEN 650 MG: 325 TABLET ORAL at 06:22

## 2018-11-02 RX ADMIN — Medication 10 ML: at 22:36

## 2018-11-02 RX ADMIN — Medication 10 ML: at 06:23

## 2018-11-02 RX ADMIN — Medication 10 ML: at 17:10

## 2018-11-02 RX ADMIN — FUROSEMIDE 40 MG: 10 INJECTION, SOLUTION INTRAMUSCULAR; INTRAVENOUS at 11:55

## 2018-11-02 RX ADMIN — SODIUM CHLORIDE 75 ML/HR: 900 INJECTION, SOLUTION INTRAVENOUS at 09:36

## 2018-11-02 RX ADMIN — PANTOPRAZOLE SODIUM 40 MG: 40 INJECTION, POWDER, FOR SOLUTION INTRAVENOUS at 11:06

## 2018-11-02 RX ADMIN — PANTOPRAZOLE SODIUM 40 MG: 40 INJECTION, POWDER, FOR SOLUTION INTRAVENOUS at 22:33

## 2018-11-02 NOTE — ED NOTES
Bedside and verbal report given to Patric Harden RN on Karthik Turpin at shift change for routine progression of care. Report consisted of patients Situation, Background, Assessment and Recommendations(SBAR). Information from the following report(s) SBAR, Kardex, ED Summary, [facility] and Recent Results was reviewed with the receiving nurse. Opportunity for questions and clarification was provided.

## 2018-11-02 NOTE — ED NOTES
TRANSFER - OUT REPORT: 
 
Verbal report given to Indira(name) on Lonzell March  being transferred to NSTU(unit) for routine progression of care Report consisted of patients Situation, Background, Assessment and  
Recommendations(SBAR). Information from the following report(s) SBAR, Kardex, ED Summary, Procedure Summary, Intake/Output, MAR and Recent Results was reviewed with the receiving nurse. Lines:  
Peripheral IV 11/02/18 Right Forearm (Active) Site Assessment Clean, dry, & intact 11/2/2018  5:02 AM  
Phlebitis Assessment 0 11/2/2018  5:02 AM  
Infiltration Assessment 0 11/2/2018  5:02 AM  
Dressing Status Clean, dry, & intact 11/2/2018  5:02 AM  
Dressing Type Tape;Transparent 11/2/2018  5:02 AM  
Hub Color/Line Status Pink 11/2/2018  5:02 AM  
   
Peripheral IV 11/02/18 Left Wrist (Active) Site Assessment Clean, dry, & intact 11/2/2018  5:02 AM  
Phlebitis Assessment 0 11/2/2018  5:02 AM  
Infiltration Assessment 0 11/2/2018  5:02 AM  
Dressing Status Clean, dry, & intact 11/2/2018  5:02 AM  
Dressing Type Tape;Transparent 11/2/2018  5:02 AM  
Hub Color/Line Status Pink 11/2/2018  5:02 AM  
  
 
Opportunity for questions and clarification was provided. Patient transported with: 
 O2 @ 3 liters

## 2018-11-02 NOTE — PROGRESS NOTES
Near syncope. Sent to [facility], anemia, GI bleed, orthostatic. Transferred to [facility].

## 2018-11-02 NOTE — ED NOTES
Observed pt to have lower O2 saturation (low 90s). Pt was sat up in bed and pt coughed. Pt O2 saturation back in high 90s.

## 2018-11-02 NOTE — ED NOTES
Assumed care of pt. Pt transferred from [facility] for progression of care with dx of rectal bleed. Pt was playing golf and walking without cane (normally uses cane, supposed to be using walker according to his daughter). Pt states he is not in any pain. He felt dizzy while playing golf earlier today. Pt reports that he takes iron normally for anemia. Pt with family at bedside, cardiac monitor x3, call bell within reach.

## 2018-11-02 NOTE — PROGRESS NOTES
TRANSFER - IN REPORT: 
 
Verbal report received from Lola Bolanos [facility] (name) on Lonzell March  being received from ER 25 (unit) for ordered procedure Report consisted of patients Situation, Background, Assessment and  
Recommendations(SBAR). Information from the following report(s) SBAR, ED Summary, Intake/Output, MAR and Recent Results was reviewed with the receiving nurse. Opportunity for questions and clarification was provided. Will give report to primary Endo nurse upon arrival to unit.

## 2018-11-02 NOTE — PROGRESS NOTES
Pharmacy Medication Reconciliation The patient was interviewed regarding current PTA medication list, use and drug allergies;  patient only was present in room and obtained permission from patient to discuss drug regimen with visitor(s) present. The patient was questioned regarding use of any other inhalers, topical products, over the counter medications, herbal medications, vitamin products or ophthalmic/nasal/otic medication use. Allergy Update: hydrocodone Recommendations/Findings: The following amendments were made to the patient's active medication [facility] [address] [facility]:  
1) Additions: none 2) Deletions:  
-fiber tabs 
-glycerin adult suppository 
-anusol HC 
-ocean spray 
-tramadol 3) Changes: none 
 
 
-Clarified PTA med list with medication list from [address]. PTA medication list was corrected to the following:  
 
Prior to Admission Medications Prescriptions Last Dose Informant Patient Reported? Taking? Ferrous Fumarate 325 mg (106 mg iron) tab 2018 at Unknown time  Yes Yes Sig: Take  by mouth two (2) times a day. OTHER   Yes No  
Sig: [employer] ([address] [place] [address]) May keep wine in room. OTHER  at . .. Yes Yes Sig: as needed. Natural Laxative 30cc po every day as needed Protein Supplement liqd   No No  
Sig: Take 1 CUP by mouth two (2) times a day. acetaminophen (TYLENOL) 325 mg tablet  at . Janace Bock Yes Yes Sig: Take 650 mg by mouth every four (4) hours as needed for Pain. Take two tabs po bid. acetaminophen (TYLENOL) 650 mg suppository  at . Janace Bock Yes Yes Sig: Insert 650 mg into rectum every four (4) hours as needed for Fever. albuterol (PROVENTIL VENTOLIN) 2.5 mg /3 mL (0.083 %) nebulizer solution   Yes No  
Si.5 mg by Nebulization route as needed for Wheezing. Every 6 hrs prn SOB. alum-mag hydroxide-simeth (MAALOX ADVANCED) 200-200-20 mg/5 mL susp   Yes No  
 Sig: Take 30 mL by mouth every four (4) hours as needed. atorvastatin (LIPITOR) 80 mg tablet 2018 at Unknown time  No Yes Sig: Take 1 Tab by mouth daily. bisacodyl (DULCOLAX, BISACODYL,) 10 mg suppository  at . Parag Inch Yes Yes Sig: Insert 10 mg into rectum as needed. cyanocobalamin (VITAMIN B12) 1,000 mcg/mL injection 10/26/2018 at Unknown time  Yes Yes Si,000 mcg by IntraMUSCular route. Every 3 weeks  
escitalopram oxalate (LEXAPRO) 5 mg tablet 2018 at Unknown time  No Yes Sig: Take 1 Tab by mouth daily. famotidine (PEPCID) 20 mg tablet 2018 at 0900  No Yes Sig: Take 1 Tab by mouth two (2) times a day. Patient taking differently: Take 20 mg by mouth daily. finasteride (PROSCAR) 5 mg tablet 2018 at Unknown time  No Yes Sig: Take 1 Tab by mouth daily. Indications: SYMPTOMATIC BENIGN PROSTATIC HYPERPLASIA  
levothyroxine (SYNTHROID) 75 mcg tablet 2018 at Unknown time  Yes Yes Sig: Take  by mouth Daily (before breakfast). loratadine (CLARITIN) 10 mg tablet 2018 at Unknown time  No Yes Sig: Take 1 Tab by mouth daily. Indications: ALLERGIC RHINITIS  
magnesium hydroxide (CLEMENS MILK OF MAGNESIA) 400 mg/5 mL suspension  at . .. Yes Yes Sig: Take 30 mL by mouth daily as needed for Constipation. mineral oil (FLEET) enema  at . .. Yes Yes Sig: Insert  into rectum as needed for Constipation (daily as needed). multivitamin (ONE A DAY) tablet 2018 at Unknown time  Yes Yes Sig: Take 1 Tab by mouth daily. neomycin-bacitracin-polymyxin (TRIPLE ANTIBIOTIC) 3.5mg-400 unit- 5,000 unit/gram ointment  at . .. Yes Yes Sig: Apply 1 Each to affected area as needed (to wound tear/abrasion). nitroglycerin (NITROSTAT) 0.4 mg SL tablet  at . .. Yes Yes Sig: by SubLINGual route every five (5) minutes as needed for Chest Pain (not to exceed 3 doses). rivaroxaban (XARELTO) 15 mg tab tablet 2018 at Unknown time  Yes Yes Sig: Take 15 mg by mouth daily. Facility-Administered Medications: None Thank you, Grace Olvera [place]

## 2018-11-02 NOTE — PROGRESS NOTES
Hospitalist Progress Note NAME: Rogelio De Leon :  1923 MRN:  [id number] Assessment / Plan: 
Acute hypoxic respiratory failure 
-clinically vol overload 
-repeat CXR showed pulmonary edema 
-stop IVF 
-IV lasix 40mg x1 
-short term lasix while inpt 
-O2 suppl, wean as tolerated Acute blood loss anemia secondary to GI bleed -IVF discontinued due to pulmonary edema -IV PPI 
-transfuse prn, trend H/H 
-NPO for EGD 
-appreciate GI's consultation Hyperlipidemia Continue Lipitor 80 mg daily Hypothyroidism Continue levothyroxine 75 mcg daily Atrial fibrillation/CAD 
-Hold Xarelto because of GI bleed Depression 
-Continue home medication Lexapro 5 mg daily 
  
Mild dementia 
-daughter reported mild confusion at baseline 
-avoid sedating meds 
-sitter prn 
  
Code Status: full code Surrogate Decision Maker: daughter   
DVT Prophylaxis: SCDs GI Prophylaxis: not indicated  
Baseline: from NH facility Subjective: Chief Complaint / Reason for Physician Visit Pt seen at bedside. Hypoxic , required O2. Has crackles on lung exam which is new from admission. Discussed with RN events overnight. Review of Systems: 
Symptom Y/N Comments  Symptom Y/N Comments Fever/Chills n   Chest Pain n   
Poor Appetite    Edema Cough    Abdominal Pain n   
Sputum    Joint Pain SOB/YOUNG y   Pruritis/Rash Nausea/vomit    Tolerating PT/OT Diarrhea    Tolerating Diet Constipation    Other Could NOT obtain due to:   
 
Objective: VITALS:  
Last 24hrs VS reviewed since prior progress note. Most recent are: 
Patient Vitals for the past 24 hrs: 
 Temp Pulse Resp BP SpO2  
18 1155  61  161/81   
18 0945  64 26 160/82 95 % 18 0900  62 23 147/55 96 % 18 0730  61 24 165/65 94 % 18 0720     (!) 85 % 18 0645  61 28 169/60 (!) 86 % 18 0602  61 20  92 % 18 0600  61 17 137/61  11/02/18 0532  65 19  92 % 11/02/18 0515  60 27 144/74   
11/02/18 0430 97.6 °F (36.4 °C) 60 22 136/77 93 % 11/02/18 0345  60 27 173/57 90 % 11/02/18 0335  83 22 164/67 91 % 11/02/18 0300  67 23 143/69 94 % 11/02/18 0115  64 13 124/73 90 % 11/02/18 0100  61 16 166/61 92 % 11/02/18 0045  87 20  98 % 11/02/18 0030  61 18 142/89 94 % 11/02/18 0015  60 24 151/70 92 % 11/02/18 0000  64 27  93 % 11/01/18 2345  78 23 146/75 (!) 88 % 11/01/18 2330  62 24 136/71 96 % 11/01/18 2327  64 18  97 % 11/01/18 2324 97.9 °F (36.6 °C) 62 23 159/64 95 % 11/01/18 2315  60 25 159/64 93 % 11/01/18 2300  65 22 142/73   
11/01/18 2245  64 18 155/69   
11/01/18 2230  60 20 150/83 95 % 11/01/18 2200  69 28 148/87 95 % 11/01/18 2152 98.1 °F (36.7 °C) 73 20 149/71 96 % Intake/Output Summary (Last 24 hours) at 11/2/2018 1401 Last data filed at 11/2/2018 1320 Gross per 24 hour Intake  Output 950 ml Net -950 ml PHYSICAL EXAM: 
General: WD, WN. Alert, cooperative, no acute distress   
EENT:  EOMI. Anicteric sclerae. MMM Resp:  Crackles, no wheezing. No accessory muscle use CV:  Regular  rhythm,  No edema GI:  Soft, Non distended, Non tender.  +BS Neurologic:  Alert and oriented X 2, normal speech Psych:   Not anxious nor agitated Skin:  No rashes. No jaundice Reviewed most current lab test results and cultures  YES Reviewed most current radiology test results   YES Review and summation of old records today    NO Reviewed patient's current orders and MAR    YES 
PMH/SH reviewed - no change compared to H&P 
________________________________________________________________________ Care Plan discussed with: 
  Comments Patient x Family  x daughter RN x Care Manager Consultant Multidiciplinary team rounds were held today with , nursing, pharmacist and clinical coordinator.   Patient's plan of care was discussed; medications were reviewed and discharge planning was addressed. ________________________________________________________________________ Total NON critical care TIME:  35   Minutes Total CRITICAL CARE TIME Spent:   Minutes non procedure based Comments >50% of visit spent in counseling and coordination of care    
________________________________________________________________________ Adriana Blancas MD  
 
Procedures: see electronic medical records for all procedures/Xrays and details which were not copied into this note but were reviewed prior to creation of Plan. LABS: 
I reviewed today's most current labs and imaging studies. Pertinent labs include: 
Recent Labs 11/02/18 
0454 11/01/18 
2213 11/01/18 
1937 11/01/18 
1725 WBC 7.2 5.5  --  5.6 HGB 7.4* 7.4* 7.5* 7.8* HCT 22.6* 23.5* 23.3* 23.9*  
* 133*  --  136* Recent Labs 11/02/18 
0454 11/01/18 
1725 * 143  
K 3.9 4.0  
* 106 CO2 23 25 GLU 87 102* BUN 19 23* CREA 1.14 1.40* CA 7.5* 8.7 ALB  --  3.4* TBILI  --  0.3 SGOT  --  29 ALT  --  20 INR 1.3* 1.3* Signed: Adriana Blancas MD

## 2018-11-02 NOTE — CONSULTS
[facility].  [address] [facility] 140 [facility], [address]  [phone]                     GI CONSULTATION NOTE      NAME:  Robert Navarrete   :   1923   MRN:   [id number]       Referring Physician: Dr Estelle Amin    Consult Date: 2018     Chief Complaint: Melena    History of Present Illness:  Patient is a 80 y. o. who is seen in consultation at the request of Dr. Estelle Amin for melena. Mr Madhuri Ramsey has been transferred from [facility] with GI bleeding. He was playing golf and felt weak and dizzy. He went to hospital. He also had a large dark BM. He has been having dark stools off and on for 2 weeks now. Denies abdominal pain, nausea or vomiting or bright red blood per rectum. He has been on Xarelto for CAD. His Hgb dropped from 10's to 7.4. No further BM since 11 AM. He has not had a colonoscopy      PMH:  Past Medical History:   Diagnosis Date    Anemia     Anxiety     Arrhythmia     Afib    Chronic kidney disease     Constipation     Depression     Hypercholesterolemia     Hypertension     NSTEMI (non-ST elevated myocardial infarction) ([facility] [address] 2016    [facility]:  JACKELIN to SVG-L-PLB and JACKELIN to SVG-LADstents     Pacemaker     Followed By Dr Mark Pimentel S/P CABG x 4     Thyroid disease     hypothyroidism       PSH:  Past Surgical History:   Procedure Laterality Date    CARDIAC SURG PROCEDURE UNLIST      pacer    CARDIAC SURG PROCEDURE UNLIST  1995    quadruple bypass     HX CORONARY ARTERY BYPASS GRAFT      4V    HX PACEMAKER  2003       Allergies: Allergies   Allergen Reactions    Hydrocodone Nausea Only       Home Medications:  Prior to Admission Medications   Prescriptions Last Dose Informant Patient Reported? Taking? Ferrous Fumarate 325 mg (106 mg iron) tab   Yes No   Sig: Take  by mouth two (2) times a day. OTHER   Yes No   Sig: [facility] ([facility]) May keep wine in room.    OTHER   Yes No   Sig: Natural Laxative 30cc po every day   Protein Supplement liqd   No No   Sig: Take 1 CUP by mouth two (2) times a day. acetaminophen (TYLENOL) 325 mg tablet   Yes No   Sig: Take 650 mg by mouth every four (4) hours as needed for Pain. Take two tabs po bid. acetaminophen (TYLENOL) 80 mg suppository   Yes No   Sig: Insert 650 mg into rectum every four (4) hours as needed for Fever. albuterol (PROVENTIL VENTOLIN) 2.5 mg /3 mL (0.083 %) nebulizer solution   Yes No   Si.5 mg by Nebulization route as needed for Wheezing. Every 6 hrs prn SOB. alum-mag hydroxide-simeth (MAALOX ADVANCED) 200-200-20 mg/5 mL susp   Yes No   Sig: Take 30 mL by mouth every four (4) hours as needed. atorvastatin (LIPITOR) 80 mg tablet   No No   Sig: Take 1 Tab by mouth daily. bisacodyl (DULCOLAX, BISACODYL,) 10 mg suppository   Yes No   Sig: Insert 10 mg into rectum as needed. calcium polycarbophil (FIBER-TABS) 625 mg tablet   No No   Sig: Take 1 Tab by mouth two (2) times a day. Indications: DIARRHEA   cyanocobalamin (VITAMIN B12) 1,000 mcg/mL injection   Yes No   Si,000 mcg by IntraMUSCular route every month. Every 3 weeks   escitalopram oxalate (LEXAPRO) 5 mg tablet   No No   Sig: Take 1 Tab by mouth daily. famotidine (PEPCID) 20 mg tablet   No No   Sig: Take 1 Tab by mouth two (2) times a day. Patient taking differently: Take 20 mg by mouth daily. finasteride (PROSCAR) 5 mg tablet   No No   Sig: Take 1 Tab by mouth daily. Indications: SYMPTOMATIC BENIGN PROSTATIC HYPERPLASIA   glycerin, adult, (SUPPOSITORY ADULT) suppository   Yes No   Sig: Insert 1 Suppository into rectum as needed. hydrocortisone (ANUSOL-HC) 2.5 % rectal cream   Yes No   Sig: Insert  into rectum as needed for Hemorrhoids. levothyroxine (SYNTHROID) 75 mcg tablet   Yes No   Sig: Take  by mouth Daily (before breakfast). loratadine (CLARITIN) 10 mg tablet   No No   Sig: Take 1 Tab by mouth daily.  Indications: ALLERGIC RHINITIS   magnesium hydroxide (CLEMENS MILK OF MAGNESIA) 400 mg/5 mL suspension   Yes No   Sig: Take 30 mL by mouth daily as needed for Constipation. mineral oil (FLEET) enema   Yes No   Sig: Insert  into rectum as needed for Constipation (daily as needed). multivitamin (ONE A DAY) tablet   Yes No   Sig: Take 1 Tab by mouth daily. neomycin-bacitracin-polymyxin (TRIPLE ANTIBIOTIC) 3.5mg-400 unit- 5,000 unit/gram ointment   Yes No   Sig: Apply  to affected area as needed. nitroglycerin (NITROSTAT) 0.4 mg SL tablet   Yes No   Sig: by SubLINGual route every five (5) minutes as needed for Chest Pain (not to exceed 3 doses). rivaroxaban (XARELTO) 15 mg tab tablet   Yes No   Sig: Take  by mouth daily. sodium chloride (OCEAN) 0.65 % nasal spray   Yes No   Si Sprays by Both Nostrils route four (4) times daily. traMADol (ULTRAM) 50 mg tablet   Yes No   Sig: Take 50 mg by mouth every eight (8) hours as needed for Pain. Facility-Administered Medications: None       Hospital Medications:  Current Facility-Administered Medications   Medication Dose Route Frequency    0.9% sodium chloride infusion  125 mL/hr IntraVENous CONTINUOUS     Current Outpatient Medications   Medication Sig    bisacodyl (DULCOLAX, BISACODYL,) 10 mg suppository Insert 10 mg into rectum as needed.  rivaroxaban (XARELTO) 15 mg tab tablet Take  by mouth daily.  neomycin-bacitracin-polymyxin (TRIPLE ANTIBIOTIC) 3.5mg-400 unit- 5,000 unit/gram ointment Apply  to affected area as needed.  Ferrous Fumarate 325 mg (106 mg iron) tab Take  by mouth two (2) times a day.  nitroglycerin (NITROSTAT) 0.4 mg SL tablet by SubLINGual route every five (5) minutes as needed for Chest Pain (not to exceed 3 doses).  escitalopram oxalate (LEXAPRO) 5 mg tablet Take 1 Tab by mouth daily.  loratadine (CLARITIN) 10 mg tablet Take 1 Tab by mouth daily. Indications: ALLERGIC RHINITIS    atorvastatin (LIPITOR) 80 mg tablet Take 1 Tab by mouth daily.     famotidine (PEPCID) 20 mg tablet Take 1 Tab by mouth two (2) times a day. (Patient taking differently: Take 20 mg by mouth daily.)    albuterol (PROVENTIL VENTOLIN) 2.5 mg /3 mL (0.083 %) nebulizer solution 2.5 mg by Nebulization route as needed for Wheezing. Every 6 hrs prn SOB.  hydrocortisone (ANUSOL-HC) 2.5 % rectal cream Insert  into rectum as needed for Hemorrhoids.  traMADol (ULTRAM) 50 mg tablet Take 50 mg by mouth every eight (8) hours as needed for Pain.  sodium chloride (OCEAN) 0.65 % nasal spray 2 Sprays by Both Nostrils route four (4) times daily.  calcium polycarbophil (FIBER-TABS) 625 mg tablet Take 1 Tab by mouth two (2) times a day. Indications: DIARRHEA    finasteride (PROSCAR) 5 mg tablet Take 1 Tab by mouth daily. Indications: SYMPTOMATIC BENIGN PROSTATIC HYPERPLASIA    Protein Supplement liqd Take 1 CUP by mouth two (2) times a day.  cyanocobalamin (VITAMIN B12) 1,000 mcg/mL injection 1,000 mcg by IntraMUSCular route every month. Every 3 weeks    mineral oil (FLEET) enema Insert  into rectum as needed for Constipation (daily as needed).  glycerin, adult, (SUPPOSITORY ADULT) suppository Insert 1 Suppository into rectum as needed.  OTHER [employer] ([address] [place] [address]) May keep wine in room.  multivitamin (ONE A DAY) tablet Take 1 Tab by mouth daily.  acetaminophen (TYLENOL) 325 mg tablet Take 650 mg by mouth every four (4) hours as needed for Pain. Take two tabs po bid.  levothyroxine (SYNTHROID) 75 mcg tablet Take  by mouth Daily (before breakfast).  acetaminophen (TYLENOL) 80 mg suppository Insert 650 mg into rectum every four (4) hours as needed for Fever.  OTHER Natural Laxative 30cc po every day    magnesium hydroxide (CLEMENS MILK OF MAGNESIA) 400 mg/5 mL suspension Take 30 mL by mouth daily as needed for Constipation.  alum-mag hydroxide-simeth (MAALOX ADVANCED) 200-200-20 mg/5 mL susp Take 30 mL by mouth every four (4) hours as needed.        Social History:  Social History     Tobacco Use    Smoking status: Former Smoker    Smokeless tobacco: Never Used   Substance Use Topics    Alcohol use: Yes     Alcohol/week: 4.2 oz     Types: 7 Glasses of wine per week       Family History:  Family History   Problem Relation Age of Onset    Glaucoma Mother     Heart Disease Father     Heart Disease Sister        Review of Systems:    Constitutional: negative fever, negative chills, negative weight loss  Eyes:   negative visual changes  ENT:   negative sore throat, tongue or lip swelling  Respiratory:  negative cough, negative dyspnea  Cards:  negative for chest pain, palpitations, lower extremity edema  GI:   See HPI  :  negative for frequency, dysuria  Integument:  negative for rash and pruritus  Heme:  negative for easy bruising and gum/nose bleeding  Musculoskel: negative for myalgias,  back pain and muscle weakness  Neuro: negative for headaches, dizziness, vertigo  Psych:  negative for feelings of anxiety, depression      Objective:     Patient Vitals for the past 8 hrs:   BP Temp Pulse Resp SpO2 Height Weight   11/01/18 2315 159/64  60 25 93 %     11/01/18 2300 142/73  65 22      11/01/18 2245 155/69  64 18      11/01/18 2230 150/83  60 20 95 %     11/01/18 2200 148/87  69 28 95 %     11/01/18 2152 149/71 98.1 °F (36.7 °C) 73 20 96 % 5' 11\" (1.803 m) 68.5 kg (151 lb 0.2 oz)     No intake/output data recorded. No intake/output data recorded. PHYSICAL EXAM:  General: Pale, WD, WN. Alert, cooperative, no acute distress    HEENT: NC, Atraumatic. PERRLA, EOMI. Anicteric sclerae. Lungs:  CTA Bilaterally. No Wheezing/Rhonchi/Rales. Heart:  Regular  rhythm,  No murmur (), No Rubs, No Gallops  Abdomen: Soft, Non distended, Non tender.  +Bowel sounds, no HSM  Extremities: No c/c/e  Neurologic:  CN 2-12 gi, Alert and oriented X 3. No acute neurological distress   Psych:   Good insight. Not anxious nor agitated.     Data Review     Recent Labs     11/01/18  2213 11/01/18  1937 11/01/18  1725 WBC 5.5  --  5.6   HGB 7.4* 7.5* 7.8*   HCT 23.5* 23.3* 23.9*   *  --  136*     Recent Labs     11/01/18  1725      K 4.0      CO2 25   BUN 23*   CREA 1.40*   *   CA 8.7     Recent Labs     11/01/18  1725   SGOT 29   AP 98   TP 6.4   ALB 3.4*   GLOB 3.0     Recent Labs     11/01/18  1725   INR 1.3*   PTP 12.5*        Imaging studies reviewed          Assessment:   Patient Active Problem List   Diagnosis Code    Dementia F03.90    Atrial fibrillation (HCC) I48.91    NSTEMI (non-ST elevated [facility] infarction) ([facility]) I21.4    S/P CABG x 4 Z95.1    Pacemaker Z95.0    Iron deficiency anemia D50.9    Anemia in stage 3 chronic kidney disease (HCC) N18.3, D63.1                      Plan:   GI bleeding- differential includes PUD, GI erosions. AVM, and GI malignancy  He is hemodynamically stable.    Would hold Xarelto, start PPI and keep NPO  EGD tomorrow  Monitor H&H and transfuse as necessary  Thanks for the kind referral

## 2018-11-02 NOTE — PROGRESS NOTES
Per Dr. Ruchi Meraz, patient needs to be diuresed prior to sedation for an Endoscopy procedure. Currently receiving lasix. Dr. Rubio Smith aware.

## 2018-11-02 NOTE — H&P
Hospitalist Admission NoteNAME: Veronica Nuñez :  1923 MRN:  [id number] Date/Time:  2018 8:23 AM 
 
Patient PCP: Sandro Bello NP 
______________________________________________________________________ Given the patient's current clinical presentation, I have a high level of concern for decompensation if discharged from the emergency department. Complex decision making was performed, which includes reviewing the patient's available past medical records, laboratory results, and x-ray films. My assessment of this patient's clinical condition and my plan of care is as follows. Assessment / Plan: 
 
Acute blood loss anemia secondary to GI bleed Admit patient to telemetry unit-year-oldcheck hemoglobin every 6 hours Transfuse as needed Betiwtito consultation aware Keep patient n.p.o. Start patient on IV fluid Start patient on Protonix 40 mg IV every 12 Hyperlipidemia Continue Lipitor 80 mg daily Hypothyroidism Continue levothyroxine 75 mcg daily Atrial fibrillation Hold Xarelto because of GI bleed Depression Continue home medication Lexapro 5 mg daily Code Status: full code Surrogate Decision Maker:daughter DVT Prophylaxis: SCD 
GI Prophylaxis: not indicated Baseline: independent Subjective: CHIEF COMPLAINT: syncope , dark color stool HISTORY OF PRESENT ILLNESS:    
80years old male with past medical history significant for hypertension, atrial fibrillation, depression, high cholesterol, anemia, chronic kidney disease, anxiety, hypothyroidism was transferred from [facility] ED because of GI bleed, according to the patient daughter she noticed intermittent weakness during the day associated with the 2 near-syncope episode, patient also noticed some stool color change past few days patient denies any abdominal pain any chest pain any shortness of breath, hemoglobin was checked out [facility] and was found to be 7.8 dropped from 10.6, Hemoccult of stool was checked which came back positive. We were asked to admit for work up and evaluation of the above problems. Past Medical History:  
Diagnosis Date  Anemia  Anxiety  Arrhythmia Afib  Chronic kidney disease  Constipation  Depression  Hypercholesterolemia  Hypertension  NSTEMI (non-ST elevated myocardial infarction) ([address] 11/21/2016 [facility]:  JACKELIN to SVG-L-PLB and JACKELIN to SVG-LADstents 11/16 24 [facility] Pacemaker 2009 Followed By Dr Ivy Lloyd  S/P CABG x 4 1995  Thyroid disease   
 hypothyroidism Past Surgical History:  
Procedure Laterality Date  CARDIAC SURG PROCEDURE UNLIST    
 pacer  CARDIAC SURG PROCEDURE UNLIST  07/20/1995  
 quadruple bypass [address]  
 4V  HX PACEMAKER  04/23/2003 Social History Tobacco Use  Smoking status: Former Smoker  Smokeless tobacco: Never Used Substance Use Topics  Alcohol use: Yes Alcohol/week: 4.2 oz Types: 7 Glasses of wine per week Family History Problem Relation Age of Onset  Glaucoma Mother  Heart Disease Father  Heart Disease Sister Allergies Allergen Reactions  Hydrocodone Nausea Only Prior to Admission medications Medication Sig Start Date End Date Taking? Authorizing Provider  
bisacodyl (DULCOLAX, BISACODYL,) 10 mg suppository Insert 10 mg into rectum as needed. Provider, Historical  
rivaroxaban (XARELTO) 15 mg tab tablet Take  by mouth daily. Provider, Historical  
neomycin-bacitracin-polymyxin (TRIPLE ANTIBIOTIC) 3.5mg-400 unit- 5,000 unit/gram ointment Apply  to affected area as needed. Provider, Historical  
Ferrous Fumarate 325 mg (106 mg iron) tab Take  by mouth two (2) times a day.     Provider, Historical  
nitroglycerin (NITROSTAT) 0.4 mg SL tablet by SubLINGual route every five (5) minutes as needed for Chest Pain (not to exceed 3 doses). Provider, Historical  
escitalopram oxalate (LEXAPRO) 5 mg tablet Take 1 Tab by mouth daily. 3/1/17   Darleen Zarate NP  
loratadine (CLARITIN) 10 mg tablet Take 1 Tab by mouth daily. Indications: ALLERGIC RHINITIS 3/1/17   Darleen Zarate NP  
atorvastatin (LIPITOR) 80 mg tablet Take 1 Tab by mouth daily. 11/29/16   Sylvester Rodriguez MD  
famotidine (PEPCID) 20 mg tablet Take 1 Tab by mouth two (2) times a day. Patient taking differently: Take 20 mg by mouth daily. 11/29/16   Sylvester Rodriguez MD  
albuterol (PROVENTIL VENTOLIN) 2.5 mg /3 mL (0.083 %) nebulizer solution 2.5 mg by Nebulization route as needed for Wheezing. Every 6 hrs prn SOB. Provider, Historical  
hydrocortisone (ANUSOL-HC) 2.5 % rectal cream Insert  into rectum as needed for Hemorrhoids. Provider, Historical  
traMADol (ULTRAM) 50 mg tablet Take 50 mg by mouth every eight (8) hours as needed for Pain. Provider, Historical  
sodium chloride (OCEAN) 0.65 % nasal spray 2 Sprays by Both Nostrils route four (4) times daily. Provider, Historical  
calcium polycarbophil (FIBER-TABS) 625 mg tablet Take 1 Tab by mouth two (2) times a day. Indications: DIARRHEA 4/4/16   Darleen Zarate NP  
finasteride (PROSCAR) 5 mg tablet Take 1 Tab by mouth daily. Indications: SYMPTOMATIC BENIGN PROSTATIC HYPERPLASIA 2/22/16   Darleen Zarate NP Protein Supplement liqd Take 1 CUP by mouth two (2) times a day. 1/13/16   Darleen Zarate NP  
cyanocobalamin (VITAMIN B12) 1,000 mcg/mL injection 1,000 mcg by IntraMUSCular route every month. Every 3 weeks    Provider, Historical  
mineral oil (FLEET) enema Insert  into rectum as needed for Constipation (daily as needed). Provider, Historical  
glycerin, adult, (SUPPOSITORY ADULT) suppository Insert 1 Suppository into rectum as needed.     Provider, Historical  
 OTHER [employer] ([address] [place] [address]) May keep wine in room. Provider, Historical  
multivitamin (ONE A DAY) tablet Take 1 Tab by mouth daily. Provider, Historical  
acetaminophen (TYLENOL) 325 mg tablet Take 650 mg by mouth every four (4) hours as needed for Pain. Take two tabs po bid. Provider, Historical  
levothyroxine (SYNTHROID) 75 mcg tablet Take  by mouth Daily (before breakfast). Provider, Historical  
acetaminophen (TYLENOL) 80 mg suppository Insert 650 mg into rectum every four (4) hours as needed for Fever. Provider, Historical  
OTHER Natural Laxative 30cc po every day    Provider, Historical  
magnesium hydroxide (CLEMENS MILK OF MAGNESIA) 400 mg/5 mL suspension Take 30 mL by mouth daily as needed for Constipation. Provider, Historical  
alum-mag hydroxide-simeth (MAALOX ADVANCED) 200-200-20 mg/5 mL susp Take 30 mL by mouth every four (4) hours as needed. Provider, Historical  
 
 
REVIEW OF SYSTEMS:    
I am not able to complete the review of systems because: The patient is intubated and sedated The patient has altered mental status due to his acute medical problems The patient has baseline aphasia from prior stroke(s) The patient has baseline dementia and is not reliable historian The patient is in acute medical distress and unable to provide information Total of 12 systems reviewed as follows:   
   POSITIVE= underlined text  Negative = text not underlined General:  fever, chills, sweats, generalized weakness, weight loss/gain,  
   loss of appetite Eyes:    blurred vision, eye pain, loss of vision, double vision ENT:    rhinorrhea, pharyngitis Respiratory:   cough, sputum production, SOB, YOUNG, wheezing, pleuritic pain  
Cardiology:   chest pain, palpitations, orthopnea, PND, edema, syncope Gastrointestinal:  abdominal pain , N/V, diarrhea, dysphagia, constipation, bleeding Genitourinary:  frequency, urgency, dysuria, hematuria, incontinence Muskuloskeletal :  arthralgia, myalgia, back pain Hematology:  easy bruising, nose or gum bleeding, lymphadenopathy Dermatological: rash, ulceration, pruritis, color change / jaundice Endocrine:   hot flashes or polydipsia Neurological:  headache, dizziness, confusion, focal weakness, paresthesia, Speech difficulties, memory loss, gait difficulty Psychological: Feelings of anxiety, depression, agitation Objective: VITALS:   
Visit Vitals /65 Pulse 61 Temp 97.6 °F (36.4 °C) Resp 24 Ht 5' 11\" (1.803 m) Wt 68.5 kg (151 lb 0.2 oz) SpO2 94% BMI 21.06 kg/m² PHYSICAL EXAM: 
 
 
_______________________________________________________________________ Care Plan discussed with: 
  Comments Patient y Family  yy   
RN Care Manager Consultant:     
_______________________________________________________________________ Expected  Disposition:  
Home with Family y HH/PT/OT/RN   
SNF/LTC   
BLANE   
________________________________________________________________________ TOTAL TIME:  70 Minutes Critical Care Provided     Minutes non procedure based Comments  
 y Reviewed previous records  
>50% of visit spent in counseling and coordination of care y Discussion with patient and/or family and questions answered 
  
 
________________________________________________________________________ Signed: Peggy Mcpherson MD 
 
Procedures: see electronic medical records for all procedures/Xrays and details which were not copied into this note but were reviewed prior to creation of Plan. LAB DATA REVIEWED:   
Recent Results (from the past 24 hour(s)) OCCULT BLOOD, STOOL Collection Time: 11/01/18  5:08 PM  
Result Value Ref Range Occult blood, stool POSITIVE (A) NEG    
URINALYSIS W/ RFLX MICROSCOPIC Collection Time: 11/01/18  5:10 PM  
Result Value Ref Range Color YELLOW/STRAW Appearance CLEAR CLEAR Specific gravity 1.015 1.003 - 1.030    
 pH (UA) 6.5 5.0 - 8.0 Protein NEGATIVE  NEG mg/dL Glucose NEGATIVE  NEG mg/dL Ketone NEGATIVE  NEG mg/dL Bilirubin NEGATIVE  NEG Blood NEGATIVE  NEG Urobilinogen 0.2 0.2 - 1.0 EU/dL Nitrites NEGATIVE  NEG Leukocyte Esterase NEGATIVE  NEG    
CBC WITH AUTOMATED DIFF Collection Time: 11/01/18  5:25 PM  
Result Value Ref Range WBC 5.6 4.1 - 11.1 K/uL  
 RBC 2.45 (L) 4.10 - 5.70 M/uL HGB 7.8 (L) 12.1 - 17.0 g/dL HCT 23.9 (L) 36.6 - 50.3 % MCV 97.6 80.0 - 99.0 FL  
 MCH 31.8 26.0 - 34.0 PG  
 MCHC 32.6 30.0 - 36.5 g/dL  
 RDW 15.0 (H) 11.5 - 14.5 % PLATELET 373 (L) 312 - 400 K/uL MPV 9.9 8.9 - 12.9 FL  
 NRBC 0.0 0  WBC ABSOLUTE NRBC 0.00 0.00 - 0.01 K/uL NEUTROPHILS 56 32 - 75 % LYMPHOCYTES 27 12 - 49 % MONOCYTES 8 5 - 13 % EOSINOPHILS 9 (H) 0 - 7 % BASOPHILS 0 0 - 1 % IMMATURE GRANULOCYTES 0 0.0 - 0.5 % ABS. NEUTROPHILS 3.1 1.8 - 8.0 K/UL  
 ABS. LYMPHOCYTES 1.5 0.8 - 3.5 K/UL  
 ABS. MONOCYTES 0.4 0.0 - 1.0 K/UL  
 ABS. EOSINOPHILS 0.5 (H) 0.0 - 0.4 K/UL ABS. BASOPHILS 0.0 0.0 - 0.1 K/UL  
 ABS. IMM. GRANS. 0.0 0.00 - 0.04 K/UL  
 DF AUTOMATED PROTHROMBIN TIME + INR Collection Time: 11/01/18  5:25 PM  
Result Value Ref Range INR 1.3 (H) 0.9 - 1.1 Prothrombin time 12.5 (H) 9.0 - 11.1 sec METABOLIC PANEL, COMPREHENSIVE Collection Time: 11/01/18  5:25 PM  
Result Value Ref Range Sodium 143 136 - 145 mmol/L Potassium 4.0 3.5 - 5.1 mmol/L Chloride 106 97 - 108 mmol/L  
 CO2 25 21 - 32 mmol/L Anion gap 12 5 - 15 mmol/L Glucose 102 (H) 65 - 100 mg/dL BUN 23 (H) 6 - 20 MG/DL Creatinine 1.40 (H) 0.70 - 1.30 MG/DL  
 BUN/Creatinine ratio 16 12 - 20 GFR est AA 57 (L) >60 ml/min/1.73m2 GFR est non-AA 47 (L) >60 ml/min/1.73m2 Calcium 8.7 8.5 - 10.1 MG/DL Bilirubin, total 0.3 0.2 - 1.0 MG/DL  
 ALT (SGPT) 20 12 - 78 U/L  
 AST (SGOT) 29 15 - 37 U/L Alk. phosphatase 98 45 - 117 U/L Protein, total 6.4 6.4 - 8.2 g/dL Albumin 3.4 (L) 3.5 - 5.0 g/dL Globulin 3.0 2.0 - 4.0 g/dL A-G Ratio 1.1 1.1 - 2.2 LACTIC ACID Collection Time: 11/01/18  5:25 PM  
Result Value Ref Range Lactic acid 1.6 0.4 - 2.0 MMOL/L  
TROPONIN I Collection Time: 11/01/18  5:25 PM  
Result Value Ref Range Troponin-I, Qt. <0.05 <0.05 ng/mL TYPE + CROSSMATCH Collection Time: 11/01/18  6:30 PM  
Result Value Ref Range Crossmatch Expiration 11/04/2018 ABO/Rh(D) A NEGATIVE Antibody screen NEG Unit number [id number] Blood component type RC LRIR Unit division 00 Status of unit ALLOCATED Crossmatch result Compatible Unit number [id number] Blood component type RC LRIR Unit division 00 Status of unit ALLOCATED Crossmatch result Compatible EKG, 12 LEAD, INITIAL Collection Time: 11/01/18  6:57 PM  
Result Value Ref Range Ventricular Rate 61 BPM  
 Atrial Rate 66 BPM  
 QRS Duration 166 ms  
 Q-T Interval 528 ms QTC Calculation (Bezet) 531 ms Calculated R Axis -47 degrees Calculated T Axis 104 degrees Diagnosis Ventricular-paced rhythm Abnormal ECG No previous ECGs available Confirmed by Brent Winston MD, --- ([id number]) on 11/1/2018 8:00:25 PM 
  
HGB & HCT Collection Time: 11/01/18  7:37 PM  
Result Value Ref Range HGB 7.5 (L) 12.1 - 17.0 g/dL HCT 23.3 (L) 36.6 - 50.3 % CBC WITH AUTOMATED DIFF Collection Time: 11/01/18 10:13 PM  
Result Value Ref Range WBC 5.5 4.1 - 11.1 K/uL  
 RBC 2.36 (L) 4.10 - 5.70 M/uL HGB 7.4 (L) 12.1 - 17.0 g/dL HCT 23.5 (L) 36.6 - 50.3 % MCV 99.6 (H) 80.0 - 99.0 FL  
 MCH 31.4 26.0 - 34.0 PG  
 MCHC 31.5 30.0 - 36.5 g/dL  
 RDW 15.4 (H) 11.5 - 14.5 % PLATELET 243 (L) 119 - 400 K/uL MPV 10.1 8.9 - 12.9 FL  
 NRBC 0.0 0  WBC ABSOLUTE NRBC 0.00 0.00 - 0.01 K/uL NEUTROPHILS 50 32 - 75 % LYMPHOCYTES 30 12 - 49 % MONOCYTES 8 5 - 13 % EOSINOPHILS 11 (H) 0 - 7 % BASOPHILS 0 0 - 1 % IMMATURE GRANULOCYTES 0 0.0 - 0.5 % ABS. NEUTROPHILS 2.8 1.8 - 8.0 K/UL  
 ABS. LYMPHOCYTES 1.7 0.8 - 3.5 K/UL  
 ABS. MONOCYTES 0.5 0.0 - 1.0 K/UL  
 ABS. EOSINOPHILS 0.6 (H) 0.0 - 0.4 K/UL  
 ABS. BASOPHILS 0.0 0.0 - 0.1 K/UL  
 ABS. IMM. GRANS. 0.0 0.00 - 0.04 K/UL  
 DF AUTOMATED PROTHROMBIN TIME + INR Collection Time: 11/02/18  4:54 AM  
Result Value Ref Range INR 1.3 (H) 0.9 - 1.1 Prothrombin time 13.4 (H) 9.0 - 11.1 sec CBC W/O DIFF Collection Time: 11/02/18  4:54 AM  
Result Value Ref Range WBC 7.2 4.1 - 11.1 K/uL  
 RBC 2.27 (L) 4.10 - 5.70 M/uL HGB 7.4 (L) 12.1 - 17.0 g/dL HCT 22.6 (L) 36.6 - 50.3 % MCV 99.6 (H) 80.0 - 99.0 FL  
 MCH 32.6 26.0 - 34.0 PG  
 MCHC 32.7 30.0 - 36.5 g/dL  
 RDW 15.4 (H) 11.5 - 14.5 % PLATELET 231 (L) 204 - 400 K/uL MPV 10.1 8.9 - 12.9 FL  
 NRBC 0.0 0  WBC ABSOLUTE NRBC 0.00 0.00 - 0.01 K/uL METABOLIC PANEL, BASIC Collection Time: 11/02/18  4:54 AM  
Result Value Ref Range Sodium 147 (H) 136 - 145 mmol/L Potassium 3.9 3.5 - 5.1 mmol/L Chloride 116 (H) 97 - 108 mmol/L  
 CO2 23 21 - 32 mmol/L Anion gap 8 5 - 15 mmol/L Glucose 87 65 - 100 mg/dL BUN 19 6 - 20 MG/DL Creatinine 1.14 0.70 - 1.30 MG/DL  
 BUN/Creatinine ratio 17 12 - 20 GFR est AA >60 >60 ml/min/1.73m2 GFR est non-AA 60 (L) >60 ml/min/1.73m2 Calcium 7.5 (L) 8.5 - 10.1 MG/DL  
TYPE & SCREEN Collection Time: 11/02/18  6:25 AM  
Result Value Ref Range Crossmatch Expiration 11/05/2018 ABO/Rh(D) A NEGATIVE  Antibody screen NEG

## 2018-11-02 NOTE — ED PROVIDER NOTES
EMERGENCY DEPARTMENT HISTORY AND PHYSICAL EXAM 
 
 
Date: 11/1/2018 Patient Name: Haroon Amor History of Presenting Illness Chief Complaint Patient presents with  Rectal Bleeding History Provided By: Patient and daughter HPI: Haroon Amor, 80 y.o. male with PMHx significant for HTN, afib, depression, hypercholesterolemia, anemia, CKD, anxiety, hypothyroidism, presents via EMS transferred from hospitals to the ED for GI bleed. Daughter reports intermittent weakness during the day today. She states pt had two near syncopal episodes, noting the first one was while they were playing golf this morning. Pt states he noticed blood in his underwear today. He also reports having black stools x a few days. Pt denies any associated abd pain. Chart review from hospitals shows pt hgb dropped from 10.6 to 7.8. Chart review shows pt was orthostatic as well. Pt states he has not had a BM since he has been at hospitals. Daughter reports hx of anemia and states pt takes PO iron. She also states pt takes Xarelto for afib and last took it yesterday. Daughter states pt ambulates with a cane at baseline. She denies hx of GI bleed. Pt endorses occasional alcohol use. Pt specifically denies any fever, chills, CP, SOB. There are no other complaints, changes, or physical findings at this time. PCP: Jose Solomon NP Current Facility-Administered Medications Medication Dose Route Frequency Provider Last Rate Last Dose  
 0.9% sodium chloride infusion  75 mL/hr IntraVENous CONTINUOUS Eliud Lopez  mL/hr at 11/01/18 2220 125 mL/hr at 11/01/18 2220 Current Outpatient Medications Medication Sig Dispense Refill  bisacodyl (DULCOLAX, BISACODYL,) 10 mg suppository Insert 10 mg into rectum as needed.  rivaroxaban (XARELTO) 15 mg tab tablet Take  by mouth daily.  neomycin-bacitracin-polymyxin (TRIPLE ANTIBIOTIC) 3.5mg-400 unit- 5,000 unit/gram ointment Apply  to affected area as needed.  Ferrous Fumarate 325 mg (106 mg iron) tab Take  by mouth two (2) times a day.  nitroglycerin (NITROSTAT) 0.4 mg SL tablet by SubLINGual route every five (5) minutes as needed for Chest Pain (not to exceed 3 doses).  escitalopram oxalate (LEXAPRO) 5 mg tablet Take 1 Tab by mouth daily. 30 Tab 2  
 loratadine (CLARITIN) 10 mg tablet Take 1 Tab by mouth daily. Indications: ALLERGIC RHINITIS 30 Tab 5  
 atorvastatin (LIPITOR) 80 mg tablet Take 1 Tab by mouth daily. 30 Tab 11  
 famotidine (PEPCID) 20 mg tablet Take 1 Tab by mouth two (2) times a day. (Patient taking differently: Take 20 mg by mouth daily.) 30 Tab 11  
 albuterol (PROVENTIL VENTOLIN) 2.5 mg /3 mL (0.083 %) nebulizer solution 2.5 mg by Nebulization route as needed for Wheezing. Every 6 hrs prn SOB.  hydrocortisone (ANUSOL-HC) 2.5 % rectal cream Insert  into rectum as needed for Hemorrhoids.  traMADol (ULTRAM) 50 mg tablet Take 50 mg by mouth every eight (8) hours as needed for Pain.  sodium chloride (OCEAN) 0.65 % nasal spray 2 Sprays by Both Nostrils route four (4) times daily.  calcium polycarbophil (FIBER-TABS) 625 mg tablet Take 1 Tab by mouth two (2) times a day. Indications: DIARRHEA 60 Tab 2  
 finasteride (PROSCAR) 5 mg tablet Take 1 Tab by mouth daily. Indications: SYMPTOMATIC BENIGN PROSTATIC HYPERPLASIA 30 Tab 5  Protein Supplement liqd Take 1 CUP by mouth two (2) times a day. 60 Bottle 5  cyanocobalamin (VITAMIN B12) 1,000 mcg/mL injection 1,000 mcg by IntraMUSCular route every month. Every 3 weeks  mineral oil (FLEET) enema Insert  into rectum as needed for Constipation (daily as needed).  glycerin, adult, (SUPPOSITORY ADULT) suppository Insert 1 Suppository into rectum as needed.  OTHER Spiritus Fermenti ([address] [place] [address]) May keep wine in room.  multivitamin (ONE A DAY) tablet Take 1 Tab by mouth daily.     
 acetaminophen (TYLENOL) 325 mg tablet Take 650 mg by mouth every four (4) hours as needed for Pain. Take two tabs po bid.  levothyroxine (SYNTHROID) 75 mcg tablet Take  by mouth Daily (before breakfast).  acetaminophen (TYLENOL) 80 mg suppository Insert 650 mg into rectum every four (4) hours as needed for Fever.  OTHER Natural Laxative 30cc po every day  magnesium hydroxide (CLEMENS MILK OF MAGNESIA) 400 mg/5 mL suspension Take 30 mL by mouth daily as needed for Constipation.  alum-mag hydroxide-simeth (MAALOX ADVANCED) 200-200-20 mg/5 mL susp Take 30 mL by mouth every four (4) hours as needed. Past History Past Medical History: 
Past Medical History:  
Diagnosis Date  Anemia  Anxiety  Arrhythmia Afib  Chronic kidney disease  Constipation  Depression  Hypercholesterolemia  Hypertension  NSTEMI (non-ST elevated myocardial infarction) ([facility] [address] 11/21/2016 [facility]:  JACKELIN to SVG-L-PLB and JACKELIN to SVG-LADstents 11/16 [facility] Pacemaker 2009 Followed By Dr Liv Martinez  S/P CABG x 4 1995  Thyroid disease   
 hypothyroidism Past Surgical History: 
Past Surgical History:  
Procedure Laterality Date  CARDIAC SURG PROCEDURE UNLIST    
 pacer  CARDIAC SURG PROCEDURE UNLIST  07/20/1995  
 quadruple bypass [address]  
 4V  HX PACEMAKER  04/23/2003 Family History: 
Family History Problem Relation Age of Onset  Glaucoma Mother  Heart Disease Father  Heart Disease Sister Social History: 
Social History Tobacco Use  Smoking status: Former Smoker  Smokeless tobacco: Never Used Substance Use Topics  Alcohol use: Yes Alcohol/week: 4.2 oz Types: 7 Glasses of wine per week  Drug use: No  
 
 
Allergies: Allergies Allergen Reactions  Hydrocodone Nausea Only Review of Systems Review of Systems Constitutional: Negative for chills and fever. HENT: Negative.   Negative for congestion, facial swelling, rhinorrhea, sore throat, trouble swallowing and voice change. Eyes: Negative. Respiratory: Negative. Negative for apnea, cough, chest tightness, shortness of breath and wheezing. Cardiovascular: Negative. Negative for chest pain, palpitations and leg swelling. Gastrointestinal: Positive for blood in stool. Negative for abdominal distention, abdominal pain, constipation, diarrhea, nausea and vomiting. Endocrine: Negative. Negative for cold intolerance, heat intolerance and polyuria. Genitourinary: Negative. Negative for difficulty urinating, dysuria, flank pain, frequency, hematuria and urgency. Musculoskeletal: Negative. Negative for arthralgias, back pain, myalgias, neck pain and neck stiffness. Skin: Negative. Negative for color change and rash. Neurological: Positive for syncope (near) and weakness. Negative for dizziness, facial asymmetry, speech difficulty, light-headedness, numbness and headaches. Hematological: Negative. Does not bruise/bleed easily. Psychiatric/Behavioral: Negative. Negative for confusion and self-injury. The patient is not nervous/anxious. Physical Exam  
Physical Exam  
Constitutional: He is oriented to person, place, and time. Vital signs are normal. He is cooperative. He appears toxic. He has a sickly appearance. He appears ill. He appears distressed. Elderly male HENT:  
Head: Normocephalic and atraumatic. Mouth/Throat: Mucous membranes are normal. No posterior oropharyngeal erythema. Eyes: Conjunctivae and EOM are normal. Pupils are equal, round, and reactive to light. Neck: Normal range of motion. Cardiovascular: Normal rate, regular rhythm, normal heart sounds and intact distal pulses. Exam reveals no gallop and no friction rub. No murmur heard. Pulmonary/Chest: Effort normal and breath sounds normal. No respiratory distress. He has no wheezes. He has no rales. He exhibits no tenderness. Abdominal: Soft. Bowel sounds are normal. He exhibits no distension and no mass. There is no tenderness. There is no rebound and no guarding. Genitourinary:  
Genitourinary Comments: Rectal Exam performed at [facility] Musculoskeletal: Normal range of motion. He exhibits no edema, tenderness or deformity. Neurological: He is alert and oriented to person, place, and time. He displays normal reflexes. No cranial nerve deficit. He exhibits normal muscle tone. Coordination normal.  
Skin: Skin is warm. No rash noted. Psychiatric: He has a normal mood and affect. Nursing note and vitals reviewed. Diagnostic Study Results Labs - Recent Results (from the past 12 hour(s)) OCCULT BLOOD, STOOL Collection Time: 11/01/18  5:08 PM  
Result Value Ref Range Occult blood, stool POSITIVE (A) NEG    
URINALYSIS W/ RFLX MICROSCOPIC Collection Time: 11/01/18  5:10 PM  
Result Value Ref Range Color YELLOW/STRAW Appearance CLEAR CLEAR Specific gravity 1.015 1.003 - 1.030    
 pH (UA) 6.5 5.0 - 8.0 Protein NEGATIVE  NEG mg/dL Glucose NEGATIVE  NEG mg/dL Ketone NEGATIVE  NEG mg/dL Bilirubin NEGATIVE  NEG Blood NEGATIVE  NEG Urobilinogen 0.2 0.2 - 1.0 EU/dL Nitrites NEGATIVE  NEG Leukocyte Esterase NEGATIVE  NEG    
CBC WITH AUTOMATED DIFF Collection Time: 11/01/18  5:25 PM  
Result Value Ref Range WBC 5.6 4.1 - 11.1 K/uL  
 RBC 2.45 (L) 4.10 - 5.70 M/uL HGB 7.8 (L) 12.1 - 17.0 g/dL HCT 23.9 (L) 36.6 - 50.3 % MCV 97.6 80.0 - 99.0 FL  
 MCH 31.8 26.0 - 34.0 PG  
 MCHC 32.6 30.0 - 36.5 g/dL  
 RDW 15.0 (H) 11.5 - 14.5 % PLATELET 752 (L) 035 - 400 K/uL MPV 9.9 8.9 - 12.9 FL  
 NRBC 0.0 0  WBC ABSOLUTE NRBC 0.00 0.00 - 0.01 K/uL NEUTROPHILS 56 32 - 75 % LYMPHOCYTES 27 12 - 49 % MONOCYTES 8 5 - 13 % EOSINOPHILS 9 (H) 0 - 7 % BASOPHILS 0 0 - 1 % IMMATURE GRANULOCYTES 0 0.0 - 0.5 % ABS. NEUTROPHILS 3.1 1.8 - 8.0 K/UL ABS. LYMPHOCYTES 1.5 0.8 - 3.5 K/UL  
 ABS. MONOCYTES 0.4 0.0 - 1.0 K/UL  
 ABS. EOSINOPHILS 0.5 (H) 0.0 - 0.4 K/UL  
 ABS. BASOPHILS 0.0 0.0 - 0.1 K/UL  
 ABS. IMM. GRANS. 0.0 0.00 - 0.04 K/UL  
 DF AUTOMATED PROTHROMBIN TIME + INR Collection Time: 11/01/18  5:25 PM  
Result Value Ref Range INR 1.3 (H) 0.9 - 1.1 Prothrombin time 12.5 (H) 9.0 - 11.1 sec METABOLIC PANEL, COMPREHENSIVE Collection Time: 11/01/18  5:25 PM  
Result Value Ref Range Sodium 143 136 - 145 mmol/L Potassium 4.0 3.5 - 5.1 mmol/L Chloride 106 97 - 108 mmol/L  
 CO2 25 21 - 32 mmol/L Anion gap 12 5 - 15 mmol/L Glucose 102 (H) 65 - 100 mg/dL BUN 23 (H) 6 - 20 MG/DL Creatinine 1.40 (H) 0.70 - 1.30 MG/DL  
 BUN/Creatinine ratio 16 12 - 20 GFR est AA 57 (L) >60 ml/min/1.73m2 GFR est non-AA 47 (L) >60 ml/min/1.73m2 Calcium 8.7 8.5 - 10.1 MG/DL Bilirubin, total 0.3 0.2 - 1.0 MG/DL  
 ALT (SGPT) 20 12 - 78 U/L  
 AST (SGOT) 29 15 - 37 U/L Alk. phosphatase 98 45 - 117 U/L Protein, total 6.4 6.4 - 8.2 g/dL Albumin 3.4 (L) 3.5 - 5.0 g/dL Globulin 3.0 2.0 - 4.0 g/dL A-G Ratio 1.1 1.1 - 2.2 LACTIC ACID Collection Time: 11/01/18  5:25 PM  
Result Value Ref Range Lactic acid 1.6 0.4 - 2.0 MMOL/L  
TROPONIN I Collection Time: 11/01/18  5:25 PM  
Result Value Ref Range Troponin-I, Qt. <0.05 <0.05 ng/mL TYPE + CROSSMATCH Collection Time: 11/01/18  6:30 PM  
Result Value Ref Range Crossmatch Expiration 11/04/2018 ABO/Rh(D) A NEGATIVE Antibody screen NEG Unit number [id number] Blood component type [facility] Unit division 00 Status of unit ALLOCATED Crossmatch result Compatible Unit number [id number] Blood component type [facility] Unit division 00 Status of unit ALLOCATED Crossmatch result Compatible EKG, 12 LEAD, INITIAL Collection Time: 11/01/18  6:57 PM  
Result Value Ref Range  Ventricular Rate 61 BPM  
 Atrial Rate 66 BPM  
 QRS Duration 166 ms  
 Q-T Interval 528 ms QTC Calculation (Bezet) 531 ms Calculated R Axis -47 degrees Calculated T Axis 104 degrees Diagnosis Ventricular-paced rhythm Abnormal ECG No previous ECGs available Confirmed by Maribeth Soto MD, --- ([id number]) on 11/1/2018 8:00:25 PM 
  
HGB & HCT Collection Time: 11/01/18  7:37 PM  
Result Value Ref Range HGB 7.5 (L) 12.1 - 17.0 g/dL HCT 23.3 (L) 36.6 - 50.3 % CBC WITH AUTOMATED DIFF Collection Time: 11/01/18 10:13 PM  
Result Value Ref Range WBC 5.5 4.1 - 11.1 K/uL  
 RBC 2.36 (L) 4.10 - 5.70 M/uL HGB 7.4 (L) 12.1 - 17.0 g/dL HCT 23.5 (L) 36.6 - 50.3 % MCV 99.6 (H) 80.0 - 99.0 FL  
 MCH 31.4 26.0 - 34.0 PG  
 MCHC 31.5 30.0 - 36.5 g/dL  
 RDW 15.4 (H) 11.5 - 14.5 % PLATELET 517 (L) 241 - 400 K/uL MPV 10.1 8.9 - 12.9 FL  
 NRBC 0.0 0  WBC ABSOLUTE NRBC 0.00 0.00 - 0.01 K/uL NEUTROPHILS 50 32 - 75 % LYMPHOCYTES 30 12 - 49 % MONOCYTES 8 5 - 13 % EOSINOPHILS 11 (H) 0 - 7 % BASOPHILS 0 0 - 1 % IMMATURE GRANULOCYTES 0 0.0 - 0.5 % ABS. NEUTROPHILS 2.8 1.8 - 8.0 K/UL  
 ABS. LYMPHOCYTES 1.7 0.8 - 3.5 K/UL  
 ABS. MONOCYTES 0.5 0.0 - 1.0 K/UL  
 ABS. EOSINOPHILS 0.6 (H) 0.0 - 0.4 K/UL  
 ABS. BASOPHILS 0.0 0.0 - 0.1 K/UL  
 ABS. IMM. GRANS. 0.0 0.00 - 0.04 K/UL  
 DF AUTOMATED Radiologic Studies - No orders to display CT Results  (Last 48 hours) None CXR Results  (Last 48 hours) 11/01/18 1715  XR CHEST SNGL V Final result Impression:  IMPRESSION: Status post median sternotomy with pacemaker. No acute abnormality. Narrative:  EXAM:  XR CHEST SNGL V.  
INDICATION: Weakness. COMPARISON: None. FINDINGS:   
A portable AP radiograph of the chest was obtained at 1707/1708 hours. There are  
sternal sutures. There is a pacemaker in the left chest with leads projecting  
over the right atrium and ventricle. Lines and tubes: The patient is on a cardiac monitor. Lungs: The lungs are clear. Pleura: There is no pneumothorax or pleural effusion. Mediastinum: The cardiac and mediastinal contours and pulmonary vascularity are  
normal. The aorta is atherosclerotic. Bones and soft tissues: The bones and soft tissues are grossly within normal  
limits. Medical Decision Making I am the first provider for this patient. I reviewed the vital signs, available nursing notes, past medical history, past surgical history, family history and social history. Vital Signs-Reviewed the patient's vital signs. Patient Vitals for the past 12 hrs: 
 Temp Pulse Resp BP SpO2  
11/01/18 2327  64 18  97 % 11/01/18 2324 97.9 °F (36.6 °C) 62 23 159/64 95 % 11/01/18 2315  60 25 159/64 93 % 11/01/18 2300  65 22 142/73   
11/01/18 2245  64 18 155/69   
11/01/18 2230  60 20 150/83 95 % 11/01/18 2200  69 28 148/87 95 % 11/01/18 2152 98.1 °F (36.7 °C) 73 20 149/71 96 % Pulse Oximetry Analysis - 95% on RA Cardiac Monitor:  
Rate: 64 bpm 
 
Records Reviewed: Nursing Notes, Old Medical Records, Previous electrocardiograms, Previous Radiology Studies and Previous Laboratory Studies Provider Notes (Medical Decision Making):  
Patient presents with melena. Currently stable vitals without tachycardia. Exam nonperitoneal.  DDx: upper gi bleed 2/2 PUD, Esophageal varices; Iron intake, pepto bismol. Will get labs, obtain two large bore IV's, provide IVF resuscitation, administer IV PPI, send type and screen and transfuse as needed. Will monitor closely. ED Course:  
Initial assessment performed. The patients presenting problems have been discussed, and they are in agreement with the care plan formulated and outlined with them. I have encouraged them to ask questions as they arise throughout their visit. Medications  
loratadine (CLARITIN) tablet 10 mg (10 mg Oral Given 11/3/18 1049) atorvastatin (LIPITOR) tablet 80 mg (80 mg Oral Given 11/3/18 1048) finasteride (PROSCAR) tablet 5 mg (5 mg Oral Given 11/3/18 1048)  
albuterol (PROVENTIL VENTOLIN) nebulizer solution 2.5 mg (not administered)  
bisacodyl (DULCOLAX) suppository 10 mg (not administered) therapeutic multivitamin [facility]) tablet 1 Tab (1 Tab Oral Given 11/3/18 1049) levothyroxine (SYNTHROID) tablet 75 mcg (0 mcg Oral Held 11/3/18 0740) cyanocobalamin (VITAMIN B12) injection 1,000 mcg (not administered)  
sodium chloride (NS) flush 5-10 mL ( IntraVENous Canceled Entry 11/3/18 0600)  
sodium chloride (NS) flush 5-10 mL (10 mL IntraVENous Given 11/3/18 0429)  
acetaminophen (TYLENOL) tablet 650 mg (650 mg Oral Given 11/2/18 0622) ondansetron (ZOFRAN) injection 4 mg (not administered)  
bisacodyl (DULCOLAX) suppository 10 mg (not administered)  
escitalopram oxalate (LEXAPRO) tablet 5 mg (5 mg Oral Given 11/3/18 1048) pantoprazole (PROTONIX) injection 40 mg (40 mg IntraVENous Given 11/3/18 1048) simethicone (MYLICON) 03EL/9.1OP oral drops 80 mg (not administered)  
ferrous sulfate tablet 325 mg (325 mg Oral Given 11/3/18 1048) hydrALAZINE (APRESOLINE) 20 mg/mL injection 10 mg (not administered)  
furosemide (LASIX) 10 mg/mL injection (40 mg  Given 11/2/18 1155) Consult Note: 
10:25 Alicia Johnson MD spoke with Dr. Oral Lambert Specialty: GI 
Discussed pts hx, disposition, and available diagnostic and imaging results. Reviewed care plans. Will evaluate. CONSULT NOTE:  
12:00AM 
Bright Cowan MD spoke with Dr. Marylen Glen, Specialty: Hospitalist 
Discussed pt's hx, disposition, and available diagnostic and imaging results. Reviewed care plans. Consultant will evaluate pt for admission.  
Written by Britni Pope ED Scribe, as dictated by Bright Cowan MD. 
 
CRITICAL CARE NOTE : 
 
12:04 AM 
 
IMPENDING DETERIORATION -Airway, Respiratory, Cardiovascular, Metabolic and Renal 
 ASSOCIATED RISK FACTORS - Hypotension, Shock, Hypoxia, Dysrhythmia, Metabolic changes and Dehydration MANAGEMENT- Bedside Assessment and Supervision of Care INTERPRETATION -  Xrays, CT Scan, Blood Gases, ECG, Blood Pressure and Cardiac Output Measures INTERVENTIONS - hemodynamic mgmt, Metobolic interventions and management of acute blood loss anemia due to acute GI bleeding needing resuscitation, management of hypoxic respiratory failure requiring oxygen therapy CASE REVIEW - Hospitalist, Medical Sub-Specialist, Nursing and Family TREATMENT RESPONSE -Stable PERFORMED BY - Self NOTES   : 
 
I have spent 70 minutes of critical care time involved in lab review, consultations with specialist, family decision- making, bedside attention and documentation. During this entire length of time I was immediately available to the patient . Critical Care: The reason for providing this level of medical care for this critically ill patient was due to a critical illness that impaired one or more vital organ systems, such that there was a high probability of imminent or life threatening deterioration in the patient's condition. This care involved high complexity decision making to assess, manipulate, and support vital system functions, to treat this degree of vital organ system failure, and to prevent further life threatening deterioration of the patients condition. Antwan Wheeler MD 
 
 
Disposition: 
12:07 AM 
Patient is being admitted to the hospital. The results of their tests and reasons for their admission have been discussed with them and/or available family. They convey agreement and understanding for the need to be admitted and for their admission diagnosis. Consultation has been made with the inpatient physician specialist for hospitalization. PLAN: 
1. Admit to hospitalist 
 
Return to ED if worse Diagnosis Clinical Impression: 1. Acute blood loss anemia 2. Chronic anticoagulation 3. Orthostatic hypotension 4. Near syncope 5. Acute GI bleeding 6. Persistent atrial fibrillation ([address] 7. Acute respiratory failure with hypoxia ([address] Attestations: This note is prepared by Sridhar Garrido, acting as Scribe for Reg Lai MD. Reg Lai MD: The scribe's documentation has been prepared under my direction and personally reviewed by me in its entirety. I confirm that the note above accurately reflects all work, treatment, procedures, and medical decision making performed by me. This note will not be viewable in [address]

## 2018-11-02 NOTE — ED TRIAGE NOTES
Pt transferred from [facility] ED. Pt reports anemia that has had issues off and on for entire life. Pt was playing golf and felt dizzy, no loss of consciousness.

## 2018-11-02 NOTE — PROGRESS NOTES
* No post-op diagnosis entered * 
* No post-op diagnosis entered * Bedside and Verbal shift change report given to dayday (oncoming nurse) by Anna Pierce (offgoing nurse). Report included the following information SBAR, Kardex, Recent Results and Med Rec Status. Zone Phone:   [phone] Significant changes during shift:  New admit Patient Information Ugo Rowe 80 y.o. 
11/1/2018  9:46 PM by Sandy Miller MD. Ugo Rowe was admitted from Home 
 
Problem List 
 
Patient Active Problem List  
 Diagnosis Date Noted  GI bleed 11/02/2018  Iron deficiency anemia 12/07/2017  Anemia in stage 3 chronic kidney disease ([facility] [address] 12/07/2017  
 NSTEMI (non-ST elevated myocardial infarction) ([facility] [address] 11/21/2016  Dementia 05/06/2015  Atrial fibrillation ([facility] [address] 05/06/2015  Pacemaker 01/01/2009  S/P CABG x 4 01/01/1995 Past Medical History:  
Diagnosis Date  Anemia  Anxiety  Arrhythmia Afib  Chronic kidney disease  Constipation  Depression  Hypercholesterolemia  Hypertension  NSTEMI (non-ST elevated myocardial infarction) ([facility] [address] 11/21/2016 [facility]:  JACKELIN to SVG-L-PLB and JACKELIN to SVG-LADstents 11/16 [address] [place] [address] Pacemaker 2009 Followed By Dr Sandra Fonseca  S/P CABG x 4 1995  Thyroid disease   
 hypothyroidism Core Measures: CVA: No No 
CHF:No No 
PNA:No No 
 
 
 
Activity Status: OOB to Chair No 
Ambulated this shift Yes Bed Rest No 
 
Supplemental O2: (If Applicable) NC Yes NRB No 
Venti-mask No 
On 1.5 Liters/min LINES AND DRAINS: 
Central Line? No Placement date  Reason Medically Necessary PICC LINE? No Placement date Reason Medically Necessary Urinary Catheter? No Placement Date  Reason Medically Necessary DVT prophylaxis: DVT prophylaxis Med- No 
DVT prophylaxis SCD or HARSHA- Yes Wounds: (If Applicable) Wounds- No 
 
Location Patient Safety: 
 
Falls Score Total Score: 3 Safety Level_______ Bed Alarm On?  Yes 
 Sitter? No 
 
Plan for upcoming shift: try to wean off 02 Discharge Plan: No  
 
Active Consults: 
IP CONSULT TO GASTROENTEROLOGY 
IP CONSULT TO GASTROENTEROLOGY

## 2018-11-02 NOTE — ED NOTES
Assumed care of pt from Armin Valverde RN at bedside with verbal report consisting of Situation, Background, Assessment, and Recommendations (SBAR). Pt is A&O x 4. Pt resting comfortably on the stretcher in a position of comfort. Call bell within reach. Side rails x 2. Cardiac monitor x 3. Stretcher locked in the lowest position. Concerns and questions addressed at this time. Pt in no acute distress at this the time. Will continue to monitor.

## 2018-11-02 NOTE — PROGRESS NOTES
Gastroenterology Daily Progress Note (Dr. Maribeth Ko) [facility] Admit Date: 11/1/2018 Subjective:   
  
Patient seen with daughter at bedside. EGD earlier today was cancelled due to pt developing respiratory distress with pulmonary edema. Breathing better on 3 L NC. Current Facility-Administered Medications Medication Dose Route Frequency  loratadine (CLARITIN) tablet 10 mg  10 mg Oral DAILY  atorvastatin (LIPITOR) tablet 80 mg  80 mg Oral DAILY  finasteride (PROSCAR) tablet 5 mg  5 mg Oral DAILY  albuterol (PROVENTIL VENTOLIN) nebulizer solution 2.5 mg  2.5 mg Nebulization PRN  
 bisacodyl (DULCOLAX) suppository 10 mg  10 mg Rectal PRN  
 [START ON 11/3/2018] therapeutic multivitamin (THERAGRAN) tablet 1 Tab  1 Tab Oral DAILY  [START ON 11/3/2018] levothyroxine (SYNTHROID) tablet 75 mcg  75 mcg Oral ACB  [START ON 11/16/2018] cyanocobalamin (VITAMIN B12) injection 1,000 mcg  1,000 mcg IntraMUSCular EVERY MONTH  
 sodium chloride (NS) flush 5-10 mL  5-10 mL IntraVENous Q8H  
 sodium chloride (NS) flush 5-10 mL  5-10 mL IntraVENous PRN  
 acetaminophen (TYLENOL) tablet 650 mg  650 mg Oral Q6H PRN  
 ondansetron (ZOFRAN) injection 4 mg  4 mg IntraVENous Q6H PRN  
 bisacodyl (DULCOLAX) suppository 10 mg  10 mg Rectal DAILY PRN  
 escitalopram oxalate (LEXAPRO) tablet 5 mg  5 mg Oral DAILY  pantoprazole (PROTONIX) injection 40 mg  40 mg IntraVENous Q12H  
 [START ON 11/3/2018] ferrous sulfate tablet 325 mg  1 Tab Oral DAILY WITH BREAKFAST  [START ON 11/3/2018] furosemide (LASIX) injection 20 mg  20 mg IntraVENous ONCE  hydrALAZINE (APRESOLINE) 20 mg/mL injection 10 mg  10 mg IntraVENous Q6H PRN Objective:  
 
Visit Vitals /72 (BP 1 Location: Left arm, BP Patient Position: At rest) Pulse 60 Temp 97.3 °F (36.3 °C) Resp 25 Ht 5' 11\" (1.803 m) Wt 68.5 kg (151 lb 0.2 oz) SpO2 96% BMI 21.06 kg/m² Blood pressure 158/72, pulse 60, temperature 97.3 °F (36.3 °C), resp. rate 25, height 5' 11\" (1.803 m), weight 68.5 kg (151 lb 0.2 oz), SpO2 96 %. 11/02 0701 - 11/02 1900 In: -  
Out: [address] [facility] [[id number]] 10/31 1901 - 11/02 0700 In: -  
Out: 150 [Urine:150] Intake/Output Summary (Last 24 hours) at 11/2/2018 1624 Last data filed at 11/2/2018 1448 Gross per 24 hour Intake  Output 1325 ml Net -1325 ml Physical Exam:  
 
General: elderly, pale WM in NAD Chest:  Crackles at bases b/l Heart: S1, S2, RRR 
GI: Soft, nontender, nondistended, + BS Labs:  
 
 
Recent Results (from the past 24 hour(s)) OCCULT BLOOD, STOOL Collection Time: 11/01/18  5:08 PM  
Result Value Ref Range Occult blood, stool POSITIVE (A) NEG    
URINALYSIS W/ RFLX MICROSCOPIC Collection Time: 11/01/18  5:10 PM  
Result Value Ref Range Color YELLOW/STRAW Appearance CLEAR CLEAR Specific gravity 1.015 1.003 - 1.030    
 pH (UA) 6.5 5.0 - 8.0 Protein NEGATIVE  NEG mg/dL Glucose NEGATIVE  NEG mg/dL Ketone NEGATIVE  NEG mg/dL Bilirubin NEGATIVE  NEG Blood NEGATIVE  NEG Urobilinogen 0.2 0.2 - 1.0 EU/dL Nitrites NEGATIVE  NEG Leukocyte Esterase NEGATIVE  NEG    
CBC WITH AUTOMATED DIFF Collection Time: 11/01/18  5:25 PM  
Result Value Ref Range WBC 5.6 4.1 - 11.1 K/uL  
 RBC 2.45 (L) 4.10 - 5.70 M/uL HGB 7.8 (L) 12.1 - 17.0 g/dL HCT 23.9 (L) 36.6 - 50.3 % MCV 97.6 80.0 - 99.0 FL  
 MCH 31.8 26.0 - 34.0 PG  
 MCHC 32.6 30.0 - 36.5 g/dL  
 RDW 15.0 (H) 11.5 - 14.5 % PLATELET 930 (L) 333 - 400 K/uL MPV 9.9 8.9 - 12.9 FL  
 NRBC 0.0 0  WBC ABSOLUTE NRBC 0.00 0.00 - 0.01 K/uL NEUTROPHILS 56 32 - 75 % LYMPHOCYTES 27 12 - 49 % MONOCYTES 8 5 - 13 % EOSINOPHILS 9 (H) 0 - 7 % BASOPHILS 0 0 - 1 % IMMATURE GRANULOCYTES 0 0.0 - 0.5 % ABS. NEUTROPHILS 3.1 1.8 - 8.0 K/UL  
 ABS. LYMPHOCYTES 1.5 0.8 - 3.5 K/UL ABS. MONOCYTES 0.4 0.0 - 1.0 K/UL  
 ABS. EOSINOPHILS 0.5 (H) 0.0 - 0.4 K/UL  
 ABS. BASOPHILS 0.0 0.0 - 0.1 K/UL  
 ABS. IMM. GRANS. 0.0 0.00 - 0.04 K/UL  
 DF AUTOMATED PROTHROMBIN TIME + INR Collection Time: 11/01/18  5:25 PM  
Result Value Ref Range INR 1.3 (H) 0.9 - 1.1 Prothrombin time 12.5 (H) 9.0 - 11.1 sec METABOLIC PANEL, COMPREHENSIVE Collection Time: 11/01/18  5:25 PM  
Result Value Ref Range Sodium 143 136 - 145 mmol/L Potassium 4.0 3.5 - 5.1 mmol/L Chloride 106 97 - 108 mmol/L  
 CO2 25 21 - 32 mmol/L Anion gap 12 5 - 15 mmol/L Glucose 102 (H) 65 - 100 mg/dL BUN 23 (H) 6 - 20 MG/DL Creatinine 1.40 (H) 0.70 - 1.30 MG/DL  
 BUN/Creatinine ratio 16 12 - 20 GFR est AA 57 (L) >60 ml/min/1.73m2 GFR est non-AA 47 (L) >60 ml/min/1.73m2 Calcium 8.7 8.5 - 10.1 MG/DL Bilirubin, total 0.3 0.2 - 1.0 MG/DL  
 ALT (SGPT) 20 12 - 78 U/L  
 AST (SGOT) 29 15 - 37 U/L Alk. phosphatase 98 45 - 117 U/L Protein, total 6.4 6.4 - 8.2 g/dL Albumin 3.4 (L) 3.5 - 5.0 g/dL Globulin 3.0 2.0 - 4.0 g/dL A-G Ratio 1.1 1.1 - 2.2 LACTIC ACID Collection Time: 11/01/18  5:25 PM  
Result Value Ref Range Lactic acid 1.6 0.4 - 2.0 MMOL/L  
TROPONIN I Collection Time: 11/01/18  5:25 PM  
Result Value Ref Range Troponin-I, Qt. <0.05 <0.05 ng/mL TYPE + CROSSMATCH Collection Time: 11/01/18  6:30 PM  
Result Value Ref Range Crossmatch Expiration 11/04/2018 ABO/Rh(D) A NEGATIVE Antibody screen NEG Unit number [id number] Blood component type  [facility] Unit division 00 Status of unit ALLOCATED Crossmatch result Compatible Unit number [id number] Blood component type [facility] Unit division 00 Status of unit ALLOCATED Crossmatch result Compatible EKG, 12 LEAD, INITIAL Collection Time: 11/01/18  6:57 PM  
Result Value Ref Range  Ventricular Rate 61 BPM  
 Atrial Rate 66 BPM  
 QRS Duration 166 ms  
 Q-T Interval 528 ms QTC Calculation (Bezet) 531 ms Calculated R Axis -47 degrees Calculated T Axis 104 degrees Diagnosis Ventricular-paced rhythm Abnormal ECG No previous ECGs available Confirmed by Paula Escamilla MD, --- ([id number]) on 11/1/2018 8:00:25 PM 
  
HGB & HCT Collection Time: 11/01/18  7:37 PM  
Result Value Ref Range HGB 7.5 (L) 12.1 - 17.0 g/dL HCT 23.3 (L) 36.6 - 50.3 % CBC WITH AUTOMATED DIFF Collection Time: 11/01/18 10:13 PM  
Result Value Ref Range WBC 5.5 4.1 - 11.1 K/uL  
 RBC 2.36 (L) 4.10 - 5.70 M/uL HGB 7.4 (L) 12.1 - 17.0 g/dL HCT 23.5 (L) 36.6 - 50.3 % MCV 99.6 (H) 80.0 - 99.0 FL  
 MCH 31.4 26.0 - 34.0 PG  
 MCHC 31.5 30.0 - 36.5 g/dL  
 RDW 15.4 (H) 11.5 - 14.5 % PLATELET 038 (L) 167 - 400 K/uL MPV 10.1 8.9 - 12.9 FL  
 NRBC 0.0 0  WBC ABSOLUTE NRBC 0.00 0.00 - 0.01 K/uL NEUTROPHILS 50 32 - 75 % LYMPHOCYTES 30 12 - 49 % MONOCYTES 8 5 - 13 % EOSINOPHILS 11 (H) 0 - 7 % BASOPHILS 0 0 - 1 % IMMATURE GRANULOCYTES 0 0.0 - 0.5 % ABS. NEUTROPHILS 2.8 1.8 - 8.0 K/UL  
 ABS. LYMPHOCYTES 1.7 0.8 - 3.5 K/UL  
 ABS. MONOCYTES 0.5 0.0 - 1.0 K/UL  
 ABS. EOSINOPHILS 0.6 (H) 0.0 - 0.4 K/UL  
 ABS. BASOPHILS 0.0 0.0 - 0.1 K/UL  
 ABS. IMM. GRANS. 0.0 0.00 - 0.04 K/UL  
 DF AUTOMATED PROTHROMBIN TIME + INR Collection Time: 11/02/18  4:54 AM  
Result Value Ref Range INR 1.3 (H) 0.9 - 1.1 Prothrombin time 13.4 (H) 9.0 - 11.1 sec CBC W/O DIFF Collection Time: 11/02/18  4:54 AM  
Result Value Ref Range WBC 7.2 4.1 - 11.1 K/uL  
 RBC 2.27 (L) 4.10 - 5.70 M/uL HGB 7.4 (L) 12.1 - 17.0 g/dL HCT 22.6 (L) 36.6 - 50.3 % MCV 99.6 (H) 80.0 - 99.0 FL  
 MCH 32.6 26.0 - 34.0 PG  
 MCHC 32.7 30.0 - 36.5 g/dL  
 RDW 15.4 (H) 11.5 - 14.5 % PLATELET 082 (L) 754 - 400 K/uL MPV 10.1 8.9 - 12.9 FL  
 NRBC 0.0 0  WBC ABSOLUTE NRBC 0.00 0.00 - 0.01 K/uL METABOLIC PANEL, BASIC  
 Collection Time: 11/02/18  4:54 AM  
Result Value Ref Range Sodium 147 (H) 136 - 145 mmol/L Potassium 3.9 3.5 - 5.1 mmol/L Chloride 116 (H) 97 - 108 mmol/L  
 CO2 23 21 - 32 mmol/L Anion gap 8 5 - 15 mmol/L Glucose 87 65 - 100 mg/dL BUN 19 6 - 20 MG/DL Creatinine 1.14 0.70 - 1.30 MG/DL  
 BUN/Creatinine ratio 17 12 - 20 GFR est AA >60 >60 ml/min/1.73m2 GFR est non-AA 60 (L) >60 ml/min/1.73m2 Calcium 7.5 (L) 8.5 - 10.1 MG/DL  
TYPE & SCREEN Collection Time: 11/02/18  6:25 AM  
Result Value Ref Range Crossmatch Expiration 11/05/2018 ABO/Rh(D) A NEGATIVE Antibody screen NEG   
NT-PRO BNP Collection Time: 11/02/18  1:30 PM  
Result Value Ref Range NT pro-BNP 7,797 (H) 0 - 450 PG/ML  
HGB & HCT Collection Time: 11/02/18  1:30 PM  
Result Value Ref Range HGB 7.8 (L) 12.1 - 17.0 g/dL HCT 23.8 (L) 36.6 - 50.3 % LABRCNT(wbc:2,hgb:2,hct:2,plt:2,) Recent Labs 11/02/18 
0454 11/01/18 
1725 * 143  
K 3.9 4.0  
* 106 CO2 23 25 BUN 19 23* CREA 1.14 1.40* GLU 87 102* CA 7.5* 8.7 Recent Labs 11/02/18 
0454 11/01/18 
1725 INR 1.3* 1.3* PTP 13.4* 12.5* Recent Labs 11/01/18 
1725 SGOT 29 AP 98  
TP 6.4 ALB 3.4*  
GLOB 3.0 Lab Results Component Value Date/Time Folate 74.6 (H) 10/15/2018 11:10 AM  
 
Impression: 
 
Acute GI blood loss anemia Melena Chronic anticoagulation on xarelto Pulmonary edema CHF with pacemaker Plan: 
Patient with acute GI blood loss anemia who was for EGD today but had acute pulmonary edema and EGD had to be cancelled. I have d/w pt and his daughter about the timing of EGD which will need to wait until he is more stable from cardiopulmonary standpoint.   Has never had an EGD or colonoscopy but preference is still to proceed with EGD and will need to be optimized and done with anesthesia support.  
-clear liquid diet then NPO after MN 
 -EGD when stable from cardiopulmonary standpoint Joanna Prieto MD 
 
11/2/2018 [address] [place] [address] [phone] [id number] Loc: [phone]

## 2018-11-03 LAB
ANION GAP SERPL CALC-SCNC: 9 MMOL/L (ref 5–15)
APPEARANCE UR: CLEAR
BACTERIA URNS QL MICRO: NEGATIVE /HPF
BILIRUB UR QL: NEGATIVE
BUN SERPL-MCNC: 20 MG/DL (ref 6–20)
BUN/CREAT SERPL: 14 (ref 12–20)
CALCIUM SERPL-MCNC: 8.2 MG/DL (ref 8.5–10.1)
CHLORIDE SERPL-SCNC: 108 MMOL/L (ref 97–108)
CO2 SERPL-SCNC: 25 MMOL/L (ref 21–32)
COLOR UR: NORMAL
COMMENT, HOLDF: NORMAL
CREAT SERPL-MCNC: 1.41 MG/DL (ref 0.7–1.3)
EPITH CASTS URNS QL MICRO: NORMAL /LPF
ERYTHROCYTE [DISTWIDTH] IN BLOOD BY AUTOMATED COUNT: 15.5 % (ref 11.5–14.5)
GLUCOSE SERPL-MCNC: 91 MG/DL (ref 65–100)
GLUCOSE UR STRIP.AUTO-MCNC: NEGATIVE MG/DL
HCT VFR BLD AUTO: 23.9 % (ref 36.6–50.3)
HCT VFR BLD AUTO: 27.5 % (ref 36.6–50.3)
HGB BLD-MCNC: 7.7 G/DL (ref 12.1–17)
HGB BLD-MCNC: 8.9 G/DL (ref 12.1–17)
HGB UR QL STRIP: NEGATIVE
HYALINE CASTS URNS QL MICRO: NORMAL /LPF (ref 0–5)
KETONES UR QL STRIP.AUTO: NEGATIVE MG/DL
LEUKOCYTE ESTERASE UR QL STRIP.AUTO: NEGATIVE
MCH RBC QN AUTO: 31.7 PG (ref 26–34)
MCHC RBC AUTO-ENTMCNC: 32.2 G/DL (ref 30–36.5)
MCV RBC AUTO: 98.4 FL (ref 80–99)
NITRITE UR QL STRIP.AUTO: NEGATIVE
NRBC # BLD: 0 K/UL (ref 0–0.01)
NRBC BLD-RTO: 0 PER 100 WBC
PH UR STRIP: 6.5 [PH] (ref 5–8)
PLATELET # BLD AUTO: 146 K/UL (ref 150–400)
PMV BLD AUTO: 10.4 FL (ref 8.9–12.9)
POTASSIUM SERPL-SCNC: 3.5 MMOL/L (ref 3.5–5.1)
PROT UR STRIP-MCNC: NEGATIVE MG/DL
RBC # BLD AUTO: 2.43 M/UL (ref 4.1–5.7)
RBC #/AREA URNS HPF: NORMAL /HPF (ref 0–5)
SAMPLES BEING HELD,HOLD: NORMAL
SODIUM SERPL-SCNC: 142 MMOL/L (ref 136–145)
SP GR UR REFRACTOMETRY: 1.01 (ref 1–1.03)
UA: UC IF INDICATED,UAUC: NORMAL
UROBILINOGEN UR QL STRIP.AUTO: 1 EU/DL (ref 0.2–1)
WBC # BLD AUTO: 7.6 K/UL (ref 4.1–11.1)
WBC URNS QL MICRO: NORMAL /HPF (ref 0–4)

## 2018-11-03 PROCEDURE — 74011250636 HC RX REV CODE- 250/636: Performed by: INTERNAL MEDICINE

## 2018-11-03 PROCEDURE — 74011250637 HC RX REV CODE- 250/637: Performed by: INTERNAL MEDICINE

## 2018-11-03 PROCEDURE — 85027 COMPLETE CBC AUTOMATED: CPT | Performed by: INTERNAL MEDICINE

## 2018-11-03 PROCEDURE — C9113 INJ PANTOPRAZOLE SODIUM, VIA: HCPCS | Performed by: INTERNAL MEDICINE

## 2018-11-03 PROCEDURE — 80048 BASIC METABOLIC PNL TOTAL CA: CPT | Performed by: INTERNAL MEDICINE

## 2018-11-03 PROCEDURE — 85018 HEMOGLOBIN: CPT | Performed by: INTERNAL MEDICINE

## 2018-11-03 PROCEDURE — 36415 COLL VENOUS BLD VENIPUNCTURE: CPT | Performed by: INTERNAL MEDICINE

## 2018-11-03 PROCEDURE — 51798 US URINE CAPACITY MEASURE: CPT

## 2018-11-03 PROCEDURE — 65660000000 HC RM CCU STEPDOWN

## 2018-11-03 PROCEDURE — 81001 URINALYSIS AUTO W/SCOPE: CPT | Performed by: INTERNAL MEDICINE

## 2018-11-03 RX ADMIN — FERROUS SULFATE TAB 325 MG (65 MG ELEMENTAL FE) 325 MG: 325 (65 FE) TAB at 10:48

## 2018-11-03 RX ADMIN — Medication 10 ML: at 16:15

## 2018-11-03 RX ADMIN — ESCITALOPRAM OXALATE 5 MG: 10 TABLET ORAL at 10:48

## 2018-11-03 RX ADMIN — ATORVASTATIN CALCIUM 80 MG: 40 TABLET, FILM COATED ORAL at 10:48

## 2018-11-03 RX ADMIN — PANTOPRAZOLE SODIUM 40 MG: 40 INJECTION, POWDER, FOR SOLUTION INTRAVENOUS at 10:48

## 2018-11-03 RX ADMIN — PANTOPRAZOLE SODIUM 40 MG: 40 INJECTION, POWDER, FOR SOLUTION INTRAVENOUS at 22:24

## 2018-11-03 RX ADMIN — FINASTERIDE 5 MG: 5 TABLET, FILM COATED ORAL at 10:48

## 2018-11-03 RX ADMIN — Medication 10 ML: at 22:24

## 2018-11-03 RX ADMIN — THERA TABS 1 TABLET: TAB at 10:49

## 2018-11-03 RX ADMIN — LORATADINE 10 MG: 10 TABLET ORAL at 10:49

## 2018-11-03 RX ADMIN — Medication 10 ML: at 04:29

## 2018-11-03 NOTE — PROGRESS NOTES
Provided pt with incentive spirometer and instructions for how to use. Had patient demonstrate back. Applied condom cath to pt due to frequency from lasix

## 2018-11-03 NOTE — PROGRESS NOTES
Bedside and Verbal shift change report given to dong (oncoming nurse) by Caroline Osorio (offgoing nurse). Report included the following information SBAR, Kardex, Recent Results and Med Rec Status. 
  
Zone Phone:  [phone] 
  
  
Significant changes during shift:  none 
  
  
  
Patient Information 
  
Dannie Snell 80 y.o. 
11/1/2018  9:46 PM by Jorge Luis Cruz MD. Dannie Snell was admitted from Home 
  
Problem List 
  
    
Patient Active Problem List  
  Diagnosis Date Noted  GI bleed 11/02/2018  Iron deficiency anemia 12/07/2017  Anemia in stage 3 chronic kidney disease ([facility] [address] 12/07/2017  
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  
  
    
Past Medical History:  
Diagnosis Date  Anemia    
 Anxiety    
 Arrhythmia    
  Afib  Chronic kidney disease    
 Constipation    
 Depression    
 Hypercholesterolemia    
 Hypertension    
 NSTEMI (non-ST elevated myocardial infarction) ([facility] 11/21/2016  
  [facility]:  JACKELIN to SVG-L-PLB and JACKELIN to SVG-LADstents 11/16  Pacemaker 2009  
  Followed By Dr Reggie Potts  S/P CABG x 4 1995  Thyroid disease    
  hypothyroidism  
  
  
  
Core Measures: 
  
CVA: No No 
CHF:No No 
PNA:No No 
  
  
  
Activity Status: 
  
OOB to Chair No 
Ambulated this shift Yes Bed Rest No 
  
Supplemental O2: (If Applicable) 
  
NC Yes NRB No 
Venti-mask No 
On 1.5 Liters/min 
  
  
LINES AND DRAINS: 
  
Central Line? No Placement date  Reason Medically Necessary  
  
PICC LINE? No Placement date Reason Medically Necessary  
  
Urinary Catheter? No Placement Date  Reason Medically Necessary  
  
DVT prophylaxis: 
  
DVT prophylaxis Med- No 
DVT prophylaxis SCD or HARSHA- Yes  
  
Wounds: (If Applicable) 
  
Wounds- No 
  
Location  
  
Patient Safety: 
  
Falls Score Total Score: 4 Safety Level_______ Bed Alarm On? Yes Sitter?  No 
  
 Plan for upcoming shift: try to wean off 02 
  
  
  
Discharge Plan: No  
  
Active Consults: 
IP CONSULT TO GASTROENTEROLOGY 
IP CONSULT TO GASTROENTEROLOGY

## 2018-11-03 NOTE — PROGRESS NOTES
Reason for Admission:   Acute hypoxic respiratory failure due to acute pulmonary edema, resolved RRAT Score:    33 Resources/supports as identified by patient/family:   NOEL and daughter Top Challenges facing patient (as identified by patient/family and CM): Finances/Medication cost?  No concerns expressed by patient Transportation? Daughter can provide Support system or lack thereof? Daughter and NOEL Living arrangements? In assisted living but has his own apartment Self-care/ADLs/Cognition? Patient does his own self care and has light housekeeping done by assisted Current Advanced Directive/Advance Care Plan:  None on file Plan for utilizing home health:  TBD Likelihood of readmission: High based on RRAT Transition of Care Plan:  Courtney Sheehan is a 80 yr old  male admitted from [facility] due to acute hypoxic respiratory failure, pulmonary edema and GI bleed. Upon interview patient is alert and oriented x3. Patient advised he lives alone but in assisted living at [facility]. Patient receives light housekeeping services and he uses his rollator to walk across campus to strength training class. Patient verified demographics, NOK and PCP. Daughter at bedside verified all information provided by patient was accurrate. Patient has had to have oxygen upon admission but is not on home o2. GI consulted for GI bleed. Patient now NPO for EGD Cm to follow hospital course and would recommend therapy consults prior to discharge. Care Management Interventions PCP Verified by CM: Yes Transition of Care Consult (CM Consult): Assisted Living([facility] in [address] [facility]) [place] Signup: No 
Discharge Durable Medical Equipment: No(owns a cane and rollator) Physical Therapy Consult: No 
 Occupational Therapy Consult: No 
Speech Therapy Consult: No 
Current Support Network: Assisted Living(Lives in his own apartment) Confirm Follow Up Transport: Family Plan discussed with Pt/Family/Caregiver: Yes Freedom of Choice Offered:  Yes

## 2018-11-03 NOTE — PROGRESS NOTES
Bedside and Verbal shift change report given to Dawn (oncoming nurse) by Jeancarlos Garcia (offgoing nurse). Report included the following information SBAR, Kardex, Recent Results and Med Rec Status. Zone Phone:  [phone] Significant changes during shift:  Confusion at bedtime Patient Information Girma Drummond 80 y.o. 
11/1/2018  9:46 PM by Suzie Charles MD. Girma Drummond was admitted from Home 
 
Problem List 
 
Patient Active Problem List  
 Diagnosis Date Noted  GI bleed 11/02/2018  Iron deficiency anemia 12/07/2017  Anemia in stage 3 chronic kidney disease ([facility] [address] 12/07/2017  
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  
Diagnosis Date  Anemia  Anxiety  Arrhythmia Afib  Chronic kidney disease  Constipation  Depression  Hypercholesterolemia  Hypertension  NSTEMI (non-ST elevated myocardial infarction) ([facility] [address] 11/21/2016 [facility]:  JACKELIN to SVG-L-PLB and JACKELIN to SVG-LADstents 11/16 [facility] Pacemaker 2009 Followed By Dr Catherine Cardenas  S/P CABG x 4 1995  Thyroid disease   
 hypothyroidism Core Measures: CVA: No No 
CHF:No No 
PNA:No No 
 
 
 
Activity Status: OOB to Chair No 
Ambulated this shift Yes Bed Rest No 
 
Supplemental O2: (If Applicable) NC Yes NRB No 
Venti-mask No 
On 1.5 Liters/min LINES AND DRAINS: 
Central Line? No Placement date  Reason Medically Necessary PICC LINE? No Placement date Reason Medically Necessary Urinary Catheter? No Placement Date  Reason Medically Necessary DVT prophylaxis: DVT prophylaxis Med- No 
DVT prophylaxis SCD or HARSHA- Yes Wounds: (If Applicable) Wounds- No 
 
Location Patient Safety: 
 
Falls Score Total Score: 4 Safety Level_______ Bed Alarm On? Yes Sitter?  No 
 
Plan for upcoming shift: try to wean off 02 
 
 
 Discharge Plan: No  
 
Active Consults: 
IP CONSULT TO GASTROENTEROLOGY 
IP CONSULT TO GASTROENTEROLOGY

## 2018-11-03 NOTE — PROGRESS NOTES
Hospitalist Progress Note NAME: Blanca Zaman :  1923 MRN:  [id number] Assessment / Plan: 
Acute hypoxic respiratory failure due to acute pulmonary edema, resolved 
-clinically vol overload on , CXR showed pulmonary edema 
-received IV lasix x2 
-obtain Echo 
-currently stable on RA 
-O2 suppl, wean as tolerated Acute blood loss anemia secondary to GI bleed -IVF discontinued due to pulmonary edema -IV PPI 
-transfuse prn, trend H/H 
-NPO for possible EGD 
-appreciate GI's consultation CKD 3 
-mild elevation cr, likely due to lasix and NPO status 
-if no EGD planned, encourage PO intake 
-hold nephrotoxic agents, repeat bmp Hyperlipidemia Continue Lipitor 80 mg daily Hypothyroidism Continue levothyroxine 75 mcg daily Atrial fibrillation/CAD 
-Hold Xarelto because of GI bleed Depression 
-Continue home medication Lexapro 5 mg daily 
  
Mild dementia 
-daughter reported mild confusion at baseline 
-avoid sedating meds 
-sitter prn 
  
Code Status: full code Surrogate Decision Maker: daughter   
DVT Prophylaxis: SCDs GI Prophylaxis: not indicated  
Baseline: from [facility] facility Subjective: Chief Complaint / Reason for Physician Visit Pt seen at bedside. Confused but pleasant. No acute issue overnight. Had 1 BM overnight per chart documentation. Has been weaned off to RA. Discussed with RN events overnight. Review of Systems: 
Symptom Y/N Comments  Symptom Y/N Comments Fever/Chills n   Chest Pain n   
Poor Appetite    Edema Cough    Abdominal Pain n   
Sputum    Joint Pain SOB/YOUNG n   Pruritis/Rash Nausea/vomit    Tolerating PT/OT Diarrhea    Tolerating Diet Constipation    Other Could NOT obtain due to:   
 
Objective: VITALS:  
Last 24hrs VS reviewed since prior progress note. Most recent are: 
Patient Vitals for the past 24 hrs: 
 Temp Pulse Resp BP SpO2  
18 0806 99 °F (37.2 °C) 65 20 126/51 91 % 11/03/18 0415 98.5 °F (36.9 °C) (!) 59 14 119/47   
11/03/18 0000 98.6 °F (37 °C) 66 18 141/62 95 % 11/02/18 1949 97.7 °F (36.5 °C) 60  130/52 98 % 11/02/18 1630 97.7 °F (36.5 °C) 85 22 143/72 100 % 11/02/18 1345 97.3 °F (36.3 °C) 60 25 158/72 96 % 11/02/18 1300  60 24 136/48 96 % 11/02/18 1200  61 26 165/66 95 % 11/02/18 1155  61  161/81   
11/02/18 1145  62 25  95 % 11/02/18 1030  63 24 148/56 94 % 11/02/18 0945  64 26 160/82 95 % Intake/Output Summary (Last 24 hours) at 11/3/2018 0915 Last data filed at 11/3/2018 0000 Gross per 24 hour Intake  Output 1975 ml Net -1975 ml PHYSICAL EXAM: 
General: WD, WN. Alert, cooperative, no acute distress   
EENT:  EOMI. Anicteric sclerae. MMM Resp:  Crackles, no wheezing. No accessory muscle use CV:  Regular  rhythm,  No edema GI:  Soft, Non distended, Non tender.  +BS Neurologic:  Alert and oriented X 2, normal speech Psych:   Not anxious nor agitated Skin:  No rashes. No jaundice Reviewed most current lab test results and cultures  YES Reviewed most current radiology test results   YES Review and summation of old records today    NO Reviewed patient's current orders and MAR    YES 
PMH/ reviewed - no change compared to H&P 
________________________________________________________________________ Care Plan discussed with: 
  Comments Patient x Family RN x Care Manager Consultant Multidiciplinary team rounds were held today with , nursing, pharmacist and clinical coordinator. Patient's plan of care was discussed; medications were reviewed and discharge planning was addressed. ________________________________________________________________________ Total NON critical care TIME:  35   Minutes Total CRITICAL CARE TIME Spent:   Minutes non procedure based Comments >50% of visit spent in counseling and coordination of care ________________________________________________________________________ Andressa Garcia MD  
 
Procedures: see electronic medical records for all procedures/Xrays and details which were not copied into this note but were reviewed prior to creation of Plan. LABS: 
I reviewed today's most current labs and imaging studies. Pertinent labs include: 
Recent Labs 11/03/18 0430 11/02/18 1839 11/02/18 0478 85 38 64 11/02/18 
0454 11/01/18 
2213 WBC 7.6  --   --  7.2 5.5 HGB 7.7* 8.9* 7.8* 7.4* 7.4* HCT 23.9*  --  23.8* 22.6* 23.5*  
*  --   --  129* 133* Recent Labs 11/03/18 0430 11/02/18 0454 11/01/18 
1725  147* 143  
K 3.5 3.9 4.0  
 116* 106 CO2 25 23 25 GLU 91 87 102* BUN 20 19 23* CREA 1.41* 1.14 1.40* CA 8.2* 7.5* 8.7 ALB  --   --  3.4* TBILI  --   --  0.3 SGOT  --   --  29 ALT  --   --  20 INR  --  1.3* 1.3* Signed: Andressa Garcia MD

## 2018-11-03 NOTE — PROGRESS NOTES
Gastroenterology Daily Progress Note Jorge Brothers for Dr. Carlos Rush) [facility] Admit Date: 11/1/2018 Subjective:   
  
Patient seen with daughter at bedside. Hgb is better. He has no GI issues to report. Current Facility-Administered Medications Medication Dose Route Frequency  loratadine (CLARITIN) tablet 10 mg  10 mg Oral DAILY  atorvastatin (LIPITOR) tablet 80 mg  80 mg Oral DAILY  finasteride (PROSCAR) tablet 5 mg  5 mg Oral DAILY  albuterol (PROVENTIL VENTOLIN) nebulizer solution 2.5 mg  2.5 mg Nebulization PRN  
 bisacodyl (DULCOLAX) suppository 10 mg  10 mg Rectal PRN  therapeutic multivitamin (THERAGRAN) tablet 1 Tab  1 Tab Oral DAILY  levothyroxine (SYNTHROID) tablet 75 mcg  75 mcg Oral ACB  [START ON 11/16/2018] cyanocobalamin (VITAMIN B12) injection 1,000 mcg  1,000 mcg IntraMUSCular EVERY MONTH  
 sodium chloride (NS) flush 5-10 mL  5-10 mL IntraVENous Q8H  
 sodium chloride (NS) flush 5-10 mL  5-10 mL IntraVENous PRN  
 acetaminophen (TYLENOL) tablet 650 mg  650 mg Oral Q6H PRN  
 ondansetron (ZOFRAN) injection 4 mg  4 mg IntraVENous Q6H PRN  
 bisacodyl (DULCOLAX) suppository 10 mg  10 mg Rectal DAILY PRN  
 escitalopram oxalate (LEXAPRO) tablet 5 mg  5 mg Oral DAILY  pantoprazole (PROTONIX) injection 40 mg  40 mg IntraVENous Q12H  simethicone (MYLICON) 16JO/4.2BY oral drops 80 mg  1.2 mL Oral Multiple  ferrous sulfate tablet 325 mg  1 Tab Oral DAILY WITH BREAKFAST  hydrALAZINE (APRESOLINE) 20 mg/mL injection 10 mg  10 mg IntraVENous Q6H PRN Objective:  
 
Visit Vitals /53 (BP 1 Location: Left arm, BP Patient Position: At rest) Pulse 90 Temp 98.7 °F (37.1 °C) Resp 20 Ht 5' 11\" (1.803 m) Wt 68.5 kg (151 lb 0.2 oz) SpO2 91% BMI 21.06 kg/m² Blood pressure 139/53, pulse 90, temperature 98.7 °F (37.1 °C), resp. rate 20, height 5' 11\" (1.803 m), weight 68.5 kg (151 lb 0.2 oz), SpO2 91 %. 11/03 0701 - 11/03 1900 In: -  
Out: 200 [Urine:200] 11/01 1901 - 11/03 0700 In: -  
Out: 2125 Jacy Murrell Intake/Output Summary (Last 24 hours) at 11/3/2018 1248 Last data filed at 11/3/2018 1057 Gross per 24 hour Intake  Output 1975 ml Net -1975 ml Physical Exam:  
 
General: elderly, pale WM in NAD Chest:  Crackles at bases b/l Heart: S1, S2, RRR 
GI: Soft, nontender, nondistended, + BS Labs:  
 
 
Recent Results (from the past 24 hour(s)) NT-PRO BNP Collection Time: 11/02/18  1:30 PM  
Result Value Ref Range NT pro-BNP 7,797 (H) 0 - 450 PG/ML  
HGB & HCT Collection Time: 11/02/18  1:30 PM  
Result Value Ref Range HGB 7.8 (L) 12.1 - 17.0 g/dL HCT 23.8 (L) 36.6 - 50.3 % HEMOGLOBIN Collection Time: 11/02/18  6:39 PM  
Result Value Ref Range HGB 8.9 (L) 12.1 - 17.0 g/dL METABOLIC PANEL, BASIC Collection Time: 11/03/18  4:30 AM  
Result Value Ref Range Sodium 142 136 - 145 mmol/L Potassium 3.5 3.5 - 5.1 mmol/L Chloride 108 97 - 108 mmol/L  
 CO2 25 21 - 32 mmol/L Anion gap 9 5 - 15 mmol/L Glucose 91 65 - 100 mg/dL BUN 20 6 - 20 MG/DL Creatinine 1.41 (H) 0.70 - 1.30 MG/DL  
 BUN/Creatinine ratio 14 12 - 20 GFR est AA 57 (L) >60 ml/min/1.73m2 GFR est non-AA 47 (L) >60 ml/min/1.73m2 Calcium 8.2 (L) 8.5 - 10.1 MG/DL  
CBC W/O DIFF Collection Time: 11/03/18  4:30 AM  
Result Value Ref Range WBC 7.6 4.1 - 11.1 K/uL  
 RBC 2.43 (L) 4.10 - 5.70 M/uL HGB 7.7 (L) 12.1 - 17.0 g/dL HCT 23.9 (L) 36.6 - 50.3 % MCV 98.4 80.0 - 99.0 FL  
 MCH 31.7 26.0 - 34.0 PG  
 MCHC 32.2 30.0 - 36.5 g/dL  
 RDW 15.5 (H) 11.5 - 14.5 % PLATELET 371 (L) 864 - 400 K/uL MPV 10.4 8.9 - 12.9 FL  
 NRBC 0.0 0  WBC ABSOLUTE NRBC 0.00 0.00 - 0.01 K/uL SAMPLES BEING HELD Collection Time: 11/03/18  4:30 AM  
Result Value Ref Range SAMPLES BEING HELD 2LAV   
 COMMENT Add-on orders for these samples will be processed based on acceptable specimen integrity and analyte stability, which may vary by analyte. URINALYSIS W/ REFLEX CULTURE Collection Time: 11/03/18 10:09 AM  
Result Value Ref Range Color YELLOW/STRAW Appearance CLEAR CLEAR Specific gravity 1.015 1.003 - 1.030    
 pH (UA) 6.5 5.0 - 8.0 Protein NEGATIVE  NEG mg/dL Glucose NEGATIVE  NEG mg/dL Ketone NEGATIVE  NEG mg/dL Bilirubin NEGATIVE  NEG Blood NEGATIVE  NEG Urobilinogen 1.0 0.2 - 1.0 EU/dL Nitrites NEGATIVE  NEG Leukocyte Esterase NEGATIVE  NEG    
 WBC 0-4 0 - 4 /hpf  
 RBC 0-5 0 - 5 /hpf Epithelial cells FEW FEW /lpf Bacteria NEGATIVE  NEG /hpf  
 UA:UC IF INDICATED CULTURE NOT INDICATED BY UA RESULT CNI Hyaline cast 0-2 0 - 5 /lpf HGB & HCT Collection Time: 11/03/18 12:18 PM  
Result Value Ref Range HGB 8.9 (L) 12.1 - 17.0 g/dL HCT 27.5 (L) 36.6 - 50.3 % LABRCNT(wbc:2,hgb:2,hct:2,plt:2,) Recent Labs 11/03/18 
0430 11/02/18 
0454 11/01/18 
1725  147* 143  
K 3.5 3.9 4.0  
 116* 106 CO2 25 23 25 BUN 20 19 23* CREA 1.41* 1.14 1.40* GLU 91 87 102* CA 8.2* 7.5* 8.7 Recent Labs 11/02/18 
0454 11/01/18 
1725 INR 1.3* 1.3* PTP 13.4* 12.5* Recent Labs 11/01/18 
1725 SGOT 29 AP 98  
TP 6.4 ALB 3.4*  
GLOB 3.0 Lab Results Component Value Date/Time Folate 74.6 (H) 10/15/2018 11:10 AM  
 
Impression: 
 
Acute GI blood loss anemia Melena Chronic anticoagulation on xarelto Pulmonary edema CHF with pacemaker Plan: 
 
His hgb is better and he has normal vital signs. Will start feeding him. Prefer to do EGD in a more controlled setting(regular days) for extreme of age/frail health when regular day staff and anesthesia help is available. Daughter is in agreement. D/w  His RN in person. Mabel Gonzáles MD 
 
11/3/2018 [address] 
 [address] [facility], [address] [address] Loc: [phone]

## 2018-11-04 LAB
ANION GAP SERPL CALC-SCNC: 8 MMOL/L (ref 5–15)
BUN SERPL-MCNC: 23 MG/DL (ref 6–20)
BUN/CREAT SERPL: 16 (ref 12–20)
CALCIUM SERPL-MCNC: 7.9 MG/DL (ref 8.5–10.1)
CHLORIDE SERPL-SCNC: 108 MMOL/L (ref 97–108)
CO2 SERPL-SCNC: 25 MMOL/L (ref 21–32)
COMMENT, HOLDF: NORMAL
CREAT SERPL-MCNC: 1.43 MG/DL (ref 0.7–1.3)
ERYTHROCYTE [DISTWIDTH] IN BLOOD BY AUTOMATED COUNT: 15.4 % (ref 11.5–14.5)
GLUCOSE SERPL-MCNC: 96 MG/DL (ref 65–100)
HCT VFR BLD AUTO: 24.8 % (ref 36.6–50.3)
HCT VFR BLD AUTO: 24.9 % (ref 36.6–50.3)
HGB BLD-MCNC: 7.9 G/DL (ref 12.1–17)
HGB BLD-MCNC: 8.1 G/DL (ref 12.1–17)
MCH RBC QN AUTO: 31.3 PG (ref 26–34)
MCHC RBC AUTO-ENTMCNC: 31.9 G/DL (ref 30–36.5)
MCV RBC AUTO: 98.4 FL (ref 80–99)
NRBC # BLD: 0 K/UL (ref 0–0.01)
NRBC BLD-RTO: 0 PER 100 WBC
PLATELET # BLD AUTO: 138 K/UL (ref 150–400)
PMV BLD AUTO: 10.6 FL (ref 8.9–12.9)
POTASSIUM SERPL-SCNC: 3.8 MMOL/L (ref 3.5–5.1)
RBC # BLD AUTO: 2.52 M/UL (ref 4.1–5.7)
SAMPLES BEING HELD,HOLD: NORMAL
SODIUM SERPL-SCNC: 141 MMOL/L (ref 136–145)
WBC # BLD AUTO: 7.1 K/UL (ref 4.1–11.1)

## 2018-11-04 PROCEDURE — 85027 COMPLETE CBC AUTOMATED: CPT | Performed by: INTERNAL MEDICINE

## 2018-11-04 PROCEDURE — 74011250637 HC RX REV CODE- 250/637: Performed by: INTERNAL MEDICINE

## 2018-11-04 PROCEDURE — C9113 INJ PANTOPRAZOLE SODIUM, VIA: HCPCS | Performed by: INTERNAL MEDICINE

## 2018-11-04 PROCEDURE — 65660000000 HC RM CCU STEPDOWN

## 2018-11-04 PROCEDURE — 36415 COLL VENOUS BLD VENIPUNCTURE: CPT | Performed by: INTERNAL MEDICINE

## 2018-11-04 PROCEDURE — 80048 BASIC METABOLIC PNL TOTAL CA: CPT | Performed by: INTERNAL MEDICINE

## 2018-11-04 PROCEDURE — 74011250636 HC RX REV CODE- 250/636: Performed by: INTERNAL MEDICINE

## 2018-11-04 PROCEDURE — 85018 HEMOGLOBIN: CPT | Performed by: INTERNAL MEDICINE

## 2018-11-04 RX ADMIN — PANTOPRAZOLE SODIUM 40 MG: 40 INJECTION, POWDER, FOR SOLUTION INTRAVENOUS at 21:49

## 2018-11-04 RX ADMIN — Medication 10 ML: at 21:49

## 2018-11-04 RX ADMIN — Medication 10 ML: at 06:00

## 2018-11-04 RX ADMIN — FINASTERIDE 5 MG: 5 TABLET, FILM COATED ORAL at 09:04

## 2018-11-04 RX ADMIN — Medication 10 ML: at 14:53

## 2018-11-04 RX ADMIN — LEVOTHYROXINE SODIUM 75 MCG: 75 TABLET ORAL at 07:30

## 2018-11-04 RX ADMIN — THERA TABS 1 TABLET: TAB at 09:04

## 2018-11-04 RX ADMIN — ATORVASTATIN CALCIUM 80 MG: 40 TABLET, FILM COATED ORAL at 09:04

## 2018-11-04 RX ADMIN — LORATADINE 10 MG: 10 TABLET ORAL at 09:04

## 2018-11-04 RX ADMIN — FERROUS SULFATE TAB 325 MG (65 MG ELEMENTAL FE) 325 MG: 325 (65 FE) TAB at 09:04

## 2018-11-04 RX ADMIN — ESCITALOPRAM OXALATE 5 MG: 10 TABLET ORAL at 09:04

## 2018-11-04 RX ADMIN — PANTOPRAZOLE SODIUM 40 MG: 40 INJECTION, POWDER, FOR SOLUTION INTRAVENOUS at 09:04

## 2018-11-04 NOTE — PROGRESS NOTES
Gastroenterology Daily Progress Note India Jones for Dr. Hawa Galvez) [facility] Admit Date: 11/1/2018 Subjective:   
  
 
Hgb is a little lower but he denies any GI distress 
hgb reviewed with pt. Current Facility-Administered Medications Medication Dose Route Frequency  loratadine (CLARITIN) tablet 10 mg  10 mg Oral DAILY  atorvastatin (LIPITOR) tablet 80 mg  80 mg Oral DAILY  finasteride (PROSCAR) tablet 5 mg  5 mg Oral DAILY  albuterol (PROVENTIL VENTOLIN) nebulizer solution 2.5 mg  2.5 mg Nebulization PRN  
 bisacodyl (DULCOLAX) suppository 10 mg  10 mg Rectal PRN  therapeutic multivitamin (THERAGRAN) tablet 1 Tab  1 Tab Oral DAILY  levothyroxine (SYNTHROID) tablet 75 mcg  75 mcg Oral ACB  [START ON 11/16/2018] cyanocobalamin (VITAMIN B12) injection 1,000 mcg  1,000 mcg IntraMUSCular EVERY MONTH  
 sodium chloride (NS) flush 5-10 mL  5-10 mL IntraVENous Q8H  
 sodium chloride (NS) flush 5-10 mL  5-10 mL IntraVENous PRN  
 acetaminophen (TYLENOL) tablet 650 mg  650 mg Oral Q6H PRN  
 ondansetron (ZOFRAN) injection 4 mg  4 mg IntraVENous Q6H PRN  
 bisacodyl (DULCOLAX) suppository 10 mg  10 mg Rectal DAILY PRN  
 escitalopram oxalate (LEXAPRO) tablet 5 mg  5 mg Oral DAILY  pantoprazole (PROTONIX) injection 40 mg  40 mg IntraVENous Q12H  simethicone (MYLICON) 91UV/3.0DR oral drops 80 mg  1.2 mL Oral Multiple  ferrous sulfate tablet 325 mg  1 Tab Oral DAILY WITH BREAKFAST  hydrALAZINE (APRESOLINE) 20 mg/mL injection 10 mg  10 mg IntraVENous Q6H PRN Objective:  
 
Visit Vitals /81 (BP 1 Location: Left leg, BP Patient Position: Sitting) Pulse 69 Temp 97.9 °F (36.6 °C) Resp 20 Ht 5' 11\" (1.803 m) Wt 68.5 kg (151 lb 0.2 oz) SpO2 98% BMI 21.06 kg/m² Blood pressure 150/81, pulse 69, temperature 97.9 °F (36.6 °C), resp. rate 20, height 5' 11\" (1.803 m), weight 68.5 kg (151 lb 0.2 oz), SpO2 98 %. No intake/output data recorded. 11/02 1901 - 11/04 0700 In: 0 Out: 1600 [Urine:1600] Intake/Output Summary (Last 24 hours) at 11/4/2018 1017 Last data filed at 11/4/2018 9851 Gross per 24 hour Intake 0 ml Output 1050 ml Net -1050 ml Physical Exam:  
 
General: elderly, pale WM in NAD Chest:  Crackles at bases b/l Heart: S1, S2, RRR 
GI: Soft, nontender, nondistended, + BS Labs:  
 
 
Recent Results (from the past 24 hour(s)) HGB & HCT Collection Time: 11/03/18 12:18 PM  
Result Value Ref Range HGB 8.9 (L) 12.1 - 17.0 g/dL HCT 27.5 (L) 36.6 - 50.3 % METABOLIC PANEL, BASIC Collection Time: 11/04/18  5:01 AM  
Result Value Ref Range Sodium 141 136 - 145 mmol/L Potassium 3.8 3.5 - 5.1 mmol/L Chloride 108 97 - 108 mmol/L  
 CO2 25 21 - 32 mmol/L Anion gap 8 5 - 15 mmol/L Glucose 96 65 - 100 mg/dL BUN 23 (H) 6 - 20 MG/DL Creatinine 1.43 (H) 0.70 - 1.30 MG/DL  
 BUN/Creatinine ratio 16 12 - 20 GFR est AA 56 (L) >60 ml/min/1.73m2 GFR est non-AA 46 (L) >60 ml/min/1.73m2 Calcium 7.9 (L) 8.5 - 10.1 MG/DL  
CBC W/O DIFF Collection Time: 11/04/18  5:01 AM  
Result Value Ref Range WBC 7.1 4.1 - 11.1 K/uL  
 RBC 2.52 (L) 4.10 - 5.70 M/uL HGB 7.9 (L) 12.1 - 17.0 g/dL HCT 24.8 (L) 36.6 - 50.3 % MCV 98.4 80.0 - 99.0 FL  
 MCH 31.3 26.0 - 34.0 PG  
 MCHC 31.9 30.0 - 36.5 g/dL  
 RDW 15.4 (H) 11.5 - 14.5 % PLATELET 232 (L) 266 - 400 K/uL MPV 10.6 8.9 - 12.9 FL  
 NRBC 0.0 0  WBC ABSOLUTE NRBC 0.00 0.00 - 0.01 K/uL SAMPLES BEING HELD Collection Time: 11/04/18  5:01 AM  
Result Value Ref Range SAMPLES BEING HELD 1LAV   
 COMMENT Add-on orders for these samples will be processed based on acceptable specimen integrity and analyte stability, which may vary by analyte. LABRCNT(wbc:2,hgb:2,hct:2,plt:2,) Recent Labs 11/04/18 
0501 11/03/18 
0430 11/02/18 
1384  142 147* K 3.8 3.5 3.9  108 116* CO2 25 25 23 BUN 23* 20 19 CREA 1.43* 1.41* 1.14  
GLU 96 91 87 CA 7.9* 8.2* 7.5* Recent Labs 11/02/18 
0454 11/01/18 
1725 INR 1.3* 1.3* PTP 13.4* 12.5* Recent Labs 11/01/18 
1725 SGOT 29 AP 98  
TP 6.4 ALB 3.4*  
GLOB 3.0 Lab Results Component Value Date/Time Folate 74.6 (H) 10/15/2018 11:10 AM  
 
Impression: 
 
Acute GI blood loss anemia Melena Chronic anticoagulation on xarelto Pulmonary edema CHF with pacemaker Plan: 
 
His hgb is low but stable. NPO after MN 
EGD with anesthesia's help tomorrow with Dr Jeri Riggs MD 
 
11/4/2018 [address] [facility], [address] [address] Loc: [phone]

## 2018-11-04 NOTE — PROGRESS NOTES
Hospitalist Progress Note NAME: Susan Meredith :  1923 MRN:  [id number] Assessment / Plan: 
Acute hypoxic respiratory failure due to acute pulmonary edema, resolved 
-clinically vol overload on  likely due to IVF administration - CXR showed pulmonary edema 
- received IV lasix x2  
-Echo 70%. -currently stable on RA 
-O2 suppl, wean as tolerated Acute blood loss anemia secondary to GI bleed -IV PPI 
-transfuse prn, trend H/H, hgb currently at 7.6 
-NPO after midnight  for EGD in the AM 
-appreciate GI's consultation CKD 3 
-mild elevation cr, likely due to lasix and NPO status 
-if no EGD planned, encourage PO intake 
-hold nephrotoxic agents, repeat bmp Hyperlipidemia Continue Lipitor 80 mg daily Hypothyroidism Continue levothyroxine 75 mcg daily Atrial fibrillation/CAD 
-Hold Xarelto because of GI bleed Depression 
-Continue home medication Lexapro 5 mg daily 
  
Mild dementia 
-daughter reported mild confusion at baseline 
-avoid sedating meds 
-sitter prn 
  
Code Status: full code Surrogate Decision Maker: daughter   
DVT Prophylaxis: SCDs GI Prophylaxis: not indicated  
Baseline: from [facility] facility Subjective: Chief Complaint / Reason for Physician Visit Pt seen at bedside. No acute changes. Stable on RA. Discussed with RN events overnight. Review of Systems: 
Symptom Y/N Comments  Symptom Y/N Comments Fever/Chills n   Chest Pain n   
Poor Appetite    Edema Cough    Abdominal Pain n   
Sputum    Joint Pain SOB/YOUNG n   Pruritis/Rash Nausea/vomit    Tolerating PT/OT Diarrhea    Tolerating Diet Constipation    Other Could NOT obtain due to:   
 
Objective: VITALS:  
Last 24hrs VS reviewed since prior progress note. Most recent are: 
Patient Vitals for the past 24 hrs: 
 Temp Pulse Resp BP SpO2  
18 0754 97.9 °F (36.6 °C) 69 20 150/81 98 % 18 0345 97.1 °F (36.2 °C) 70 18 128/64 92 % 11/03/18 2324 98 °F (36.7 °C) 60 18 111/46 96 % 11/03/18 1927 98.1 °F (36.7 °C) (!) 59 18 106/48 93 % 11/03/18 1525 98.6 °F (37 °C) 60 18 127/53 90 % 11/03/18 1158 98.7 °F (37.1 °C) 90 20 139/53 91 % Intake/Output Summary (Last 24 hours) at 11/4/2018 1045 Last data filed at 11/4/2018 2096 Gross per 24 hour Intake 0 ml Output 1050 ml Net -1050 ml PHYSICAL EXAM: 
General: WD, WN. Alert, cooperative, no acute distress   
EENT:  EOMI. Anicteric sclerae. MMM Resp:  Crackles, no wheezing. No accessory muscle use CV:  Regular  rhythm,  No edema GI:  Soft, Non distended, Non tender.  +BS Neurologic:  Alert and oriented X 2, normal speech Psych:   Not anxious nor agitated Skin:  No rashes. No jaundice Reviewed most current lab test results and cultures  YES Reviewed most current radiology test results   YES Review and summation of old records today    NO Reviewed patient's current orders and MAR    YES 
PMH/SH reviewed - no change compared to H&P 
________________________________________________________________________ Care Plan discussed with: 
  Comments Patient x Family RN x Care Manager Consultant Multidiciplinary team rounds were held today with , nursing, pharmacist and clinical coordinator. Patient's plan of care was discussed; medications were reviewed and discharge planning was addressed. ________________________________________________________________________ Total NON critical care TIME:  35   Minutes Total CRITICAL CARE TIME Spent:   Minutes non procedure based Comments >50% of visit spent in counseling and coordination of care    
________________________________________________________________________ Cipriano Lin MD  
 
Procedures: see electronic medical records for all procedures/Xrays and details which were not copied into this note but were reviewed prior to creation of Plan.    
 
LABS: 
 I reviewed today's most current labs and imaging studies. Pertinent labs include: 
Recent Labs 11/04/18 
0501 11/03/18 
1218 11/03/18 
0430  11/02/18 
1666 WBC 7.1  --  7.6  --  7.2 HGB 7.9* 8.9* 7.7*   < > 7.4* HCT 24.8* 27.5* 23.9*   < > 22.6*  
*  --  146*  --  129*  
 < > = values in this interval not displayed. Recent Labs 11/04/18 
0501 11/03/18 
0430 11/02/18 
0454 11/01/18 
1725  142 147* 143  
K 3.8 3.5 3.9 4.0  
 108 116* 106 CO2 25 25 23 25 GLU 96 91 87 102* BUN 23* 20 19 23* CREA 1.43* 1.41* 1.14 1.40* CA 7.9* 8.2* 7.5* 8.7 ALB  --   --   --  3.4* TBILI  --   --   --  0.3 SGOT  --   --   --  29 ALT  --   --   --  20 INR  --   --  1.3* 1.3* Signed: Antonieta Tirado MD

## 2018-11-04 NOTE — PROGRESS NOTES
Problem: Falls - Risk of 
Goal: *Absence of Falls Document Laura Martinez Fall Risk and appropriate interventions in the flowsheet. Outcome: Progressing Towards Goal 
Fall Risk Interventions: 
Mobility Interventions: Bed/chair exit alarm, Patient to call before getting OOB Medication Interventions: Evaluate medications/consider consulting pharmacy Elimination Interventions: Call light in reach, Patient to call for help with toileting needs History of Falls Interventions: Bed/chair exit alarm, Investigate reason for fall

## 2018-11-05 ENCOUNTER — ANESTHESIA (OUTPATIENT)
Dept: ENDOSCOPY | Age: 83
DRG: 377 | End: 2018-11-05
Payer: MEDICARE

## 2018-11-05 ENCOUNTER — ANESTHESIA EVENT (OUTPATIENT)
Dept: ENDOSCOPY | Age: 83
DRG: 377 | End: 2018-11-05
Payer: MEDICARE

## 2018-11-05 LAB
ANION GAP SERPL CALC-SCNC: 8 MMOL/L (ref 5–15)
BASOPHILS # BLD: 0 K/UL (ref 0–0.1)
BASOPHILS NFR BLD: 0 % (ref 0–1)
BUN SERPL-MCNC: 19 MG/DL (ref 6–20)
BUN/CREAT SERPL: 16 (ref 12–20)
CALCIUM SERPL-MCNC: 8.2 MG/DL (ref 8.5–10.1)
CHLORIDE SERPL-SCNC: 109 MMOL/L (ref 97–108)
CO2 SERPL-SCNC: 25 MMOL/L (ref 21–32)
CREAT SERPL-MCNC: 1.22 MG/DL (ref 0.7–1.3)
DIFFERENTIAL METHOD BLD: ABNORMAL
EOSINOPHIL # BLD: 0.6 K/UL (ref 0–0.4)
EOSINOPHIL NFR BLD: 12 % (ref 0–7)
ERYTHROCYTE [DISTWIDTH] IN BLOOD BY AUTOMATED COUNT: 15.1 % (ref 11.5–14.5)
GLUCOSE SERPL-MCNC: 82 MG/DL (ref 65–100)
HCT VFR BLD AUTO: 25.3 % (ref 36.6–50.3)
HCT VFR BLD AUTO: 25.5 % (ref 36.6–50.3)
HGB BLD-MCNC: 8 G/DL (ref 12.1–17)
HGB BLD-MCNC: 8 G/DL (ref 12.1–17)
IMM GRANULOCYTES # BLD: 0 K/UL (ref 0–0.04)
IMM GRANULOCYTES NFR BLD AUTO: 0 % (ref 0–0.5)
LYMPHOCYTES # BLD: 1.4 K/UL (ref 0.8–3.5)
LYMPHOCYTES NFR BLD: 26 % (ref 12–49)
MCH RBC QN AUTO: 31 PG (ref 26–34)
MCHC RBC AUTO-ENTMCNC: 31.4 G/DL (ref 30–36.5)
MCV RBC AUTO: 98.8 FL (ref 80–99)
MONOCYTES # BLD: 0.6 K/UL (ref 0–1)
MONOCYTES NFR BLD: 11 % (ref 5–13)
NEUTS SEG # BLD: 2.8 K/UL (ref 1.8–8)
NEUTS SEG NFR BLD: 51 % (ref 32–75)
NRBC # BLD: 0 K/UL (ref 0–0.01)
NRBC BLD-RTO: 0 PER 100 WBC
PLATELET # BLD AUTO: 143 K/UL (ref 150–400)
PMV BLD AUTO: 10.3 FL (ref 8.9–12.9)
POTASSIUM SERPL-SCNC: 3.5 MMOL/L (ref 3.5–5.1)
RBC # BLD AUTO: 2.58 M/UL (ref 4.1–5.7)
RBC MORPH BLD: ABNORMAL
SODIUM SERPL-SCNC: 142 MMOL/L (ref 136–145)
WBC # BLD AUTO: 5.4 K/UL (ref 4.1–11.1)

## 2018-11-05 PROCEDURE — 74011250637 HC RX REV CODE- 250/637: Performed by: SPECIALIST

## 2018-11-05 PROCEDURE — 77030019988 HC FCPS ENDOSC DISP BSC -B: Performed by: SPECIALIST

## 2018-11-05 PROCEDURE — 74011250636 HC RX REV CODE- 250/636: Performed by: SPECIALIST

## 2018-11-05 PROCEDURE — 76040000019: Performed by: SPECIALIST

## 2018-11-05 PROCEDURE — 88305 TISSUE EXAM BY PATHOLOGIST: CPT | Performed by: SPECIALIST

## 2018-11-05 PROCEDURE — 85018 HEMOGLOBIN: CPT | Performed by: INTERNAL MEDICINE

## 2018-11-05 PROCEDURE — 65660000000 HC RM CCU STEPDOWN

## 2018-11-05 PROCEDURE — 36415 COLL VENOUS BLD VENIPUNCTURE: CPT | Performed by: INTERNAL MEDICINE

## 2018-11-05 PROCEDURE — 74011250637 HC RX REV CODE- 250/637: Performed by: INTERNAL MEDICINE

## 2018-11-05 PROCEDURE — 85025 COMPLETE CBC W/AUTO DIFF WBC: CPT | Performed by: INTERNAL MEDICINE

## 2018-11-05 PROCEDURE — 80048 BASIC METABOLIC PNL TOTAL CA: CPT | Performed by: INTERNAL MEDICINE

## 2018-11-05 PROCEDURE — 76060000031 HC ANESTHESIA FIRST 0.5 HR: Performed by: SPECIALIST

## 2018-11-05 PROCEDURE — 74011250636 HC RX REV CODE- 250/636: Performed by: INTERNAL MEDICINE

## 2018-11-05 PROCEDURE — 74011250636 HC RX REV CODE- 250/636

## 2018-11-05 PROCEDURE — 0DB98ZX EXCISION OF DUODENUM, VIA NATURAL OR ARTIFICIAL OPENING ENDOSCOPIC, DIAGNOSTIC: ICD-10-PCS | Performed by: SPECIALIST

## 2018-11-05 PROCEDURE — C9113 INJ PANTOPRAZOLE SODIUM, VIA: HCPCS | Performed by: INTERNAL MEDICINE

## 2018-11-05 PROCEDURE — 94760 N-INVAS EAR/PLS OXIMETRY 1: CPT

## 2018-11-05 RX ORDER — MIDAZOLAM HYDROCHLORIDE 1 MG/ML
.25-5 INJECTION, SOLUTION INTRAMUSCULAR; INTRAVENOUS
Status: DISCONTINUED | OUTPATIENT
Start: 2018-11-05 | End: 2018-11-05 | Stop reason: HOSPADM

## 2018-11-05 RX ORDER — FLUMAZENIL 0.1 MG/ML
0.2 INJECTION INTRAVENOUS
Status: DISCONTINUED | OUTPATIENT
Start: 2018-11-05 | End: 2018-11-05 | Stop reason: HOSPADM

## 2018-11-05 RX ORDER — SODIUM CHLORIDE 0.9 % (FLUSH) 0.9 %
5-10 SYRINGE (ML) INJECTION AS NEEDED
Status: ACTIVE | OUTPATIENT
Start: 2018-11-05 | End: 2018-11-05

## 2018-11-05 RX ORDER — PROPOFOL 10 MG/ML
INJECTION, EMULSION INTRAVENOUS AS NEEDED
Status: DISCONTINUED | OUTPATIENT
Start: 2018-11-05 | End: 2018-11-05 | Stop reason: HOSPADM

## 2018-11-05 RX ORDER — SORBITOL SOLUTION 70 %
60 SOLUTION, ORAL MISCELLANEOUS
Status: COMPLETED | OUTPATIENT
Start: 2018-11-05 | End: 2018-11-05

## 2018-11-05 RX ORDER — PHENYLEPHRINE HCL IN 0.9% NACL 0.4MG/10ML
SYRINGE (ML) INTRAVENOUS AS NEEDED
Status: DISCONTINUED | OUTPATIENT
Start: 2018-11-05 | End: 2018-11-05 | Stop reason: HOSPADM

## 2018-11-05 RX ORDER — SODIUM CHLORIDE 0.9 % (FLUSH) 0.9 %
5-10 SYRINGE (ML) INJECTION EVERY 8 HOURS
Status: DISPENSED | OUTPATIENT
Start: 2018-11-05 | End: 2018-11-05

## 2018-11-05 RX ORDER — SODIUM CHLORIDE 9 MG/ML
50 INJECTION, SOLUTION INTRAVENOUS CONTINUOUS
Status: DISPENSED | OUTPATIENT
Start: 2018-11-05 | End: 2018-11-05

## 2018-11-05 RX ORDER — SODIUM CHLORIDE 0.9 % (FLUSH) 0.9 %
5-10 SYRINGE (ML) INJECTION EVERY 8 HOURS
Status: COMPLETED | OUTPATIENT
Start: 2018-11-05 | End: 2018-11-05

## 2018-11-05 RX ORDER — FENTANYL CITRATE 50 UG/ML
50 INJECTION, SOLUTION INTRAMUSCULAR; INTRAVENOUS
Status: DISCONTINUED | OUTPATIENT
Start: 2018-11-05 | End: 2018-11-05 | Stop reason: HOSPADM

## 2018-11-05 RX ORDER — LIDOCAINE HYDROCHLORIDE 20 MG/ML
INJECTION, SOLUTION EPIDURAL; INFILTRATION; INTRACAUDAL; PERINEURAL AS NEEDED
Status: DISCONTINUED | OUTPATIENT
Start: 2018-11-05 | End: 2018-11-05 | Stop reason: HOSPADM

## 2018-11-05 RX ORDER — DEXTROMETHORPHAN/PSEUDOEPHED 2.5-7.5/.8
1.2 DROPS ORAL
Status: DISCONTINUED | OUTPATIENT
Start: 2018-11-05 | End: 2018-11-05 | Stop reason: HOSPADM

## 2018-11-05 RX ORDER — NALOXONE HYDROCHLORIDE 0.4 MG/ML
0.4 INJECTION, SOLUTION INTRAMUSCULAR; INTRAVENOUS; SUBCUTANEOUS
Status: DISCONTINUED | OUTPATIENT
Start: 2018-11-05 | End: 2018-11-05 | Stop reason: HOSPADM

## 2018-11-05 RX ADMIN — PANTOPRAZOLE SODIUM 40 MG: 40 INJECTION, POWDER, FOR SOLUTION INTRAVENOUS at 08:40

## 2018-11-05 RX ADMIN — SODIUM CHLORIDE 50 ML/HR: 900 INJECTION, SOLUTION INTRAVENOUS at 10:53

## 2018-11-05 RX ADMIN — PROPOFOL 120 MG: 10 INJECTION, EMULSION INTRAVENOUS at 11:21

## 2018-11-05 RX ADMIN — LEVOTHYROXINE SODIUM 75 MCG: 75 TABLET ORAL at 08:40

## 2018-11-05 RX ADMIN — Medication 10 ML: at 23:02

## 2018-11-05 RX ADMIN — Medication 120 MCG: at 11:27

## 2018-11-05 RX ADMIN — Medication 10 ML: at 04:51

## 2018-11-05 RX ADMIN — LIDOCAINE HYDROCHLORIDE 80 MG: 20 INJECTION, SOLUTION EPIDURAL; INFILTRATION; INTRACAUDAL; PERINEURAL at 11:10

## 2018-11-05 RX ADMIN — FINASTERIDE 5 MG: 5 TABLET, FILM COATED ORAL at 08:40

## 2018-11-05 RX ADMIN — SORBITOL 60 ML: 258.2 SOLUTION ORAL at 20:42

## 2018-11-05 RX ADMIN — Medication 10 ML: at 14:38

## 2018-11-05 RX ADMIN — SORBITOL 60 ML: 258.2 SOLUTION ORAL at 19:00

## 2018-11-05 RX ADMIN — PANTOPRAZOLE SODIUM 40 MG: 40 INJECTION, POWDER, FOR SOLUTION INTRAVENOUS at 21:00

## 2018-11-05 RX ADMIN — ESCITALOPRAM OXALATE 5 MG: 10 TABLET ORAL at 08:40

## 2018-11-05 RX ADMIN — Medication 10 ML: at 14:39

## 2018-11-05 RX ADMIN — SORBITOL 60 ML: 258.2 SOLUTION ORAL at 17:47

## 2018-11-05 RX ADMIN — ATORVASTATIN CALCIUM 80 MG: 40 TABLET, FILM COATED ORAL at 08:40

## 2018-11-05 NOTE — PROGRESS NOTES
D/W Daughter that given no prior colonoscopy that pt should have colonoscopy prior to resumption of Xarelto. Plan: 
Prep later today with sorbitol for colonoscopy tomorrow,.  
Stay on CLD today then NPO after MN

## 2018-11-05 NOTE — PROGRESS NOTES
Bedside and Verbal shift change report given to Christopher King (oncoming nurse) by Marcos Weathers (offgoing nurse). Report included the following information SBAR, Kardex, Recent Results and Med Rec Status. Zone Phone:  [phone] Significant changes during shift:  Confusion at bedtime Patient Information Chelle Rios 80 y.o. 
11/1/2018  9:46 PM by Tana Stafford MD. Chelle Rios was admitted from Home 
 
Problem List 
 
Patient Active Problem List  
 Diagnosis Date Noted  GI bleed 11/02/2018  Iron deficiency anemia 12/07/2017  Anemia in stage 3 chronic kidney disease ([facility] 12/07/2017  
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  
Diagnosis Date  Anemia  Anxiety  Arrhythmia Afib  Chronic kidney disease  Constipation  Depression  Hypercholesterolemia  Hypertension  NSTEMI (non-ST elevated myocardial infarction) ([facility] [address] 11/21/2016 [facility]:  JACKELIN to SVG-L-PLB and JACKELIN to SVG-LADstents 11/16 Laura.Schwab Pacemaker 2009 Followed By Dr Lane December  S/P CABG x 4 1995  Thyroid disease   
 hypothyroidism Core Measures: CVA: No No 
CHF:No No 
PNA:No No 
 
 
 
Activity Status: OOB to Chair No 
Ambulated this shift Yes Bed Rest No 
 
Supplemental O2: (If Applicable) NC Yes NRB No 
Venti-mask No 
On 1.5 Liters/min LINES AND DRAINS: 
Central Line? No Placement date  Reason Medically Necessary PICC LINE? No Placement date Reason Medically Necessary Urinary Catheter? No Placement Date  Reason Medically Necessary DVT prophylaxis: DVT prophylaxis Med- No 
DVT prophylaxis SCD or HARSHA- Yes Wounds: (If Applicable) Wounds- No 
 
Location Patient Safety: 
 
Falls Score Total Score: 4 Safety Level_______ Bed Alarm On? Yes Sitter?  No 
 
Plan for upcoming shift: try to wean off 02 
 
 
 Discharge Plan: No  
 
Active Consults: 
IP CONSULT TO GASTROENTEROLOGY 
IP CONSULT TO GASTROENTEROLOGY

## 2018-11-05 NOTE — ANESTHESIA POSTPROCEDURE EVALUATION
Procedure(s): ESOPHAGOGASTRODUODENOSCOPY (EGD) ESOPHAGOGASTRODUODENAL (EGD) BIOPSY. Anesthesia Post Evaluation Patient location during evaluation: PACU Note status: Adequate. Level of consciousness: responsive to verbal stimuli and sleepy but conscious Pain management: satisfactory to patient Airway patency: patent Anesthetic complications: no 
Cardiovascular status: acceptable Respiratory status: acceptable Hydration status: acceptable Comments: +Post-Anesthesia Evaluation and Assessment Patient: Wayne Dakins MRN: [id number]  SSN: [id number] YOB: 1923  Age: 80 y.o. Sex: male Cardiovascular Function/Vital Signs /56   Pulse 60   Temp 36.5 °C (97.7 °F)   Resp 24   Ht 5' 11\" (1.803 m)   Wt 68.5 kg (151 lb 0.2 oz)   SpO2 97%   BMI 21.06 kg/m² Patient is status post Procedure(s): ESOPHAGOGASTRODUODENOSCOPY (EGD) ESOPHAGOGASTRODUODENAL (EGD) BIOPSY. Nausea/Vomiting: Controlled. Postoperative hydration reviewed and adequate. Pain: 
Pain Scale 1: Visual (11/05/18 1155) Pain Intensity 1: 0 (11/05/18 1155) Managed. Neurological Status: At baseline. Mental Status and Level of Consciousness: Arousable. Pulmonary Status:  
O2 Device: Room air (11/05/18 1155) Adequate oxygenation and airway patent. Complications related to anesthesia: None Post-anesthesia assessment completed. No concerns. Signed By: Zuhair Grimes MD  
 11/5/2018 Visit Vitals /56 Pulse 60 Temp 36.5 °C (97.7 °F) Resp 24 Ht 5' 11\" (1.803 m) Wt 68.5 kg (151 lb 0.2 oz) SpO2 97% BMI 21.06 kg/m²

## 2018-11-05 NOTE — ROUTINE PROCESS
Adriana Ontiveros 4/24/1923 
[id number] Situation: 
Verbal report received from: [facility] Procedure: Procedure(s): ESOPHAGOGASTRODUODENOSCOPY (EGD) ESOPHAGOGASTRODUODENAL (EGD) BIOPSY Background: 
 
Preoperative diagnosis: Black Stools Postoperative diagnosis: hiatal hernia, gastritis :  Dr. Price Smalls Assistant(s): Endoscopy Technician-1: Sarah Ellis Endoscopy RN-Relief: Stan Gage Specimens:  
ID Type Source Tests Collected by Time Destination 1 : biopsy duodenum Preservative Duodenum  Tianna Petit MD 11/5/2018 1120 Pathology 2 : biopsy gastric antrum Preservative Gastric  Tianna Petit MD 11/5/2018 1121 Pathology H. Pylori  no Assessment: 
Intra-procedure medications Anesthesia gave intra-procedure sedation and medications, see anesthesia flow sheet yes Intravenous fluids: NS@ [place] Body Vital signs stable Abdominal assessment: round and soft Recommendation: 
Discharge patient per MD order. Return to floor Permission to share finding with family or friend yes

## 2018-11-05 NOTE — PROGRESS NOTES
Hospitalist Progress Note NAME: Adriana Ontiveros :  1923 MRN:  [id number] Assessment / Plan: 
Acute blood loss anemia secondary to GI bleed -IV PPI 
-transfuse prn, hgb low but stable 
-EGD showed stomach portion with petechia/erythema c/w gastropathy s/p bx. not clear that the EGD findings are enough to explain melena;  plan for Cscope tomorrow  
-appreciate GI's consultation Acute hypoxic respiratory failure due to acute pulmonary edema, resolved 
-clinically vol overload on  likely due to IVF administration - CXR showed pulmonary edema 
- received IV lasix x2  
-Echo 70%. -stable on RA 
 
CKD 3 
-cr improving 
-hold nephrotoxic agents, repeat bmp Hyperlipidemia 
-continue Lipitor 80 mg daily Hypothyroidism 
-continue levothyroxine 75 mcg daily Atrial fibrillation/CAD 
-Hold Xarelto because of GI bleed Depression 
-Continue home medication Lexapro 5 mg daily 
  
Mild dementia 
-daughter reported mild confusion at baseline 
-avoid sedating meds 
-sitter prn 
  
Code Status: full code Surrogate Decision Maker: daughter   
DVT Prophylaxis: SCDs GI Prophylaxis: not indicated  
Baseline: from [facility] facility Subjective: Chief Complaint / Reason for Physician Visit Pt seen at bedside, post op. No acute issues. He understands about upcoming Cscope. Discussed with RN events overnight. Review of Systems: 
Symptom Y/N Comments  Symptom Y/N Comments Fever/Chills n   Chest Pain n   
Poor Appetite    Edema Cough    Abdominal Pain n   
Sputum    Joint Pain SOB/YOUNG n   Pruritis/Rash Nausea/vomit    Tolerating PT/OT Diarrhea    Tolerating Diet Constipation    Other Could NOT obtain due to:   
 
Objective: VITALS:  
Last 24hrs VS reviewed since prior progress note. Most recent are: 
Patient Vitals for the past 24 hrs: 
 Temp Pulse Resp BP SpO2  
18 1223 97.3 °F (36.3 °C) 62 18 143/60 96 % 18 1210  61 20 120/59 96 % 11/05/18 1205  60 25 121/56 96 % 11/05/18 1200  60 15 123/60 95 % 11/05/18 1155  60 24 117/56 97 % 11/05/18 1150  60 25 120/52 97 % 11/05/18 1146  61 22 119/59 94 % 11/05/18 1140 97.7 °F (36.5 °C) 65 11 130/64 98 % 11/05/18 1134  63 17 157/59 97 % 11/05/18 1045 97.4 °F (36.3 °C) 60 18 112/85   
11/05/18 0743 97.3 °F (36.3 °C) 60 16 149/60 94 % 11/05/18 0413 97.8 °F (36.6 °C) 60 16 137/48 96 % 11/04/18 2338 98.1 °F (36.7 °C) 62 12 138/58 92 % 11/04/18 1940 98 °F (36.7 °C) 81 20 120/50 97 % 11/04/18 1512 97.8 °F (36.6 °C) 62 20 127/69 96 % Intake/Output Summary (Last 24 hours) at 11/5/2018 1351 Last data filed at 11/5/2018 1131 Gross per 24 hour Intake 540 ml Output 950 ml Net -410 ml PHYSICAL EXAM: 
General: WD, WN. Alert, cooperative, no acute distress   
EENT:  EOMI. Anicteric sclerae. MMM Resp:  Crackles, no wheezing. No accessory muscle use CV:  Regular  rhythm,  No edema GI:  Soft, Non distended, Non tender.  +BS Neurologic:  Alert and oriented X 2, normal speech Psych:   Not anxious nor agitated Skin:  No rashes. No jaundice Reviewed most current lab test results and cultures  YES Reviewed most current radiology test results   YES Review and summation of old records today    NO Reviewed patient's current orders and MAR    YES 
PMH/SH reviewed - no change compared to H&P 
________________________________________________________________________ Care Plan discussed with: 
  Comments Patient x Family RN x Care Manager Consultant  x EDMUNDO Multidiciplinary team rounds were held today with , nursing, pharmacist and clinical coordinator. Patient's plan of care was discussed; medications were reviewed and discharge planning was addressed. ________________________________________________________________________ Total NON critical care TIME:  35   Minutes Total CRITICAL CARE TIME Spent:   Minutes non procedure based Comments >50% of visit spent in counseling and coordination of care    
________________________________________________________________________ Domingo Germain MD  
 
Procedures: see electronic medical records for all procedures/Xrays and details which were not copied into this note but were reviewed prior to creation of Plan. LABS: 
I reviewed today's most current labs and imaging studies. Pertinent labs include: 
Recent Labs 11/05/18 0452 11/04/18 
1533 11/04/18 
0501  11/03/18 
0430 WBC 5.4  --  7.1  --  7.6 HGB 8.0*  8.0* 8.1* 7.9*   < > 7.7* HCT 25.5*  25.3* 24.9* 24.8*   < > 23.9*  
*  --  138*  --  146*  
 < > = values in this interval not displayed. Recent Labs 11/05/18 0452 11/04/18 
0501 11/03/18 
0430  141 142  
K 3.5 3.8 3.5 * 108 108 CO2 25 25 25 GLU 82 96 91 BUN 19 23* 20  
CREA 1.22 1.43* 1.41* CA 8.2* 7.9* 8.2* Signed: Domingo Germain MD

## 2018-11-05 NOTE — PROCEDURES
Esophagogastroduodenoscopy Procedure Note      Lakeisha March 4/24/1923 [id number]    Indication:  Iron Deficiency anemia with black stool on xarelto     Endoscopist: Osiris Ramirez MD    Referring Provider:  Sammie Erazo NP    Sedation:  MAC anesthesia Propofol    Procedure Details:  After infomed consent was obtained for the procedure, with all risks and benefits of procedure explained the patient was taken to the endoscopy suite and placed in the left lateral decubitus position. Following sequential administration of sedation as per above, the endoscope was inserted into the mouth and advanced under direct vision to second portion of the duodenum. A careful inspection was made as the gastroscope was withdrawn, including a retroflexed view of the proximal stomach; findings and interventions are described below. Findings:     Esophagus:   Normal esophageal mucosa throughout with Z line normal at 38 cm from incisors. + 4 cm medium sized Hiatal Hernia    Stomach:   + Antrum with focus of superficial petechia/erythema c/w Gastropathy (s/p bx)  No blood in stomach. Normal proximal stomach on retroflexion. Duodenum:   The bulb and post bulbar mucosa is normal in appearance to the second portion. The duodenal folds appeared normal.  Cold forceps biopsies to r/o celiac. Therapies:  As above    Specimen: Specimens were collected as described and send to the laboratory. Complications:   None were encountered during the procedure. EBL:  < 10 ml.           Recommendations:   -not clear that the EGD findings are enough to explain melena;    -will need to d/w daughter POA re: possible colonoscopy      Osiris Ramirez MD  11/5/2018  11:21 AM

## 2018-11-05 NOTE — PROGRESS NOTES
Bedside shift change report given to Choco Patel RN by Kali Darling RN. Report included the following information SBAR, Kardex, ED Summary, Intake/Output, MAR, Recent Results and Cardiac Rhythm Paced.

## 2018-11-05 NOTE — PROGRESS NOTES
TRANSFER - IN REPORT: 
 
Verbal report received from Jerman Rich RN (name) on Abhishek Cue  being received from Neuro room 3110 (unit) for ordered procedure Report consisted of patients Situation, Background, Assessment and  
Recommendations(SBAR). Information from the following report(s) SBAR, Procedure Summary, Intake/Output, MAR, Recent Results and Cardiac Rhythm Paced was reviewed with the receiving nurse. Opportunity for questions and clarification was provided. Will give report to primary Endo nurse upon arrival to unit.

## 2018-11-05 NOTE — PROGRESS NOTES
Neuro CM completed chart review. Pt lives at [facility] in [place] [address]. DTR asked me to call Melony Meyer with [facility] at [phone]. CM called and spoke to her and asked me to fax notes to her at [phone]. She indicated that Pt will probably need [facility] and then possibly transition back to [address] [place] [address]. Pt had Endoscopy today and plan is to have colonoscopy tomorrow. Therapy is scheduled to see Pt this afternoon and make recommendations. CM faxing information as requested to [facility] now. CM will continue to monitor discharge plan. SARAH Urena Ext [phone]

## 2018-11-05 NOTE — PROGRESS NOTES
Physical Therapy Screening: An [facility] screening referral was triggered for physical therapy based on results obtained during the nursing admission assessment. The patients chart was reviewed and the patient is appropriate for a skilled therapy evaluation if there is a decline in functional mobility from baseline. Please order a consult for physical therapy if you are in agreement and would like an evaluation to be completed. Thank you.  
 
Roland Corrigan, PT

## 2018-11-05 NOTE — PROGRESS NOTES
TRANSFER - OUT REPORT: 
 
Verbal report given to Sima(name) on Haroon Amor  being transferred to 3110(unit) for routine progression of care Report consisted of patients Situation, Background, Assessment and  
Recommendations(SBAR). Information from the following report(s) SBAR, MAR and Recent Results was reviewed with the receiving nurse. Lines:  
Peripheral IV 11/02/18 Right Forearm (Active) Site Assessment Clean, dry, & intact 11/5/2018  8:00 AM  
Phlebitis Assessment 0 11/5/2018  8:00 AM  
Infiltration Assessment 0 11/5/2018  8:00 AM  
Dressing Status Clean, dry, & intact 11/5/2018  8:00 AM  
Dressing Type Tape;Transparent 11/5/2018  8:00 AM  
Hub Color/Line Status Pink;Capped 11/5/2018  8:00 AM  
   
Peripheral IV 11/02/18 Left Wrist (Active) Site Assessment Clean, dry, & intact 11/5/2018  8:00 AM  
Phlebitis Assessment 0 11/5/2018  8:00 AM  
Infiltration Assessment 0 11/5/2018  8:00 AM  
Dressing Status Clean, dry, & intact 11/5/2018  8:00 AM  
Dressing Type Tape;Transparent 11/5/2018  8:00 AM  
Hub Color/Line Status Pink;Capped 11/5/2018  8:00 AM  
  
 
Opportunity for questions and clarification was provided.

## 2018-11-05 NOTE — ANESTHESIA PREPROCEDURE EVALUATION
Anesthetic History No history of anesthetic complications Review of Systems / Medical History Patient summary reviewed, nursing notes reviewed and pertinent labs reviewed Pulmonary Within defined limits Neuro/Psych Within defined limits Cardiovascular Within defined limits Hypertension Dysrhythmias Pacemaker and CAD Exercise tolerance: >4 METS 
  
GI/Hepatic/Renal 
Within defined limits Endo/Other Within defined limits Hypothyroidism Other Findings Physical Exam 
 
Airway Mallampati: II 
TM Distance: 4 - 6 cm Neck ROM: normal range of motion Mouth opening: Normal 
 
 Cardiovascular Regular rate and rhythm,  S1 and S2 normal,  no murmur, click, rub, or gallop Dental 
 
Dentition: Edentulous Pulmonary Breath sounds clear to auscultation Abdominal 
GI exam deferred Other Findings Anesthetic Plan ASA: 3 Anesthesia type: general and total IV anesthesia Induction: Intravenous Anesthetic plan and risks discussed with: Patient Propofol MAC

## 2018-11-05 NOTE — PROGRESS NOTES
Anesthesia reports 120mg Propofol, 80mg Lidocaine and,  300mL NS given during procedure. Received report from anesthesia staff on vital signs and status of patient. Endoscope was pre-cleaned at bedside immediately following procedure by Evens GONZALEZ

## 2018-11-06 ENCOUNTER — ANESTHESIA EVENT (OUTPATIENT)
Dept: ENDOSCOPY | Age: 83
DRG: 377 | End: 2018-11-06
Payer: MEDICARE

## 2018-11-06 ENCOUNTER — ANESTHESIA (OUTPATIENT)
Dept: ENDOSCOPY | Age: 83
DRG: 377 | End: 2018-11-06
Payer: MEDICARE

## 2018-11-06 LAB
ANION GAP SERPL CALC-SCNC: 10 MMOL/L (ref 5–15)
BASOPHILS # BLD: 0 K/UL (ref 0–0.1)
BASOPHILS NFR BLD: 0 % (ref 0–1)
BUN SERPL-MCNC: 22 MG/DL (ref 6–20)
BUN/CREAT SERPL: 16 (ref 12–20)
CALCIUM SERPL-MCNC: 9 MG/DL (ref 8.5–10.1)
CHLORIDE SERPL-SCNC: 114 MMOL/L (ref 97–108)
CO2 SERPL-SCNC: 22 MMOL/L (ref 21–32)
CREAT SERPL-MCNC: 1.41 MG/DL (ref 0.7–1.3)
DIFFERENTIAL METHOD BLD: ABNORMAL
EOSINOPHIL # BLD: 0.4 K/UL (ref 0–0.4)
EOSINOPHIL NFR BLD: 7 % (ref 0–7)
ERYTHROCYTE [DISTWIDTH] IN BLOOD BY AUTOMATED COUNT: 14.9 % (ref 11.5–14.5)
GLUCOSE SERPL-MCNC: 110 MG/DL (ref 65–100)
HCT VFR BLD AUTO: 27.4 % (ref 36.6–50.3)
HGB BLD-MCNC: 8.7 G/DL (ref 12.1–17)
IMM GRANULOCYTES # BLD: 0 K/UL (ref 0–0.04)
IMM GRANULOCYTES NFR BLD AUTO: 0 % (ref 0–0.5)
LYMPHOCYTES # BLD: 1.1 K/UL (ref 0.8–3.5)
LYMPHOCYTES NFR BLD: 19 % (ref 12–49)
MCH RBC QN AUTO: 31.3 PG (ref 26–34)
MCHC RBC AUTO-ENTMCNC: 31.8 G/DL (ref 30–36.5)
MCV RBC AUTO: 98.6 FL (ref 80–99)
MONOCYTES # BLD: 0.6 K/UL (ref 0–1)
MONOCYTES NFR BLD: 11 % (ref 5–13)
NEUTS SEG # BLD: 3.8 K/UL (ref 1.8–8)
NEUTS SEG NFR BLD: 63 % (ref 32–75)
NRBC # BLD: 0 K/UL (ref 0–0.01)
NRBC BLD-RTO: 0 PER 100 WBC
PLATELET # BLD AUTO: 160 K/UL (ref 150–400)
PMV BLD AUTO: 10 FL (ref 8.9–12.9)
POTASSIUM SERPL-SCNC: 3.7 MMOL/L (ref 3.5–5.1)
RBC # BLD AUTO: 2.78 M/UL (ref 4.1–5.7)
SODIUM SERPL-SCNC: 146 MMOL/L (ref 136–145)
WBC # BLD AUTO: 6 K/UL (ref 4.1–11.1)

## 2018-11-06 PROCEDURE — 76040000007: Performed by: SPECIALIST

## 2018-11-06 PROCEDURE — G8988 SELF CARE GOAL STATUS: HCPCS | Performed by: OCCUPATIONAL THERAPIST

## 2018-11-06 PROCEDURE — G8979 MOBILITY GOAL STATUS: HCPCS

## 2018-11-06 PROCEDURE — 0DBL8ZZ EXCISION OF TRANSVERSE COLON, VIA NATURAL OR ARTIFICIAL OPENING ENDOSCOPIC: ICD-10-PCS | Performed by: SPECIALIST

## 2018-11-06 PROCEDURE — G8978 MOBILITY CURRENT STATUS: HCPCS

## 2018-11-06 PROCEDURE — 80048 BASIC METABOLIC PNL TOTAL CA: CPT | Performed by: INTERNAL MEDICINE

## 2018-11-06 PROCEDURE — 97530 THERAPEUTIC ACTIVITIES: CPT | Performed by: OCCUPATIONAL THERAPIST

## 2018-11-06 PROCEDURE — 74011250636 HC RX REV CODE- 250/636: Performed by: INTERNAL MEDICINE

## 2018-11-06 PROCEDURE — 97165 OT EVAL LOW COMPLEX 30 MIN: CPT | Performed by: OCCUPATIONAL THERAPIST

## 2018-11-06 PROCEDURE — 36415 COLL VENOUS BLD VENIPUNCTURE: CPT | Performed by: INTERNAL MEDICINE

## 2018-11-06 PROCEDURE — 77030013992 HC SNR POLYP ENDOSC BSC -B: Performed by: SPECIALIST

## 2018-11-06 PROCEDURE — 74011250636 HC RX REV CODE- 250/636: Performed by: SPECIALIST

## 2018-11-06 PROCEDURE — 65660000000 HC RM CCU STEPDOWN

## 2018-11-06 PROCEDURE — 74011250636 HC RX REV CODE- 250/636

## 2018-11-06 PROCEDURE — 97161 PT EVAL LOW COMPLEX 20 MIN: CPT

## 2018-11-06 PROCEDURE — 85025 COMPLETE CBC W/AUTO DIFF WBC: CPT | Performed by: INTERNAL MEDICINE

## 2018-11-06 PROCEDURE — 76060000032 HC ANESTHESIA 0.5 TO 1 HR: Performed by: SPECIALIST

## 2018-11-06 PROCEDURE — G8987 SELF CARE CURRENT STATUS: HCPCS | Performed by: OCCUPATIONAL THERAPIST

## 2018-11-06 PROCEDURE — 88305 TISSUE EXAM BY PATHOLOGIST: CPT

## 2018-11-06 PROCEDURE — 97530 THERAPEUTIC ACTIVITIES: CPT

## 2018-11-06 RX ORDER — MIDAZOLAM HYDROCHLORIDE 1 MG/ML
1-2 INJECTION, SOLUTION INTRAMUSCULAR; INTRAVENOUS
Status: DISCONTINUED | OUTPATIENT
Start: 2018-11-06 | End: 2018-11-06 | Stop reason: HOSPADM

## 2018-11-06 RX ORDER — PANTOPRAZOLE SODIUM 40 MG/1
40 TABLET, DELAYED RELEASE ORAL
Status: DISCONTINUED | OUTPATIENT
Start: 2018-11-07 | End: 2018-11-07 | Stop reason: HOSPADM

## 2018-11-06 RX ORDER — SODIUM CHLORIDE 0.9 % (FLUSH) 0.9 %
5-10 SYRINGE (ML) INJECTION EVERY 8 HOURS
Status: ACTIVE | OUTPATIENT
Start: 2018-11-06 | End: 2018-11-06

## 2018-11-06 RX ORDER — SODIUM CHLORIDE 0.9 % (FLUSH) 0.9 %
5-10 SYRINGE (ML) INJECTION AS NEEDED
Status: ACTIVE | OUTPATIENT
Start: 2018-11-06 | End: 2018-11-06

## 2018-11-06 RX ORDER — PROPOFOL 10 MG/ML
INJECTION, EMULSION INTRAVENOUS AS NEEDED
Status: DISCONTINUED | OUTPATIENT
Start: 2018-11-06 | End: 2018-11-06 | Stop reason: HOSPADM

## 2018-11-06 RX ORDER — LIDOCAINE HYDROCHLORIDE 20 MG/ML
INJECTION, SOLUTION EPIDURAL; INFILTRATION; INTRACAUDAL; PERINEURAL AS NEEDED
Status: DISCONTINUED | OUTPATIENT
Start: 2018-11-06 | End: 2018-11-06 | Stop reason: HOSPADM

## 2018-11-06 RX ORDER — NALOXONE HYDROCHLORIDE 0.4 MG/ML
0.4 INJECTION, SOLUTION INTRAMUSCULAR; INTRAVENOUS; SUBCUTANEOUS
Status: DISCONTINUED | OUTPATIENT
Start: 2018-11-06 | End: 2018-11-06 | Stop reason: HOSPADM

## 2018-11-06 RX ORDER — SODIUM CHLORIDE 9 MG/ML
50 INJECTION, SOLUTION INTRAVENOUS CONTINUOUS
Status: DISCONTINUED | OUTPATIENT
Start: 2018-11-06 | End: 2018-11-06 | Stop reason: HOSPADM

## 2018-11-06 RX ORDER — MIDAZOLAM HYDROCHLORIDE 1 MG/ML
.25-5 INJECTION, SOLUTION INTRAMUSCULAR; INTRAVENOUS
Status: DISCONTINUED | OUTPATIENT
Start: 2018-11-06 | End: 2018-11-06 | Stop reason: HOSPADM

## 2018-11-06 RX ORDER — SODIUM CHLORIDE 0.9 % (FLUSH) 0.9 %
5-10 SYRINGE (ML) INJECTION EVERY 8 HOURS
Status: COMPLETED | OUTPATIENT
Start: 2018-11-06 | End: 2018-11-06

## 2018-11-06 RX ORDER — FENTANYL CITRATE 50 UG/ML
50 INJECTION, SOLUTION INTRAMUSCULAR; INTRAVENOUS
Status: DISCONTINUED | OUTPATIENT
Start: 2018-11-06 | End: 2018-11-06 | Stop reason: HOSPADM

## 2018-11-06 RX ORDER — FLUMAZENIL 0.1 MG/ML
0.2 INJECTION INTRAVENOUS
Status: DISCONTINUED | OUTPATIENT
Start: 2018-11-06 | End: 2018-11-06 | Stop reason: HOSPADM

## 2018-11-06 RX ADMIN — LIDOCAINE HYDROCHLORIDE 40 MG: 20 INJECTION, SOLUTION EPIDURAL; INFILTRATION; INTRACAUDAL; PERINEURAL at 12:00

## 2018-11-06 RX ADMIN — SODIUM CHLORIDE 50 ML/HR: 900 INJECTION, SOLUTION INTRAVENOUS at 11:59

## 2018-11-06 RX ADMIN — SODIUM CHLORIDE 250 ML: 900 INJECTION, SOLUTION INTRAVENOUS at 15:03

## 2018-11-06 RX ADMIN — Medication 10 ML: at 15:04

## 2018-11-06 RX ADMIN — Medication 10 ML: at 21:45

## 2018-11-06 RX ADMIN — Medication 10 ML: at 04:28

## 2018-11-06 RX ADMIN — PROPOFOL 100 MG: 10 INJECTION, EMULSION INTRAVENOUS at 12:28

## 2018-11-06 NOTE — PROGRESS NOTES
Bedside and Verbal shift change report given to Shima 95 (oncoming nurse) by Doretha Doll RN (offgoing nurse). Report included the following information SBAR, Kardex, Recent Results and Med Rec Status. 
  
Zone Phone:  [phone] 
  
  
Significant changes during shift:  none 
  
  
  
Patient Information 
  
Lakeisha March 80 y.o. 
11/1/2018  9:46 PM by Aydee Plaza MD. Lakeisha March was admitted from Home 
  
Problem List 
  
    
Patient Active Problem List  
  Diagnosis Date Noted  GI bleed 11/02/2018  Iron deficiency anemia 12/07/2017  Anemia in stage 3 chronic kidney disease ([facility] 12/07/2017  
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  
  
    
Past Medical History:  
Diagnosis Date  Anemia    
 Anxiety    
 Arrhythmia    
  Afib  Chronic kidney disease    
 Constipation    
 Depression    
 Hypercholesterolemia    
 Hypertension    
 NSTEMI (non-ST elevated myocardial infarction) ([facility] 11/21/2016  
  [facility]:  JACKELIN to SVG-L-PLB and JACKELIN to SVG-LADstents 11/16  Pacemaker 2009  
  Followed By Dr Beatrice Miguel  S/P CABG x 4 1995  Thyroid disease    
  hypothyroidism  
  
  
  
Core Measures: 
  
CVA: No No 
CHF:No No 
PNA:No No 
  
  
  
Activity Status: 
  
OOB to Chair No 
Ambulated this shift Yes Bed Rest No 
  
Supplemental O2: (If Applicable) 
  
NC Yes NRB No 
Venti-mask No 
On 1.5 Liters/min 
  
  
LINES AND DRAINS: 
  
Central Line? No   
PICC LINE? No   
Urinary Catheter? No   
DVT prophylaxis: 
  
DVT prophylaxis Med- No 
DVT prophylaxis SCD or HARSHA- Yes  
  
Wounds: (If Applicable) 
  
Wounds- No 
  
Location  
  
Patient Safety: 
  
Falls Score Total Score: 4 Safety Level_______ Bed Alarm On? Yes Sitter? No 
  
Plan for upcoming shift: safety, prep for colonoscopy, NPO past MN  
  
  
Discharge Plan: yes TBD 
  
Active Consults: IP CONSULT TO GASTROENTEROLOGY 
IP CONSULT TO GASTROENTEROLOGY

## 2018-11-06 NOTE — PROGRESS NOTES
TRANSFER - OUT REPORT: 
 
Verbal report given to Michael RN(name) on Kristin De Santiago  being transferred to [facility](unit) for routine progression of care Report consisted of patients Situation, Background, Assessment and  
Recommendations(SBAR). Information from the following report(s) Procedure Summary, MAR and Cardiac Rhythm PACED was reviewed with the receiving nurse. Lines:  
Peripheral IV 11/02/18 Right Forearm (Active) Site Assessment Clean, dry, & intact 11/6/2018 11:57 AM  
Phlebitis Assessment 0 11/6/2018 11:57 AM  
Infiltration Assessment 0 11/6/2018 11:57 AM  
Dressing Status Clean, dry, & intact 11/6/2018 11:57 AM  
Dressing Type Transparent;Tape 11/6/2018 11:57 AM  
Hub Color/Line Status Pink;Flushed 11/6/2018 11:57 AM  
   
Peripheral IV 11/02/18 Left Wrist (Active) Site Assessment Clean, dry, & intact 11/6/2018 11:40 AM  
Phlebitis Assessment 0 11/6/2018 11:40 AM  
Infiltration Assessment 0 11/6/2018 11:40 AM  
Dressing Status Clean, dry, & intact 11/6/2018 11:40 AM  
Dressing Type Transparent 11/6/2018 11:40 AM  
Hub Color/Line Status Pink 11/6/2018 11:40 AM  
  
 
Opportunity for questions and clarification was provided.

## 2018-11-06 NOTE — ROUTINE PROCESS
Tai Fowler 4/24/1923 
[id number] Situation: 
Verbal report received from: Zak Zhang Procedure: Procedure(s): 
COLONOSCOPY 
ENDOSCOPIC POLYPECTOMY Background: 
 
Preoperative diagnosis: gi bleed Postoperative diagnosis: Diverticulosis, Internal Hemorrhoids, Polyp :  Dr. Alyse Diaz Assistant(s): Endoscopy Technician-1: Criselda Banks 
Endoscopy RN-1: Nicolas Hobbs Specimens:  
ID Type Source Tests Collected by Time Destination 1 : Transverse Colon Polyp Preservative Colon, Transverse  Mara Chavarria MD 11/6/2018 1223 Pathology H. Pylori  no Assessment: 
Intra-procedure medications Anesthesia gave intra-procedure sedation and medications, see anesthesia flow sheet yes Intravenous fluids: NS@ Lucianne Glatter Vital signs stable Abdominal assessment: round and soft Recommendation: 
Discharge patient per MD order. Return to floor Room 3110 Family or [place] daughter Permission to share finding with family or friend yes

## 2018-11-06 NOTE — PROGRESS NOTES
CM received call from [facility] with [facility] requesting updates on pt. Updates on pt faxed yesterday by Beatrice Hanson CM. PT has not seen pt yet to determine if pt needs SNF stay at [facility]. CM will call [facility] ([phone]) with pt updates when received. Candice Mae, [address]

## 2018-11-06 NOTE — PROGRESS NOTES
Occupational Therapy Pt has been off the floor for testing today. (recently a colonoscopy) Pt is currently eating his lunch after being NPO, will defer and continue to follow. Nursing reports that pt had a bolus earlier for blood pressure support (low BP).

## 2018-11-06 NOTE — PROGRESS NOTES
Physical Therapy Goals Initiated 11/6/2018 1. Patient will move from supine to sit and sit to supine , scoot up and down and roll side to side in bed with independence within 7 day(s). 2. Patient will transfer from bed to chair and chair to bed with independence using the least restrictive device within 7 day(s). 3. Patient will perform sit to stand with independence within 7 day(s). 4. Patient will ambulate with modified independence for 225 feet with the least restrictive device within 7 day(s). 5. Pt will complete Five Times sit to stand or other comparable functional measure to determine level of impairment at next session, then will demonstrate improvement greater than or equal to the MCID. physical Therapy EVALUATION Patient: Robert Navarrete (78 y.o. male) Date: 11/6/2018 Primary Diagnosis: GI bleed Procedure(s) (LRB): 
COLONOSCOPY (N/A) ENDOSCOPIC POLYPECTOMY (N/A) Day of Surgery Precautions:   Fall ASSESSMENT : 
Based on the objective data described below, the patient presents with orthostatic hypotension, decreased strength, impaired balance, and gait abnormalities described below. Pt presented supine, pleasant and agreeable to therapy. Pt history and PLOF was recounted as noted below. Pt able to perform bed mobility with S-CGA, needing CGA to come fully upright sitting EOB. Pt with rash on back, RN aware and applied cream. Pt able to perform sit/stand transfer with CGA, then amb with RW and CGA. After about 15 ft pt with increased lightheadedness and SOB. Pt sat EOB with BP checked as noted: 
 
Vitals:  
 11/06/18 1400 11/06/18 1445 11/06/18 1446 11/06/18 1603 BP: 121/52 (!) 83/35 112/47 122/45 BP 1 Location: Left arm Left arm Left arm Left arm BP Patient Position: Sitting;Post activity Standing Sitting Head of bed elevated (Comment degrees) Comment: HOB 45 degrees Pulse:    61 Resp:    18 Temp:    97.8 °F (36.6 °C) SpO2:    99% Weight:      
Height: With seated rest, pt reporting decrease in symptoms. Bp checked in standing again with concerning drop, but then stable with seated recovery. Pt requesting to use bathroom, pt able to transfer to [facility] with CGA. When performing stand pivot transfer without use of RW for support, pt reaching for hand held supports. Pt instructed to not bear down with bowel movements, and able to wipe independently. Pt then able to transfer back to bed, bed placed in chair position. Pt requesting to use bedpan, able to bridge for placement. Pt left sitting on bedpan to end session, RN aware. At discharge it is recommended that pt return to SNF due to increased need for medical management and current impairments described above. Patient will benefit from skilled intervention to address the above impairments. Patients rehabilitation potential is considered to be Good Factors which may influence rehabilitation potential include:  
[]         None noted 
[]         Mental ability/status [x]         Medical condition 
[]         Home/family situation and support systems 
[]         Safety awareness 
[]         Pain tolerance/management 
[]         Other: PLAN : 
Recommendations and Planned Interventions: 
[x]           Bed Mobility Training             []    Neuromuscular Re-Education 
[x]           Transfer Training                   []    Orthotic/Prosthetic Training 
[x]           Gait Training                         []    Modalities [x]           Therapeutic Exercises           []    Edema Management/Control 
[x]           Therapeutic Activities            [x]    Patient and Family Training/Education 
[]           Other (comment): Frequency/Duration: Patient will be followed by physical therapy  3 times a week to address goals. Discharge Recommendations: Basil Tariq Further Equipment Recommendations for Discharge: None, pt already owns SUBJECTIVE:  
 Patient stated I've never got a hole in one, but I have gotten two eagles.  OBJECTIVE DATA SUMMARY:  
HISTORY:   
Past Medical History:  
Diagnosis Date  Anemia  Anxiety  Arrhythmia Afib  Chronic kidney disease  Constipation  Depression  Hypercholesterolemia  Hypertension  NSTEMI (non-ST elevated myocardial infarction) ([facility] [address] 11/21/2016 [facility]:  JACKELIN to SVG-L-PLB and JACKELIN to SVG-LADstents 11/16 [facility] Pacemaker 2009 Followed By Dr Estiven Becerra  S/P CABG x 4 1995  Thyroid disease   
 hypothyroidism Past Surgical History:  
Procedure Laterality Date  CARDIAC SURG PROCEDURE UNLIST    
 pacer  CARDIAC SURG PROCEDURE UNLIST  07/20/1995  
 quadruple bypass [address]  
 4V  HX PACEMAKER  04/23/2003 Prior Level of Function/Home Situation: PTA pt living at [facility] at assisted living. Pt independent-mod I in amb with occasional use of quad cane for household distances, and use of rollator for longer distances. Pt is independent in ADLs including bathing, dressing, and feeding. Pt does report that he takes extra care when bathing to prevent a fall from occurring. Pt regularly attending strength and balance class 3x per week through activity center. Pt also enjoys golfing, going several times per month pending good weather Personal factors and/or comorbidities impacting plan of care: Anemia, Afib, Dementia, CABGx4, [place], NSTEMI, CKD Home Situation Home Environment: Assisted living Care Facility Name: [facility] # Steps to Enter: 0 One/Two Story Residence: One story Living Alone: No 
Support Systems: Child(maria ines) Patient Expects to be Discharged to[de-identified] Assisted living Current DME Used/Available at Home: [place], rollator, Grab bars, Shower chair Tub or Shower Type: Shower EXAMINATION/PRESENTATION/DECISION MAKING: Critical Behavior: 
  
Orientation Level: Oriented X4 Hearing: Auditory Auditory Impairment: Hard of hearing, bilateral 
Range Of Motion: 
AROM: Generally decreased, functional 
  
  
  
  
  
  
  
Strength:   
Strength: Generally decreased, functional 
  
  
  
  
  
  
Vision:  
Corrective Lenses: Glasses Functional Mobility: 
Bed Mobility: 
Rolling: Supervision Supine to Sit: Contact guard assistance Sit to Supine: Supervision Scooting: Supervision Transfers: 
Sit to Stand: Contact guard assistance Stand to Sit: Contact guard assistance Balance:  
Sitting: Intact Standing: Impaired Standing - Static: Fair Standing - Dynamic : Fair Ambulation/Gait Training: 
Distance (ft): 15 Feet (ft) Assistive Device: Walker, rolling;Gait belt Ambulation - Level of Assistance: Contact guard assistance Gait Description (WDL): Exceptions to [facility] Gait Abnormalities: Decreased step clearance Base of Support: Widened Speed/Kristin: Slow Step Length: Left shortened;Right shortened Functional Measure: 
Barthel Index: 
 
Bathin Bladder: 10 Bowels: 10 
Groomin Dressing: 10 Feeding: 10 Mobility: 0 Stairs: 0 Toilet Use: 5 Transfer (Bed to Chair and Back): 10 Total: 60 Barthel and G-code impairment scale: 
Percentage of impairment CH 
0% CI 
1-19% CJ 
20-39% CK 
40-59% CL 
60-79% CM 
80-99% CN 
100% Barthel Score 0-100 100 99-80 79-60 59-40 20-39 1-19 
 0 Barthel Score 0-20 20 17-19 13-16 9-12 5-8 1-4 0 The Barthel ADL Index: Guidelines 1. The index should be used as a record of what a patient does, not as a record of what a patient could do. 2. The main aim is to establish degree of independence from any help, physical or verbal, however minor and for whatever reason. 3. The need for supervision renders the patient not independent. 4. A patient's performance should be established using the best available evidence.  Asking the patient, friends/relatives and nurses are the usual sources, but direct observation and common sense are also important. However direct testing is not needed. 5. Usually the patient's performance over the preceding 24-48 hours is important, but occasionally longer periods will be relevant. 6. Middle categories imply that the patient supplies over 50 per cent of the effort. 7. Use of aids to be independent is allowed. Dedra Holbrook., Barthel, D.W. (9823). Functional evaluation: the Barthel Index. [address] [facility] (14)2. COLBY Guevara, Anton Ulloa., Fauzia Galan., [place], [address] (1999). Measuring the change indisability after inpatient rehabilitation; comparison of the responsiveness of the Barthel Index and Functional [place] Measure. Journal of Neurology, Neurosurgery, and Psychiatry, 66(4), 848-495. JOVON Kingsley, GRACY Correia, & Yazmin Tanner M.A. (2004.) Assessment of post-stroke quality of life in cost-effectiveness studies: The usefulness of the Barthel Index and the EuroQoL-5D. [facility], 13, 208-24 G codes: In compliance with CMSs Claims Based Outcome Reporting, the following G-code set was chosen for this patient based on their primary functional limitation being treated: The outcome measure chosen to determine the severity of the functional limitation was the barthel with a score of 60/100 which was correlated with the impairment scale. ? Mobility - Walking and Moving Around:  
  - CURRENT STATUS: CJ - 20%-39% impaired, limited or restricted  - GOAL STATUS: CI - 1%-19% impaired, limited or restricted  - D/C STATUS:  ---------------To be determined--------------- Activity Tolerance: Pt with poor activity tolerance, limited by orthostatic hypotension with increased SOB and significant drop in BP Please refer to the flowsheet for vital signs taken during this treatment. After treatment:  
[]         Patient left in no apparent distress sitting up in chair [x]         Patient left in no apparent distress in bed 
[x]         Call bell left within reach [x]         Nursing notified 
[x]         Caregiver present [x]         Bed alarm activated COMMUNICATION/EDUCATION:  
The patients plan of care was discussed with: Registered Nurse. [x]         Fall prevention education was provided and the patient/caregiver indicated understanding. [x]         Patient/family have participated as able in goal setting and plan of care. [x]         Patient/family agree to work toward stated goals and plan of care. []         Patient understands intent and goals of therapy, but is neutral about his/her participation. []         Patient is unable to participate in goal setting and plan of care. Thank you for this referral. 
Yohannes Marrero, SPT Time Calculation: 42 mins Regarding student involvement in patient care: A student participated in this treatment session. Per CMS Medicare statements and APTA guidelines I certify that the following was true: 1. I was present and directly observed the entire session. 2. I made all skilled judgments and clinical decisions regarding care. 3. I am the practitioner responsible for assessment, treatment, and documentation.

## 2018-11-06 NOTE — PROGRESS NOTES
Bedside and Verbal shift change report given to Dawn (oncoming nurse) by Cole Edwards (offgoing nurse). Report included the following information SBAR, Kardex, Recent Results and Med Rec Status. Zone Phone:  [phone] Significant changes during shift:  Confusion at bedtime Patient Information Sarina Sahu 80 y.o. 
11/1/2018  9:46 PM by Serjio Rios MD. Sarina Sahu was admitted from Home 
 
Problem List 
 
Patient Active Problem List  
 Diagnosis Date Noted  GI bleed 11/02/2018  Iron deficiency anemia 12/07/2017  Anemia in stage 3 chronic kidney disease ([facility] [address] 12/07/2017  
 NSTEMI (non-ST elevated myocardial infarction) ([facility] [address] 11/21/2016  Dementia 05/06/2015  Atrial fibrillation ([facility] [address] 05/06/2015  Pacemaker 01/01/2009  S/P CABG x 4 01/01/1995 Past Medical History:  
Diagnosis Date  Anemia  Anxiety  Arrhythmia Afib  Chronic kidney disease  Constipation  Depression  Hypercholesterolemia  Hypertension  NSTEMI (non-ST elevated myocardial infarction) ([facility] [address] 11/21/2016 [facility]:  JACKELIN to SVG-L-PLB and JACKELIN to SVG-LADstents 11/16 [address] [place] [address] Pacemaker 2009 Followed By Dr Vern De La Cruz  S/P CABG x 4 1995  Thyroid disease   
 hypothyroidism Core Measures: CVA: No No 
CHF:No No 
PNA:No No 
 
 
 
Activity Status: OOB to Chair No 
Ambulated this shift Yes Bed Rest No 
 
Supplemental O2: (If Applicable) NC Yes NRB No 
Venti-mask No 
On 1.5 Liters/min LINES AND DRAINS: 
Central Line? No Placement date  Reason Medically Necessary PICC LINE? No Placement date Reason Medically Necessary Urinary Catheter? No Placement Date  Reason Medically Necessary DVT prophylaxis: DVT prophylaxis Med- No 
DVT prophylaxis SCD or HARSHA- Yes Wounds: (If Applicable) Wounds- No 
 
Location Patient Safety: 
 
Falls Score Total Score: 4 Safety Level_______ Bed Alarm On? Yes Sitter?  No 
 
Plan for upcoming shift: try to wean off 02 
 
 
 Discharge Plan: No  
 
Active Consults: 
IP CONSULT TO GASTROENTEROLOGY 
IP CONSULT TO GASTROENTEROLOGY

## 2018-11-06 NOTE — PROCEDURES
Colonoscopy Procedure Note    Indications:   Acute GI Bleeding    Referring Physician: Darleen Zarate NP  Anesthesia/Sedation: MAC anesthesia Propofol  Endoscopist:  Dr. Miguel Angel Castaneda    Procedure in Detail:  Informed consent was obtained for the procedure, including sedation. Risks of perforation, hemorrhage, adverse drug reaction, and aspiration were discussed. The patient was placed in the left lateral decubitus position. Based on the pre-procedure assessment, including review of the patient's medical history, medications, allergies, and review of systems, he had been deemed to be an appropriate candidate for moderate sedation; he was therefore sedated with the medications listed above. The patient was monitored continuously with ECG tracing, pulse oximetry, blood pressure monitoring, and direct observations. A rectal examination was performed. The [id number] was inserted into the rectum and advanced under direct vision to the cecum, which was identified by the ileocecal valve and appendiceal orifice. The quality of the colonic preparation was adequate. A careful inspection was made as the colonoscope was withdrawn, including a retroflexed view of the rectum; findings and interventions are described below. Appropriate photodocumentation was obtained. Findings:     1. Scope advanced to the cecum. 2.  There was diffuse wide mouthed diverticulosis throughout the sigmoid, descending and some in the transverse colon. 3.  5 mm polyp in the transverse colon s/p cold snare removal  4. Some barotrauma seen in the cecum but was mild. 5.  Grade 1 internal hemorrhoids. Therapies:  See above    Specimen: Specimens were collected as described above and sent to pathology. Complications: None were encountered during the procedure. EBL: < 10 ml.     Recommendations:   -f/u polyp path  -advance diet  -rebleeding risk on xarelto is significant therefore consider other alternatives  Signed By: Jun Greer MD                        November 6, 2018

## 2018-11-06 NOTE — PROGRESS NOTES
Bedside and Verbal shift change report given to dong (oncoming nurse) by Jung Martin (offgoing nurse). Report included the following information SBAR, Kardex, Recent Results and Med Rec Status. 
  
Zone Phone:  [phone] 
  
  
Significant changes during shift:  Confusion at bedtime 
  
  
  
Patient Information 
  
Donald Peck 80 y.o. 
11/1/2018  9:46 PM by Selena Jones MD. Donald Peck was admitted from Home 
  
Problem List 
  
    
Patient Active Problem List  
  Diagnosis Date Noted  GI bleed 11/02/2018  Iron deficiency anemia 12/07/2017  Anemia in stage 3 chronic kidney disease ([facility] [address] 12/07/2017  
 [facility] 11/21/2016  Dementia 05/06/2015  Atrial fibrillation ([facility] 05/06/2015  Pacemaker 01/01/2009  S/P CABG x 4 01/01/1995  
  
    
Past Medical History:  
Diagnosis Date  Anemia    
 Anxiety    
 Arrhythmia    
  Afib  Chronic kidney disease    
 Constipation    
 Depression    
 Hypercholesterolemia    
 Hypertension    
 [facility]STEM[facility]non-ST [facility] 11/21/2016  
  [facility]:  JACKELIN to SVG-L-PLB and JACKELIN to SVG-LADstents 11/16  Pacemaker 2009  
  Followed By Dr Beto King  S/P CABG x 4 1995  Thyroid disease    
  hypothyroidism  
  
  
  
Core Measures: 
  
CVA: No No 
CHF:No No 
PNA:No No 
  
  
  
Activity Status: 
  
OOB to Chair No 
Ambulated this shift Yes Bed Rest No 
  
Supplemental O2: (If Applicable) 
  
NC Yes NRB No 
Venti-mask No 
On 1.5 Liters/min 
  
  
LINES AND DRAINS: 
  
Central Line? No Placement date  Reason Medically Necessary  
  
PICC LINE? No Placement date Reason Medically Necessary  
  
Urinary Catheter? No Placement Date  Reason Medically Necessary  
  
DVT prophylaxis: 
  
DVT prophylaxis Med- No 
DVT prophylaxis SCD or HARSHA- Yes  
  
Wounds: (If Applicable) 
  
Wounds- No 
  
Location  
  
Patient Safety: 
  
Falls Score Total Score: 4 Safety Level_______ Bed Alarm On?  Yes 
 Sitter? No 
  
Plan for upcoming shift: try to wean off 02 
  
  
  
Discharge Plan: No  
  
Active Consults: 
IP CONSULT TO GASTROENTEROLOGY 
IP CONSULT TO GASTROENTEROLOGY

## 2018-11-06 NOTE — PROGRESS NOTES
Problem: Self Care Deficits Care Plan (Adult) Goal: *Acute Goals and Plan of Care (Insert Text) Occupational Therapy Goals Initiated 11/6/2018 1. Patient will perform grooming in standing with supervision/set-up within 7 day(s). 2.  Patient will perform bathing with minimal assistance/contact guard assist within 7 day(s). 3.  Patient will perform upper body dressing and lower body dressing with supervision/set-up within 7 day(s). 4.  Patient will walk into the bathroom to perform toilet transfers with supervision/set-up within 7 day(s). 5.  Patient will perform all aspects of toileting with supervision/set-up within 7 day(s). 6.  Patient will participate in upper extremity therapeutic exercise/activities with supervision/set-up for 10 minutes within 7 day(s). Occupational Therapy EVALUATION Patient: Rfaa Correa (59 y.o. male) Date: 11/6/2018 Primary Diagnosis: GI bleed Procedure(s) (LRB): 
COLONOSCOPY (N/A) ENDOSCOPIC POLYPECTOMY (N/A) Day of Surgery Precautions:   Fall ASSESSMENT : 
Based on the objective data described below, the patient presents with generalized weakness and fatigue. He was agreeable to OT and did admit to feeling weaker than usual after being in bed for several days. Pt is functioning below his reported independent baseline at set up to moderate assistance levels for self care. Pt demonstrated limited tolerance for functional mobility and was able to stand and side step up to the [facility] with minimal assistance with HHA. Pt has been functioning in his apt independently and would not benefit from increased assistance for adls, IADLs, and mobiltiy. He would likely need to go to the Healthcare section for short term rehab to maximize independence and safety prior to returning home . Patient will benefit from skilled intervention to address the above impairments. Patients rehabilitation potential is considered to be Good Factors which may influence rehabilitation potential include:  
[]             None noted []             Mental ability/status [x]             Medical condition []             Home/family situation and support systems []             Safety awareness []             Pain tolerance/management 
[]             Other: PLAN : 
Recommendations and Planned Interventions: 
[x]               Self Care Training                  [x]        Therapeutic Activities [x]               Functional Mobility Training    []        Cognitive Retraining 
[x]               Therapeutic Exercises           [x]        Endurance Activities [x]               Balance Training                   [x]        Neuromuscular Re-Education []               Visual/Perceptual Training     [x]   Home Safety Training 
[x]               Patient Education                 [x]        Family Training/Education []               Other (comment): Frequency/Duration: Patient will be followed by occupational therapy 4 times a week to address goals. Discharge Recommendations: [facility] for short term rehab Further Equipment Recommendations for Discharge: tbd, may benefit from a reacher, LIZZIE--TBD in rehab setting. SUBJECTIVE:  
Patient stated Guinevere Appl you scratch my back.  
(pt has bumpy red rash on back) OBJECTIVE DATA SUMMARY:  
HISTORY:  
Past Medical History:  
Diagnosis Date  Anemia  Anxiety  Arrhythmia Afib  Chronic kidney disease  Constipation  Depression  Hypercholesterolemia  Hypertension  NSTEMI (non-ST elevated myocardial infarction) ([facility], [place] [address] 11/21/2016 [facility]:  JACKELIN to SVG-L-PLB and JACKELIN to SVG-LADstents 11/16 [place].Darlene Pacemaker 2009 Followed By Dr Brissa Gilbert  S/P CABG x 4 1995  Thyroid disease   
 hypothyroidism Past Surgical History:  
Procedure Laterality Date  CARDIAC SURG PROCEDURE UNLIST    
 pacer  CARDIAC SURG PROCEDURE UNLIST  07/20/1995  
 quadruple bypass [address]  
 4V  HX PACEMAKER  04/23/2003 Prior Level of Function/Environment/Context: Pt lives in [place] and has been independent in [place] [address], his daughter assists him with laundry. Pt uses a rollator for moving laundry, otherwise generally a cane in his apt. Pt fell over a year ago by tripping on a cord Occupations in which the patient is/was successful, what are the barriers preventing that success: medical condition/weakness Performance Patterns (routines, roles, habits, and rituals):  
Personal Interests and/or values:  
Expanded or extensive additional review of patient history:  
 
Home Situation Home Environment: Assisted living Care [facility] # Steps to Enter: 0 One/Two Story Residence: One story Living Alone: No 
Support Systems: Child(maria ines) Patient Expects to be Discharged to[de-identified] Assisted living Current DME Used/Available at Home: Scharlene Minors, rollator, Grab bars, Shower chair, Anneliese beach, quad Tub or Shower Type: Shower Hand dominance: RightEXAMINATION OF PERFORMANCE DEFICITS: 
Cognitive/Behavioral Status: 
Neurologic State: Alert Orientation Level: Oriented to person;Oriented to place;Oriented to situation Cognition: Follows commands Perception: Appears intact Perseveration: No perseveration noted Safety/Judgement: Awareness of environment; Fall prevention Skin: fragile appearing, red bumpy rash on back. Suggested that his daughter bring in clothing to have his usual fabric next to his skin Edema: none observed Hearing: Auditory Auditory Impairment: Hard of hearing, bilateral 
Vision/Perceptual:   
Tracking: (limited scanning of environment due to pt reported fatigue) Corrective Lenses: Glasses Range of Motion: BUEs:   
AROM: Generally decreased, functional 
  
  
  
  
  
  
  
Strength: BUEs:   
Strength: Generally decreased, functional( strength is equal bilaterally) Coordination: Fine Motor Skills-Upper: Left Intact; Right Intact Gross Motor Skills-Upper: Left Intact; Right Intact Tone & Sensation: Tone is normal 
Sensation is intact Balance: 
Sitting: Intact Standing: Impaired Standing - Static: Constant support; Fair 
Standing - Dynamic : Fair(needs support in [facility] for side step to [facility]) Functional Mobility and Transfers for ADLs:Bed Mobility: 
Rolling: Supervision Supine to Sit: Supervision;Stand-by assistance Sit to Supine: Supervision;Stand-by assistance Scooting: Supervision Transfers: 
Sit to Stand: Contact guard assistance Stand to Sit: Contact guard assistance Bathroom Mobility: (declined due to fatigue) Toilet Transfer : (used bed pan with moderate assist) ADL Assessment: 
Feeding: Independent;Setup Oral Facial Hygiene/Grooming: Setup Bathing: Moderate assistance Upper Body Dressing: Minimum assistance Lower Body Dressing: Minimum assistance Toileting: Moderate assistance(using bed pan assisted with placing -requested bed pan) ADL Intervention and task modifications: 
  
 
  
 
  
 
  
 
  
 
  
 
  
 
Cognitive Retraining Safety/Judgement: Awareness of environment; Fall prevention Therapeutic Exercise: 
Encouraged OOB mobility for bed Functional Measure: 
Barthel Index: 
 
Bathin Bladder: 10 Bowels: 10 
Groomin Dressing: 10 Feeding: 10 Mobility: 0 Stairs: 0 Toilet Use: 5 Transfer (Bed to Chair and Back): 10 Total: 60 Barthel and G-code impairment scale: 
Percentage of impairment CH 
0% CI 
1-19% CJ 
20-39% CK 
40-59% CL 
60-79% CM 
80-99% CN 
100% Barthel Score 0-100 100 99-80 79-60 59-40 20-39 1-19 
 0 Barthel Score 0-20 20 17-19 13-16 9-12 5-8 1-4 0 The Barthel ADL Index: Guidelines 1. The index should be used as a record of what a patient does, not as a record of what a patient could do.  
2. The main aim is to establish degree of independence from any help, physical or verbal, however minor and for whatever reason. 3. The need for supervision renders the patient not independent. 4. A patient's performance should be established using the best available evidence. Asking the patient, friends/relatives and nurses are the usual sources, but direct observation and common sense are also important. However direct testing is not needed. 5. Usually the patient's performance over the preceding 24-48 hours is important, but occasionally longer periods will be relevant. 6. Middle categories imply that the patient supplies over 50 per cent of the effort. 7. Use of aids to be independent is allowed. Mc Rios., Barthel, D.WScot (2398). Functional evaluation: the Barthel Index. [address] [facility] (14)2. Margarita Henry kimberley COLBY Macedo, Opal Miles., Shantal Salomon., [place], [address] [place] [address] (1999). Measuring the change indisability after inpatient rehabilitation; comparison of the responsiveness of the Barthel Index and Functional [place] Measure. Journal of Neurology, Neurosurgery, and Psychiatry, 66(4), 105-304. JOVON Jeffery, GRACY Correia, & Ander Turpin, MScotA. (2004.) Assessment of post-stroke quality of life in cost-effectiveness studies: The usefulness of the Barthel Index and the EuroQoL-5D. [facility], 13, 698-75 G codes: In compliance with CMSs Claims Based Outcome Reporting, the following G-code set was chosen for this patient based on their primary functional limitation being treated: The outcome measure chosen to determine the severity of the functional limitation was the BArthel Index with a score of 60/100 which was correlated with the impairment scale. ? Self Care:  
  - CURRENT STATUS: CJ - 20%-39% impaired, limited or restricted  - GOAL STATUS: CI - 1%-19% impaired, limited or restricted  - D/C STATUS:  ---------------To be determined--------------- Occupational Therapy Evaluation Charge Determination History Examination Decision-Making LOW Complexity : Brief history review  MEDIUM Complexity : 3-5 performance deficits relating to physical, cognitive , or psychosocial skils that result in activity limitations and / or participation restrictions MEDIUM Complexity : Patient may present with comorbidities that affect occupational performnce. Miniml to moderate modification of tasks or assistance (eg, physical or verbal ) with assesment(s) is necessary to enable patient to complete evaluation Based on the above components, the patient evaluation is determined to be of the following complexity level: LOW Pain: 
Pain Scale 1: Numeric (0 - 10) Pain Intensity 1: 0 Activity Tolerance:  
Fair, pt fatigued with poor endurance Please refer to the flowsheet for vital signs taken during this treatment. After treatment:  
[] Patient left in no apparent distress sitting up in chair 
[x] Patient left in no apparent distress in bed 
[x] Call bell left within reach [x] Nursing notified 
[] Caregiver present 
[] Bed alarm activated COMMUNICATION/EDUCATION:  
The patients plan of care was discussed with: Registered Nurse. 
[] Home safety education was provided and the patient/caregiver indicated understanding. [x] Patient/family have participated as able in goal setting and plan of care. [] Patient/family agree to work toward stated goals and plan of care. [] Patient understands intent and goals of therapy, but is neutral about his/her participation. [] Patient is unable to participate in goal setting and plan of care. This patients plan of care is appropriate for delegation to [facility]. Thank you for this referral. 
Moody Plata OTR/L Time Calculation: 29 mins

## 2018-11-06 NOTE — ANESTHESIA PREPROCEDURE EVALUATION
Anesthetic History No history of anesthetic complications Review of Systems / Medical History Patient summary reviewed, nursing notes reviewed and pertinent labs reviewed Pulmonary Within defined limits Neuro/Psych Psychiatric history Cardiovascular Within defined limits Hypertension Dysrhythmias Pacemaker and CAD Exercise tolerance: >4 METS Comments: 11-3-18 ECHO: 70% EF  
GI/Hepatic/Renal 
Within defined limits Endo/Other Within defined limits Hypothyroidism Other Findings Physical Exam 
 
Airway Mallampati: II 
TM Distance: 4 - 6 cm Neck ROM: normal range of motion Mouth opening: Normal 
 
 Cardiovascular Regular rate and rhythm,  S1 and S2 normal,  no murmur, click, rub, or gallop Dental 
 
Dentition: Edentulous Pulmonary Breath sounds clear to auscultation Abdominal 
GI exam deferred Other Findings Anesthetic Plan ASA: 3 Anesthesia type: general and total IV anesthesia Induction: Intravenous Anesthetic plan and risks discussed with: Patient Propofol MAC

## 2018-11-06 NOTE — PROGRESS NOTES
Hospitalist Progress Note NAME: Ugo Rowe :  1923 MRN:  [id number] Assessment / Plan: 
Acute blood loss anemia secondary to GI bleed 
-cont' PO PPI. Has not required transfusion.  hgb is low but stable at ~8 
-EGD showed stomach portion with petechia/erythema c/w gastropathy s/p bx. not clear that the EGD findings are enough to explain melena 
-Ccope showed diverticulosis throughout the sigmoid, descending and transverse colon, grade 1 internal hemorroids, 5 mm polyp in the transverse colon s/p cold snare removal 
-re-bleeding risk on xarelto is significant. I discussed this with pt's daughter at bedside and suggestive other alternatives. She would like to hav ept follow up with his cardiologist and have this discussion (daughter in the process of moving appnt up this week. -appreciate GI's consultation Orthostatic hypotension in the setting of dehydration 
-from NPO status and diarrhea overnight 
-give 250cc fluid bolus. Encourage PO intake 
-monitor vitals closely 
-discussed with pt and his daughter in regards to rehab on discharge, both agreed. Will ask CM to facilitate this. Acute hypoxic respiratory failure due to acute pulmonary edema, resolved 
-clinically vol overload on  likely due to IVF administration - CXR showed pulmonary edema 
- received IV lasix x2  
-Echo 70%. -stable on RA 
 
CKD 3 
-cr slightly up due to NPO status. -hold nephrotoxic agents, repeat bmp Hyperlipidemia 
-continue Lipitor 80 mg daily Hypothyroidism 
-continue levothyroxine 75 mcg daily Atrial fibrillation/CAD 
-Hold Xarelto because of GI bleed. Daughter wants to discuss with pt's cardiologist in the next few days outpt. Will stop on discharge due to high risk of rebleeding. Daughter understands and agreed with current plan. Depression 
-Continue home medication Lexapro 5 mg daily 
  
Mild dementia 
-daughter reported mild confusion at baseline 
-avoid sedating meds -sitter prn 
  
Code Status: full code Surrogate Decision Maker: daughter   
DVT Prophylaxis: SCDs GI Prophylaxis: not indicated  
Baseline: from NH facility Subjective: Chief Complaint / Reason for Physician Visit Pt seen at bedside, post procedure. He was hypotensive during PT session. Has been NPO all day for procedure. Met with daughter at bedside to discuss results and dispo plan. Discussed with RN events overnight. Review of Systems: 
Symptom Y/N Comments  Symptom Y/N Comments Fever/Chills n   Chest Pain n   
Poor Appetite    Edema Cough    Abdominal Pain n   
Sputum    Joint Pain SOB/YOUNG n   Pruritis/Rash Nausea/vomit    Tolerating PT/OT Diarrhea    Tolerating Diet Constipation    Other Could NOT obtain due to:   
 
Objective: VITALS:  
Last 24hrs VS reviewed since prior progress note. Most recent are: 
Patient Vitals for the past 24 hrs: 
 Temp Pulse Resp BP SpO2  
11/06/18 1446    112/47   
11/06/18 1445    (!) 83/35   
11/06/18 1400    121/52   
11/06/18 1345 97.9 °F (36.6 °C) 62 18 134/54 100 % 11/06/18 1304  61 13 130/63 100 % 11/06/18 1301  63 21 106/53 100 % 11/06/18 1256  60 14 120/59 97 % 11/06/18 1254  60 13 132/68 97 % 11/06/18 1251  61 12 133/63 99 % 11/06/18 1249  62 9 137/61 100 % 11/06/18 1246  68 13 129/65 100 % 11/06/18 1245 97.9 °F (36.6 °C) 71 12 147/76 100 % 11/06/18 1240  60 22 140/62 99 % 11/06/18 1235  60 20 131/62 100 % 11/06/18 1230  60 18 140/61 100 % 11/06/18 1156 97.5 °F (36.4 °C) 60 21 129/59 99 % 11/06/18 0751 97.9 °F (36.6 °C) 65 22 124/56 92 % 11/06/18 0353 97.5 °F (36.4 °C) 62 16 145/54 100 % 11/05/18 2345 97.3 °F (36.3 °C) 70 18 142/57 93 % 11/05/18 1955 97.7 °F (36.5 °C) 61 18 139/62 99 % Intake/Output Summary (Last 24 hours) at 11/6/2018 1543 Last data filed at 11/6/2018 1228 Gross per 24 hour Intake 1420 ml Output 150 ml Net 1270 ml PHYSICAL EXAM: 
 General: Male up in bed, NAD  
EENT:  EOMI. Anicteric sclerae. MMM Resp:  Crackles, no wheezing. No accessory muscle use CV:  Regular  rhythm,  No edema GI:  Soft, Non distended, Non tender.  +BS Neurologic:  Alert and oriented X 2, normal speech Psych:   Not anxious nor agitated Skin:  No rashes. No jaundice Reviewed most current lab test results and cultures  YES Reviewed most current radiology test results   YES Review and summation of old records today    NO Reviewed patient's current orders and MAR    YES 
PMH/SH reviewed - no change compared to H&P 
________________________________________________________________________ Care Plan discussed with: 
  Comments Patient x Family  x daughter RN x Care Manager Consultant  x GI Multidiciplinary team rounds were held today with , nursing, pharmacist and clinical coordinator. Patient's plan of care was discussed; medications were reviewed and discharge planning was addressed. ________________________________________________________________________ Total NON critical care TIME:  35   Minutes Total CRITICAL CARE TIME Spent:   Minutes non procedure based Comments >50% of visit spent in counseling and coordination of care    
________________________________________________________________________ Gabbi Wisdom MD  
 
Procedures: see electronic medical records for all procedures/Xrays and details which were not copied into this note but were reviewed prior to creation of Plan. LABS: 
I reviewed today's most current labs and imaging studies. Pertinent labs include: 
Recent Labs 11/06/18 
0431 11/05/18 
0452 11/04/18 
1533 11/04/18 
0501 WBC 6.0 5.4  --  7.1 HGB 8.7* 8.0*  8.0* 8.1* 7.9*  
HCT 27.4* 25.5*  25.3* 24.9* 24.8*  
 143*  --  138* Recent Labs 11/06/18 
0431 11/05/18 
0452 11/04/18 
0501 * 142 141  
K 3.7 3.5 3.8 * 109* 108 CO2 22 25 25 * 82 96 BUN 22* 19 23* CREA 1.41* 1.22 1.43* CA 9.0 8.2* 7.9* Signed: Domingo Germain MD

## 2018-11-06 NOTE — ANESTHESIA POSTPROCEDURE EVALUATION
Procedure(s): 
COLONOSCOPY 
ENDOSCOPIC POLYPECTOMY. Anesthesia Post Evaluation Patient location during evaluation: PACU Note status: Adequate. Level of consciousness: responsive to verbal stimuli and sleepy but conscious Pain management: satisfactory to patient Airway patency: patent Anesthetic complications: no 
Cardiovascular status: acceptable Respiratory status: acceptable Hydration status: acceptable Comments: +Post-Anesthesia Evaluation and Assessment Patient: Chelle Rios MRN: [id number]  SSN: [id number] YOB: 1923  Age: 80 y.o. Sex: male Cardiovascular Function/Vital Signs /63   Pulse 61   Temp 36.6 °C (97.9 °F)   Resp 13   Ht 5' 11\" (1.803 m)   Wt 68.5 kg (151 lb 0.2 oz)   SpO2 100%   BMI 21.06 kg/m² Patient is status post Procedure(s): 
COLONOSCOPY 
ENDOSCOPIC POLYPECTOMY. Nausea/Vomiting: Controlled. Postoperative hydration reviewed and adequate. Pain: 
Pain Scale 1: Numeric (0 - 10) (11/06/18 1256) Pain Intensity 1: 0 (11/06/18 1256) Managed. Neurological Status: At baseline. Mental Status and Level of Consciousness: Arousable. Pulmonary Status:  
O2 Device: Room air (11/06/18 1304) Adequate oxygenation and airway patent. Complications related to anesthesia: None Post-anesthesia assessment completed. No concerns. Signed By: Smita Palacios DO  
 11/6/2018 Visit Vitals /63 Pulse 61 Temp 36.6 °C (97.9 °F) Resp 13 Ht 5' 11\" (1.803 m) Wt 68.5 kg (151 lb 0.2 oz) SpO2 100% BMI 21.06 kg/m²

## 2018-11-07 VITALS
DIASTOLIC BLOOD PRESSURE: 42 MMHG | SYSTOLIC BLOOD PRESSURE: 107 MMHG | OXYGEN SATURATION: 96 % | BODY MASS INDEX: 21.14 KG/M2 | HEART RATE: 62 BPM | TEMPERATURE: 98.2 F | HEIGHT: 71 IN | WEIGHT: 151.01 LBS | RESPIRATION RATE: 22 BRPM

## 2018-11-07 LAB
ANION GAP SERPL CALC-SCNC: 10 MMOL/L (ref 5–15)
BASOPHILS # BLD: 0 K/UL (ref 0–0.1)
BASOPHILS NFR BLD: 0 % (ref 0–1)
BUN SERPL-MCNC: 24 MG/DL (ref 6–20)
BUN/CREAT SERPL: 18 (ref 12–20)
CALCIUM SERPL-MCNC: 8.1 MG/DL (ref 8.5–10.1)
CHLORIDE SERPL-SCNC: 111 MMOL/L (ref 97–108)
CO2 SERPL-SCNC: 22 MMOL/L (ref 21–32)
CREAT SERPL-MCNC: 1.33 MG/DL (ref 0.7–1.3)
DIFFERENTIAL METHOD BLD: ABNORMAL
EOSINOPHIL # BLD: 0.7 K/UL (ref 0–0.4)
EOSINOPHIL NFR BLD: 10 % (ref 0–7)
ERYTHROCYTE [DISTWIDTH] IN BLOOD BY AUTOMATED COUNT: 15 % (ref 11.5–14.5)
GLUCOSE SERPL-MCNC: 88 MG/DL (ref 65–100)
HCT VFR BLD AUTO: 25.1 % (ref 36.6–50.3)
HGB BLD-MCNC: 8 G/DL (ref 12.1–17)
IMM GRANULOCYTES # BLD: 0 K/UL (ref 0–0.04)
IMM GRANULOCYTES NFR BLD AUTO: 0 % (ref 0–0.5)
LYMPHOCYTES # BLD: 1.5 K/UL (ref 0.8–3.5)
LYMPHOCYTES NFR BLD: 22 % (ref 12–49)
MCH RBC QN AUTO: 31.6 PG (ref 26–34)
MCHC RBC AUTO-ENTMCNC: 31.9 G/DL (ref 30–36.5)
MCV RBC AUTO: 99.2 FL (ref 80–99)
MONOCYTES # BLD: 0.5 K/UL (ref 0–1)
MONOCYTES NFR BLD: 7 % (ref 5–13)
NEUTS SEG # BLD: 4.1 K/UL (ref 1.8–8)
NEUTS SEG NFR BLD: 60 % (ref 32–75)
NRBC # BLD: 0 K/UL (ref 0–0.01)
NRBC BLD-RTO: 0 PER 100 WBC
PLATELET # BLD AUTO: 135 K/UL (ref 150–400)
PMV BLD AUTO: 10.5 FL (ref 8.9–12.9)
POTASSIUM SERPL-SCNC: 3.5 MMOL/L (ref 3.5–5.1)
RBC # BLD AUTO: 2.53 M/UL (ref 4.1–5.7)
SODIUM SERPL-SCNC: 143 MMOL/L (ref 136–145)
WBC # BLD AUTO: 6.8 K/UL (ref 4.1–11.1)

## 2018-11-07 PROCEDURE — 80048 BASIC METABOLIC PNL TOTAL CA: CPT | Performed by: INTERNAL MEDICINE

## 2018-11-07 PROCEDURE — 36415 COLL VENOUS BLD VENIPUNCTURE: CPT | Performed by: INTERNAL MEDICINE

## 2018-11-07 PROCEDURE — 85025 COMPLETE CBC W/AUTO DIFF WBC: CPT | Performed by: INTERNAL MEDICINE

## 2018-11-07 PROCEDURE — 74011250637 HC RX REV CODE- 250/637: Performed by: INTERNAL MEDICINE

## 2018-11-07 PROCEDURE — 74011250637 HC RX REV CODE- 250/637: Performed by: SPECIALIST

## 2018-11-07 RX ORDER — PANTOPRAZOLE SODIUM 40 MG/1
40 TABLET, DELAYED RELEASE ORAL
Qty: 40 TAB | Refills: 0 | Status: SHIPPED
Start: 2018-11-08 | End: 2019-09-16 | Stop reason: SDUPTHER

## 2018-11-07 RX ADMIN — LORATADINE 10 MG: 10 TABLET ORAL at 09:12

## 2018-11-07 RX ADMIN — FERROUS SULFATE TAB 325 MG (65 MG ELEMENTAL FE) 325 MG: 325 (65 FE) TAB at 09:12

## 2018-11-07 RX ADMIN — PANTOPRAZOLE SODIUM 40 MG: 40 TABLET, DELAYED RELEASE ORAL at 09:11

## 2018-11-07 RX ADMIN — FINASTERIDE 5 MG: 5 TABLET, FILM COATED ORAL at 09:12

## 2018-11-07 RX ADMIN — THERA TABS 1 TABLET: TAB at 09:12

## 2018-11-07 RX ADMIN — LEVOTHYROXINE SODIUM 75 MCG: 75 TABLET ORAL at 09:12

## 2018-11-07 RX ADMIN — ATORVASTATIN CALCIUM 80 MG: 40 TABLET, FILM COATED ORAL at 09:12

## 2018-11-07 RX ADMIN — ESCITALOPRAM OXALATE 5 MG: 10 TABLET ORAL at 09:12

## 2018-11-07 RX ADMIN — Medication 10 ML: at 07:43

## 2018-11-07 NOTE — PROGRESS NOTES
TRANSFER - OUT REPORT: 
 
Verbal report given to familia (name) on Susan Meredith  being transferred to [facility] for routine progression of care Report consisted of patients Situation, Background, Assessment and  
Recommendations(SBAR). Information from the following report(s) SBAR, Kardex, Recent Results and Med Rec Status was reviewed with the receiving nurse. Lines:  
Peripheral IV 11/02/18 Right Forearm (Active) Site Assessment Clean, dry, & intact 11/7/2018  7:53 AM  
Phlebitis Assessment 0 11/7/2018  7:53 AM  
Infiltration Assessment 0 11/7/2018  7:53 AM  
Dressing Status Clean, dry, & intact 11/7/2018  7:53 AM  
Dressing Type Transparent 11/7/2018  7:53 AM  
Hub Color/Line Status Pink 11/7/2018  7:53 AM  
   
Peripheral IV 11/02/18 Left Wrist (Active) Site Assessment Clean, dry, & intact 11/7/2018  7:53 AM  
Phlebitis Assessment 0 11/7/2018  7:53 AM  
Infiltration Assessment 0 11/7/2018  7:53 AM  
Dressing Status Clean, dry, & intact 11/7/2018  7:53 AM  
Dressing Type Transparent 11/7/2018  7:53 AM  
Hub Color/Line Status Pink 11/7/2018  7:53 AM  
  
 
Opportunity for questions and clarification was provided.    
 
Patient transported with:

## 2018-11-07 NOTE — PROGRESS NOTES
Verification of Inpatient Status order x 3 Midnights occurred on  11/2/2018 with patient eligible for placement at nursing facility on 11/5/2018. [facility] acm [id number]

## 2018-11-07 NOTE — DISCHARGE SUMMARY
Discharge Summary      Name: Olden Fleischer  [id number]  YOB: 1923 (Age: 80 y.o.)   Date of Admission: 11/1/2018  Date of Discharge: 11/7/2018  Attending Physician: Rhianna Lewis MD    Discharge Diagnosis:   Acute blood loss anemia secondary to GI bleed   Orthostatic hypotension   Acute hypoxic respiratory failure due to acute pulmonary edema, resolved   CKD 3  Hyperlipidemia  Hypothyroidism  Atrial fibrillation/CAD  Depression  Mild dementia    Consultations:  IP CONSULT TO GASTROENTEROLOGY  IP CONSULT TO GASTROENTEROLOGY    Procedures:     Brief Admission History/Reason for Admission Per Audrey Macias MD:   80years old male with past medical history significant for hypertension, atrial fibrillation, depression, high cholesterol, anemia, chronic kidney disease, anxiety, hypothyroidism was transferred from [facility] ED because of GI bleed, according to the patient daughter she noticed intermittent weakness during the day associated with the 2 near-syncope episode, patient also noticed some stool color change past few days patient denies any abdominal pain any chest pain any shortness of breath, hemoglobin was checked out [facility] and was found to be 7.8 dropped from 10.6, Hemoccult of stool was checked which came back positive. Brief Hospital Course by Main Problems:   Acute blood loss anemia secondary to GI bleed  Did not required transfusion during inpt stay. Hgb is low but stable at ~8. EGD showed stomach portion with petechia/erythema c/w gastropathy s/p bx. not clear that the EGD findings are enough to explain melena. Ccope showed diverticulosis throughout the sigmoid, descending and transverse colon, grade 1 internal hemorroids, 5 mm polyp in the transverse colon s/p cold snare removal.   Discussed with GI, felt re-bleeding risk on xarelto is significant. I discussed this with pt's daughter at bedside and suggestive other alternatives.  She would like to have pt follow up with his cardiologist and have this discussion (daughter in the process of moving appnt up this week. Xarelto has been discontinued for now due to acute GI bleed. Pt to f/u with his cardiologist in 1-2 weeks. Daughter made appnt for next week     Orthostatic hypotension in the setting of dehydration from NPO status and bowel prep, resolved with 250cc fluid bolus. Pt is tolerating PO intake     Acute hypoxic respiratory failure due to acute pulmonary edema, resolved  Clinically vol overload on 11/2 likely due to IVF administration. CXR showed pulmonary edema. Symptoms resolved with discontinuation of IVF and IV lasix 40mg X2 doses. Echo showed EF 70%. He has remained stable on RA >48hrs.       CKD 3, stable  Hyperlipidemia  Continue Lipitor 80 mg daily    Hypothyroidism  Continue levothyroxine 75 mcg daily    Atrial fibrillation/CAD  Xarelto on hold due to GI bleed. Daughter wants to discuss with pt's cardiologist in the next few days outpt. Will stop on discharge due to high risk of re-bleeding. Daughter understands and agreed with current plan. Depression  Continue home medication Lexapro 5 mg daily     Mild dementia  Daughter reported mild confusion at baseline. He is at his baseline. Discharge Exam:  Patient seen and examined by me on discharge day. Pertinent Findings:  Visit Vitals  /46 (BP 1 Location: Left arm, BP Patient Position: At rest)   Pulse 60   Temp 98.1 °F (36.7 °C)   Resp 20   Ht 5' 11\" (1.803 m)   Wt 68.5 kg (151 lb 0.2 oz)   SpO2 95%   BMI 21.06 kg/m²     Gen:    Not in distress  Chest: Clear lungs  CVS:   Regular rhythm.   No edema  Abd:  Soft, not distended, not tender    Discharge/Recent Laboratory Results:  Recent Labs     11/07/18  0400      K 3.5   *   CO2 22   BUN 24*   GLU 88   CA 8.1*     Recent Labs     11/07/18  0400   HGB 8.0*   HCT 25.1*   WBC 6.8   *       Discharge Medications:  Current Discharge Medication List      START taking these medications    Details   pantoprazole (PROTONIX) 40 mg tablet Take 1 Tab by mouth Daily (before breakfast). Qty: 40 Tab, Refills: 0         CONTINUE these medications which have NOT CHANGED    Details   acetaminophen (TYLENOL) 650 mg suppository Insert 650 mg into rectum every four (4) hours as needed for Fever. bisacodyl (DULCOLAX, BISACODYL,) 10 mg suppository Insert 10 mg into rectum as needed. neomycin-bacitracin-polymyxin (TRIPLE ANTIBIOTIC) 3.5mg-400 unit- 5,000 unit/gram ointment Apply 1 Each to affected area as needed (to wound tear/abrasion). Ferrous Fumarate 325 mg (106 mg iron) tab Take  by mouth two (2) times a day. nitroglycerin (NITROSTAT) 0.4 mg SL tablet by SubLINGual route every five (5) minutes as needed for Chest Pain (not to exceed 3 doses). escitalopram oxalate (LEXAPRO) 5 mg tablet Take 1 Tab by mouth daily. Qty: 30 Tab, Refills: 2    Associated Diagnoses: Seasonal affective disorder ([address] Mild single current episode of major depressive disorder (HCC)      loratadine (CLARITIN) 10 mg tablet Take 1 Tab by mouth daily. Indications: ALLERGIC RHINITIS  Qty: 30 Tab, Refills: 5    Associated Diagnoses: Seasonal allergic rhinitis due to pollen      atorvastatin (LIPITOR) 80 mg tablet Take 1 Tab by mouth daily. Qty: 30 Tab, Refills: 11      finasteride (PROSCAR) 5 mg tablet Take 1 Tab by mouth daily. Indications: SYMPTOMATIC BENIGN PROSTATIC HYPERPLASIA  Qty: 30 Tab, Refills: 5    Associated Diagnoses: Benign non-nodular prostatic hyperplasia, presence of lower urinary tract symptoms unspecified      cyanocobalamin (VITAMIN B12) 1,000 mcg/mL injection 1,000 mcg by IntraMUSCular route. Every 3 weeks      mineral oil (FLEET) enema Insert  into rectum as needed for Constipation (daily as needed). multivitamin (ONE A DAY) tablet Take 1 Tab by mouth daily. acetaminophen (TYLENOL) 325 mg tablet Take 650 mg by mouth every four (4) hours as needed for Pain.  Take two tabs po bid.      levothyroxine (SYNTHROID) 75 mcg tablet Take  by mouth Daily (before breakfast). !! OTHER as needed. Natural Laxative 30cc po every day as needed      magnesium hydroxide (CLEMENS MILK OF MAGNESIA) 400 mg/5 mL suspension Take 30 mL by mouth daily as needed for Constipation. albuterol (PROVENTIL VENTOLIN) 2.5 mg /3 mL (0.083 %) nebulizer solution 2.5 mg by Nebulization route as needed for Wheezing. Every 6 hrs prn SOB. Protein Supplement liqd Take 1 CUP by mouth two (2) times a day. Qty: 60 Bottle, Refills: 5    Associated Diagnoses: Hypoproteinemia ([address]      !! OTHER Spiritus Fermenti (Wine) May keep wine in room. alum-mag hydroxide-simeth (MAALOX ADVANCED) 200-200-20 mg/5 mL susp Take 30 mL by mouth every four (4) hours as needed. !! - Potential duplicate medications found. Please discuss with provider.       STOP taking these medications       rivaroxaban (XARELTO) 15 mg tab tablet Comments:   Reason for Stopping:         famotidine (PEPCID) 20 mg tablet Comments:   Reason for Stopping:               DISPOSITION:    Home with Family:    Home with HH/PT/OT/RN:    SNF/LTC: x   BLANE:    OTHER:          Follow up with:   PCP : Rigo High NP  Follow-up Information     Follow up With Specialties Details Why Contact Info    Rigo High NP Nurse Practitioner   [address]  [address] [place] [address]      Ginger [place], [address] [place] [address]  [phone]          Code: Full  Diet: regular    Total time in minutes spent coordinating this discharge (includes going over instructions, follow-up, prescriptions, and preparing report for sign off to her PCP) :  35 minutes

## 2018-11-07 NOTE — PROGRESS NOTES
CM called LOGAN Shipley at [facility] and left a message about the discharge plan of pt needing to go to their SNF. CM faxed pt's progress notes and therapy notes to [facility]. 2pm - CM spoke with LOGAN Shipley and informed her of the discharge and transport time of 3:30pm. Pt's nurse will need to call [phone] with report and fax discharge summary, AVS, MARS to [phone]. 2nd  Medicare letter given to pt. 3pm - CM had pt's 3 midnight qualifying stay for a skilled nursing facility under Medicare benefits reviewed by Geovanna Mejía RN. CM faxed discharge summary to [facility]. Care Management Interventions PCP Verified by CM: Yes Mode of Transport at Discharge: BLS([facility] medical transport) Transition of Care Consult (CM Consult): SNF([facility]) Partner SNF: No 
Reason Why Partner SNF Not Chosen: Location MyChart Signup: No 
Discharge Durable Medical Equipment: No(owns a cane and rollator) Physical Therapy Consult: Yes Occupational Therapy Consult: Yes Speech Therapy Consult: Yes Current Support Network: Assisted Living(Lives in his own apartment) Confirm Follow Up Transport: Other (see comment)(medical transport - [facility]) Plan discussed with Pt/Family/Caregiver: Yes Freedom of Choice Offered: Yes Discharge Location Discharge Placement: Skilled nursing facility [facility], [facility] [place]

## 2018-11-07 NOTE — PROGRESS NOTES
Bedside and Verbal shift change report given to ada Seymour (oncoming nurse) by Samuel Duncan (offgoing nurse). Report included the following information SBAR, Kardex, Intake/Output, MAR and Recent Results.

## 2018-11-08 ENCOUNTER — TELEPHONE (OUTPATIENT)
Dept: ONCOLOGY | Age: 83
End: 2018-11-08

## 2018-11-13 NOTE — PROGRESS NOTES
Talia Blair is a 80 y.o. male, Nata Jones, NP called stating patient was in hospital for a near near syncopal episode. During hospitalization he had a colonoscopy. He is still having problems with Orthostatic hypotension. Last HgB which was done 11/12/18 was 7.9. He is still taking his Ferrous Fumerate and B12 injections. He has stopped Xarelto at the request of the ER doctor in \[facility]\"" Emergency Department. Blood drawn today.

## 2018-11-14 ENCOUNTER — OFFICE VISIT (OUTPATIENT)
Dept: ONCOLOGY | Age: 83
End: 2018-11-14

## 2018-11-14 VITALS
HEART RATE: 63 BPM | OXYGEN SATURATION: 99 % | SYSTOLIC BLOOD PRESSURE: 158 MMHG | WEIGHT: 151.2 LBS | DIASTOLIC BLOOD PRESSURE: 70 MMHG | TEMPERATURE: 97.7 F | HEIGHT: 71 IN | BODY MASS INDEX: 21.17 KG/M2 | RESPIRATION RATE: 24 BRPM

## 2018-11-14 DIAGNOSIS — D64.9 SYMPTOMATIC ANEMIA: Primary | ICD-10-CM

## 2018-11-14 DIAGNOSIS — D69.6 THROMBOCYTOPENIA (HCC): ICD-10-CM

## 2018-11-14 DIAGNOSIS — D50.9 IRON DEFICIENCY ANEMIA, UNSPECIFIED IRON DEFICIENCY ANEMIA TYPE: ICD-10-CM

## 2018-11-14 NOTE — PROGRESS NOTES
Hematology Oncology Progress Note    Kojo Franco is a 80 y.o. male with multifactorial anemia likely anemia of CKD and iron deficiency is now here for a 3 month follow up visit. He received PRBC transfusions in the past but has never been on Procrit. Huis creatinine ranges 1.2 to 1.3 and his hemoglobin has lately been ranging 10-11. He was admitted to the hospital on 11/1/18 with GI bleed, syncope and hypoxia. His hemoglobin was 8.0 but didn't receive blood transfusion, prior to that he was seen in the office on 10/15 when his Hb was 10.6. He underwent EGD & colonoscopy that showed gastropathy, diverticulosis throughout sigmoid, descending & transverse colon and grade I hemorrhoids. Xarelto was discontinued and he was advised to follow up with his cardiologist. He was seen by his cardiologist and has plans for NATANAEL. He continues to be off of Xarelto. Chart notes reviewed since last visit. Chief complaints: He complains of fatigue, mild dyspnea on exertion, diarrhea on & off. He denies any loss of weight or apetite, bloody or black stools or any other major complaints at this time. Visit Vitals  /70   Pulse 63   Temp 97.7 °F (36.5 °C) (Oral)   Resp 24   Ht 5' 11\" (1.803 m)   Wt 151 lb 3.2 oz (68.6 kg)   SpO2 99%   BMI 21.09 kg/m²     Review of Systems:  Constitutional No fevers, chills, night sweats, excessive fatigue or weight loss. Allergic/Immunologic No recent allergic reactions   Eyes No significant visual difficulties. No diplopia. ENMT No problems with hearing, no sore throat, no sinus drainage. Endocrine No hot flashes or night sweats. No cold intolerance, polyuria, or polydipsia   Hematologic/Lymphatic No easy bruising or bleeding. The patient denies any tender or palpable lymph nodes   Breasts No abnormal masses of breast, nipple discharge or pain. Respiratory No dyspnea on exertion, orthopnea, chest pain, cough or hemoptysis.    Cardiovascular No anginal chest pain, irregular heart beat, tachycardia, palpitations or orthopnea. Gastrointestinal No nausea, vomiting, diarrhea, constipation, cramping, dysphagia, reflux, heartburn, GI bleeding, or early satiety. No change in bowel habits. Genitourinary (M) No hematuria, dysuria, increased frequency, urgency, hesitancy or incontinence. Musculoskeletal No joint pain, swelling or redness. No decreased range of motion. Integumentary No chronic rashes, inflammation, ulcerations, pruritus, petechiae, purpura, ecchymoses, or skin changes. Neurologic No headache, blurred vision, and no areas of focal weakness or numbness. Normal gait. No sensory problems. Psychiatric No insomnia, depression, yesenia or mood swings. No psychotropic drugs. Physical Examination: ECOG PS 1. Constitutional Alert, cooperative, oriented. Mood and affect appropriate. Appears close to chronological age. Well nourished. Well developed. Head Normocephalic; no scars   Eyes Pale conjunctivae, anicteric sclerae. Pupils are reactive and equal.   ENMT Sinuses are nontender. No oral exudates, ulcers, masses, thrush or mucositis. Oropharynx clear. Tongue normal.   Neck Supple without masses or thyromegaly. No jugular venous distension. Hematologic/Lymphatic No petechiae or purpura. No tender or palpable lymph nodes in the cervical, supraclavicular, axillary or inguinal area. Respiratory Lungs are clear to auscultation without rhonchi or wheezing. Cardiovascular Regular rate and rhythm of heart without murmurs, gallops or rubs. Chest / Line Site Chest is symmetric with no chest wall deformities. Abdomen Non-tender, non-distended, no masses, ascites or hepatosplenomegaly. Good bowel sounds. No guarding or rebound tenderness. No pulsatile masses. Musculoskeletal No tenderness or swelling, normal range of motion without obvious weakness. Extremities No visible deformities, no cyanosis, clubbing or edema.     Skin No rashes, scars, or lesions suggestive of malignancy. No petechiae, purpura, or ecchymoses. No excoriations. Neurologic No sensory or motor deficits, normal cerebellar function, normal gait, cranial nerves intact. Psychiatric Alert and oriented times three. Coherent speech. Verbalizes understanding of our discussions today. Labs:  WBC 5.2   4.1 - 11.1 K/uL Final   Comment:   Due to mathematical rounding between the [address], and the new [employer] Hematology analyzers, the reported automated differential may vary by up to +/- 0.5% per cell line. This finding may produce a result that is 100% +/- 3%, which is clinically insignificant. RBC 2.97 Abnormally low   L  4.10 - 5.70 M/uL Final   HGB 9.2 Abnormally low   L  12.1 - 17.0 g/dL Final   HCT 29.7 Abnormally low   L  36.6 - 50.3 % Final   .0 Abnormally high   H  80.0 - 99.0 FL Final   MCH 31.0   26.0 - 34.0 PG Final   MCHC 31.0   30.0 - 36.5 g/dL Final   RDW 14.6 Abnormally high   H  11.5 - 14.5 % Final   PLATELET 975   356 - 400 K/uL Final   MPV 10.4   8.9 - 12.9 FL Final   NRBC 0.0   0  WBC Final   ABSOLUTE NRBC 0.00   0.00 - 0.01 K/uL Final   NEUTROPHILS 57   32 - 75 % Final   LYMPHOCYTES 25   12 - 49 % Final   MONOCYTES 9   5 - 13 % Final   EOSINOPHILS 10 Abnormally high   H  0 - 7 % Final   BASOPHILS 0   0 - 1 % Final   IMMATURE GRANULOCYTES 0   0.0 - 0.5 % Final   ABS. NEUTROPHILS 3.0   1.8 - 8.0 K/UL Final   ABS. LYMPHOCYTES 1.3   0.8 - 3.5 K/UL Final   ABS. MONOCYTES 0.5   0.0 - 1.0 K/UL Final   ABS.  EOSINOPHILS 0.5 Abnormally high   H  0.0 - 0.4 K/UL Final     Component Value Flag Ref Range Units Status   Sodium 144   136 - 145 mmol/L Final   Potassium 4.3   3.5 - 5.1 mmol/L Final   Chloride 106   97 - 108 mmol/L Final   CO2 26   21 - 32 mmol/L Final   Anion gap 12   5 - 15 mmol/L Final   Glucose 80   65 - 100 mg/dL Final   BUN 23 Abnormally high   H  6 - 20 MG/DL Final   Creatinine 1.32 Abnormally high   H  0.70 - 1.30 MG/DL Final BUN/Creatinine ratio 17   12 - 20   Final   GFR est AA >60   >60 ml/min/1.73m2 Final   GFR est non-AA 50 Abnormally low   L  >60 ml/min/1.73m2 Final   Calcium 8.9   8.5 - 10.1 MG/DL Final   Bilirubin, total 0.4   0.2 - 1.0 MG/DL Final   ALT (SGPT) 22   12 - 78 U/L Final   AST (SGOT) 28   15 - 37 U/L Final   Alk. phosphatase 112   45 - 117 U/L Final   Protein, total 7.1   6.4 - 8.2 g/dL Final   Albumin 3.5   3.5 - 5.0 g/dL Final   Globulin 3.6   2.0 - 4.0 g/dL Final   A-G Ratio 1.0 Abnormally low   L  1.1 - 2.2   Final     Component Value Flag Ref Range Units Status   Iron 68   65 - 175 ug/dL Final   TIBC 210 Abnormally low   L  250 - 450 ug/dL Final   Iron % saturation 32            Assessment and Plan:     1. Severe symptomatic anemia:    - His repeat H&H today is 9.2 & 29.7.   - Iron studies normal, ferritin pending.  - We will continue to closely monitor for now and repeat CBC in 2 weeks. - He can proceed with his NATANAEL procedure. 2. Multifactorial anemia likely anemia of CKD with iron deficiency cannot rule out possible underlying MDS:    - His H&H today is 9.2 & 29.7 and his creatinine is elevated at 1.32.  - Continue Ferrous fumerate 324mg twice daily and B12 1000mcg once every 3 weeks. - Closely monitor CBC, BMP every 3-6 months. - Next follow up in 5 months with repeat CBC, BMP, iron studies, feritin, B12 & folate levels. He and his daughter understand to come back sooner if need arises. 3. 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 His platelet count today is 157K.  - No aggressive interventions recommended. We will continue to monitor.

## 2019-01-04 ENCOUNTER — TELEPHONE (OUTPATIENT)
Dept: ONCOLOGY | Age: 84
End: 2019-01-04

## 2019-01-04 NOTE — TELEPHONE ENCOUNTER
CHIKIS verified. Spoke with Sonny Corona at [facility], relayed Per Dr Makayla Ruiz patient CBC is stable need to change to every 3 weeks instead of every 1 week.   She verbalized understanding

## 2019-04-18 ENCOUNTER — OFFICE VISIT (OUTPATIENT)
Dept: ONCOLOGY | Age: 84
End: 2019-04-18

## 2019-04-18 VITALS
RESPIRATION RATE: 16 BRPM | SYSTOLIC BLOOD PRESSURE: 150 MMHG | DIASTOLIC BLOOD PRESSURE: 71 MMHG | BODY MASS INDEX: 21.84 KG/M2 | TEMPERATURE: 97.9 F | HEIGHT: 71 IN | HEART RATE: 64 BPM | WEIGHT: 156 LBS | OXYGEN SATURATION: 97 %

## 2019-04-18 DIAGNOSIS — D64.9 SYMPTOMATIC ANEMIA: Primary | ICD-10-CM

## 2019-04-18 RX ORDER — LOPERAMIDE HYDROCHLORIDE 2 MG/1
CAPSULE ORAL
COMMUNITY

## 2019-04-18 NOTE — PROGRESS NOTES
Reason for Visit:  
Khurram Borges is a 80 y.o. male who is seen for follow up anemia Treatment History: Dx: Multifactorial Anemia: likely anemia of CKD and iron deficiency is now here for a 6 month follow up visit. He received PRBC transfusions in the past but has never been on Procrit. His creatinine ranges 1.2 to 1.3 and his hemoglobin has lately been ranging 10-11. Was admitted to the hospital on 11/1/18 with GI bleed, syncope and hypoxia. His hemoglobin was 8.0 but didn't receive blood transfusion, prior to that he was seen in the office on 10/15 when his Hb was 10.6. He underwent EGD & colonoscopy that showed gastropathy, diverticulosis throughout sigmoid, descending & transverse colon and grade I hemorrhoids. Xarelto was discontinued and he was advised to follow up with his cardiologist.  Is now on Eloquis at half dose History of Present Illness:  
Doing ok, no recent bleeding and counts have been stable. Continues with balance and strengthening therapy as well as occasional golf Past Medical History:  
Diagnosis Date  Anemia  Anxiety  Arrhythmia Afib  Chronic kidney disease  Constipation  Depression  Hypercholesterolemia  Hypertension  NSTEMI (non-ST elevated myocardial infarction) ([facility] [address] 11/21/2016 [facility]:  JACKELIN to SVG-L-PLB and JACKELIN to SVG-LADstents 11/16 [facility] 2009 Followed By Dr Anna Marie Ny  S/P CABG x 4 1995  Thyroid disease   
 hypothyroidism Past Surgical History:  
Procedure Laterality Date  CARDIAC SURG PROCEDURE UNLIST    
 pacer  CARDIAC SURG PROCEDURE UNLIST  07/20/1995  
 quadruple bypass  COLONOSCOPY N/A 11/6/2018 COLONOSCOPY performed by Lissa Bal MD at [facility] ENDOSCOPY  
 [address] [place]  HX PACEMAKER  04/23/2003 Social History Tobacco Use  Smoking status: Former Smoker  Smokeless tobacco: Never Used Substance Use Topics  Alcohol use:  Yes  
 Alcohol/week: 4.2 oz Types: 7 Glasses of wine per week Family History Problem Relation Age of Onset  Glaucoma Mother  Heart Disease Father  Heart Disease Sister Current Outpatient Medications Medication Sig  
 lactobacillus rhamnosus gg 15 billion cell (CULTURELLE) 15 billion cell capsule Take 1 Cap by mouth daily.  apixaban (ELIQUIS) 2.5 mg tablet Take 1 Tab by mouth two (2) times a day.  pantoprazole (PROTONIX) 40 mg tablet Take 1 Tab by mouth Daily (before breakfast).  acetaminophen (TYLENOL) 650 mg suppository Insert 650 mg into rectum every four (4) hours as needed for Fever.  bisacodyl (DULCOLAX, BISACODYL,) 10 mg suppository Insert 10 mg into rectum as needed.  neomycin-bacitracin-polymyxin (TRIPLE ANTIBIOTIC) 3.5mg-400 unit- 5,000 unit/gram ointment Apply 1 Each to affected area as needed (to wound tear/abrasion).  Ferrous Fumarate 325 mg (106 mg iron) tab Take  by mouth two (2) times a day.  nitroglycerin (NITROSTAT) 0.4 mg SL tablet by SubLINGual route every five (5) minutes as needed for Chest Pain (not to exceed 3 doses).  escitalopram oxalate (LEXAPRO) 5 mg tablet Take 1 Tab by mouth daily.  loratadine (CLARITIN) 10 mg tablet Take 1 Tab by mouth daily. Indications: ALLERGIC RHINITIS  atorvastatin (LIPITOR) 80 mg tablet Take 1 Tab by mouth daily.  albuterol (PROVENTIL VENTOLIN) 2.5 mg /3 mL (0.083 %) nebulizer solution 2.5 mg by Nebulization route as needed for Wheezing. Every 6 hrs prn SOB.  finasteride (PROSCAR) 5 mg tablet Take 1 Tab by mouth daily. Indications: SYMPTOMATIC BENIGN PROSTATIC HYPERPLASIA  Protein Supplement liqd Take 1 CUP by mouth two (2) times a day.  cyanocobalamin (VITAMIN B12) 1,000 mcg/mL injection 1,000 mcg by IntraMUSCular route. Every 3 weeks  mineral oil (FLEET) enema Insert  into rectum as needed for Constipation (daily as needed).  OTHER Spiritus Fermenti ([employer]) May keep wine in room.  multivitamin (ONE A DAY) tablet Take 1 Tab by mouth daily.  acetaminophen (TYLENOL) 325 mg tablet Take 650 mg by mouth every four (4) hours as needed for Pain. Take two tabs po bid.  levothyroxine (SYNTHROID) 75 mcg tablet Take  by mouth Daily (before breakfast).  OTHER as needed. Natural Laxative 30cc po every day as needed  magnesium hydroxide (CLEMENS MILK OF MAGNESIA) 400 mg/5 mL suspension Take 30 mL by mouth daily as needed for Constipation.  alum-mag hydroxide-simeth (MAALOX ADVANCED) 200-200-20 mg/5 mL susp Take 30 mL by mouth every four (4) hours as needed. No current facility-administered medications for this visit. Allergies Allergen Reactions  Hydrocodone Nausea Only Review of Systems: A complete review of systems was obtained, negative except as described above. Physical Exam:  
There were no vitals taken for this visit. ECOG PS: 1-2 General: No distress Eyes: PERRLA, anicteric sclerae HENT: Atraumatic, OP clear Neck: Supple Lymphatic: No cervical, supraclavicular, or inguinal adenopathy Respiratory: CTAB, normal respiratory effort CV: Normal rate, regular rhythm, no murmurs, no peripheral edema GI: Soft, nontender, nondistended, no masses, no hepatomegaly, no splenomegaly MS: Normal gait and station. Digits without clubbing or cyanosis. Skin: No rashes, ecchymoses, or petechiae. Normal temperature, turgor, and texture. Psych: Alert, oriented, appropriate affect, normal judgment/insight Results:  
 
Lab Results Component Value Date/Time WBC 6.1 04/01/2019 08:15 AM  
 HGB 11.8 (L) 04/01/2019 08:15 AM  
 HCT 37.4 04/01/2019 08:15 AM  
 PLATELET 349 (L) 21/39/0397 08:15 AM  
 MCV 94.4 04/01/2019 08:15 AM  
 ABS. NEUTROPHILS 2.8 04/01/2019 08:15 AM  
 
Lab Results Component Value Date/Time  Sodium 146 (H) 01/14/2019 10:40 AM  
 Potassium 3.7 01/14/2019 10:40 AM  
 Chloride 108 01/14/2019 10:40 AM  
 CO2 26 01/14/2019 10:40 AM  
 Glucose 69 01/14/2019 10:40 AM  
 BUN 23 (H) 01/14/2019 10:40 AM  
 Creatinine 1.38 (H) 01/14/2019 10:40 AM  
 GFR est AA 58 (L) 01/14/2019 10:40 AM  
 GFR est non-AA 48 (L) 01/14/2019 10:40 AM  
 Calcium 8.3 (L) 01/14/2019 10:40 AM  
 
Lab Results Component Value Date/Time Bilirubin, total 0.3 01/14/2019 10:40 AM  
 ALT (SGPT) 20 01/14/2019 10:40 AM  
 AST (SGOT) 26 01/14/2019 10:40 AM  
 Alk. phosphatase 119 (H) 01/14/2019 10:40 AM  
 Protein, total 6.4 01/14/2019 10:40 AM  
 Albumin 3.4 (L) 01/14/2019 10:40 AM  
 Globulin 3.0 01/14/2019 10:40 AM  
 
 
 
Assessment:  
1) Anemia Plan:  
1) Right now treating as multifactoral--which seems appropirate--his hgb has recovered and is almost normal.  I don't want to check for MDS or MGUS given his overall frail state. He is very functional but the therapy for both would be too much. Its reasonalbe to stop checking counts every three weeks, we will see him back in 6 months unless counts drop.    
 
Signed By: Jessee Sheets MD

## 2019-05-09 PROBLEM — Z79.01 CURRENT USE OF LONG TERM ANTICOAGULATION: Chronic | Status: ACTIVE | Noted: 2019-05-09

## 2019-05-09 PROBLEM — S72.009A FEMORAL NECK FRACTURE (HCC): Status: ACTIVE | Noted: 2019-05-09

## 2019-05-09 PROBLEM — S72.002A CLOSED FRACTURE OF NECK OF LEFT FEMUR (HCC): Status: ACTIVE | Noted: 2019-05-09

## 2019-05-11 ENCOUNTER — HOSPITAL ENCOUNTER (INPATIENT)
Age: 84
LOS: 6 days | Discharge: NURSING, REHAB OR LONG-TERM CARE | DRG: 470 | End: 2019-05-17
Attending: INTERNAL MEDICINE | Admitting: INTERNAL MEDICINE
Payer: MEDICARE

## 2019-05-11 DIAGNOSIS — Z87.81 S/P LEFT HIP FRACTURE: Primary | ICD-10-CM

## 2019-05-11 PROBLEM — S72.92XA CLOSED LEFT FEMORAL FRACTURE (HCC): Status: ACTIVE | Noted: 2019-05-11

## 2019-05-11 LAB
ABO + RH BLD: NORMAL
BLOOD GROUP ANTIBODIES SERPL: NORMAL
SPECIMEN EXP DATE BLD: NORMAL

## 2019-05-11 PROCEDURE — 77030005518 HC CATH URETH FOL 2W BARD -B

## 2019-05-11 PROCEDURE — 74011250636 HC RX REV CODE- 250/636: Performed by: INTERNAL MEDICINE

## 2019-05-11 PROCEDURE — 74011250637 HC RX REV CODE- 250/637: Performed by: INTERNAL MEDICINE

## 2019-05-11 PROCEDURE — 65660000000 HC RM CCU STEPDOWN

## 2019-05-11 PROCEDURE — 77010033678 HC OXYGEN DAILY

## 2019-05-11 PROCEDURE — 36415 COLL VENOUS BLD VENIPUNCTURE: CPT

## 2019-05-11 PROCEDURE — 94760 N-INVAS EAR/PLS OXIMETRY 1: CPT

## 2019-05-11 PROCEDURE — 86900 BLOOD TYPING SEROLOGIC ABO: CPT

## 2019-05-11 RX ORDER — HYDROCODONE BITARTRATE AND ACETAMINOPHEN 5; 325 MG/1; MG/1
1 TABLET ORAL
Status: DISCONTINUED | OUTPATIENT
Start: 2019-05-11 | End: 2019-05-11

## 2019-05-11 RX ORDER — ACETAMINOPHEN 325 MG/1
650 TABLET ORAL
Status: DISCONTINUED | OUTPATIENT
Start: 2019-05-11 | End: 2019-05-12 | Stop reason: SDUPTHER

## 2019-05-11 RX ORDER — SODIUM CHLORIDE 0.9 % (FLUSH) 0.9 %
5-40 SYRINGE (ML) INJECTION AS NEEDED
Status: DISCONTINUED | OUTPATIENT
Start: 2019-05-11 | End: 2019-05-11 | Stop reason: SDUPTHER

## 2019-05-11 RX ORDER — SODIUM CHLORIDE 0.9 % (FLUSH) 0.9 %
5-40 SYRINGE (ML) INJECTION EVERY 8 HOURS
Status: DISCONTINUED | OUTPATIENT
Start: 2019-05-11 | End: 2019-05-11 | Stop reason: SDUPTHER

## 2019-05-11 RX ORDER — LEVOTHYROXINE SODIUM 75 UG/1
75 TABLET ORAL
Status: DISCONTINUED | OUTPATIENT
Start: 2019-05-12 | End: 2019-05-17 | Stop reason: HOSPADM

## 2019-05-11 RX ORDER — ACETAMINOPHEN 325 MG/1
650 TABLET ORAL
Status: DISCONTINUED | OUTPATIENT
Start: 2019-05-11 | End: 2019-05-11 | Stop reason: SDUPTHER

## 2019-05-11 RX ORDER — PANTOPRAZOLE SODIUM 40 MG/1
40 TABLET, DELAYED RELEASE ORAL
Status: DISCONTINUED | OUTPATIENT
Start: 2019-05-12 | End: 2019-05-17 | Stop reason: HOSPADM

## 2019-05-11 RX ORDER — ESCITALOPRAM OXALATE 10 MG/1
5 TABLET ORAL DAILY
Status: DISCONTINUED | OUTPATIENT
Start: 2019-05-12 | End: 2019-05-17 | Stop reason: HOSPADM

## 2019-05-11 RX ORDER — DOCUSATE SODIUM 100 MG/1
100 CAPSULE, LIQUID FILLED ORAL 2 TIMES DAILY
Status: DISCONTINUED | OUTPATIENT
Start: 2019-05-11 | End: 2019-05-17 | Stop reason: HOSPADM

## 2019-05-11 RX ORDER — SODIUM CHLORIDE 0.9 % (FLUSH) 0.9 %
5-40 SYRINGE (ML) INJECTION EVERY 8 HOURS
Status: DISCONTINUED | OUTPATIENT
Start: 2019-05-11 | End: 2019-05-17 | Stop reason: HOSPADM

## 2019-05-11 RX ORDER — MORPHINE SULFATE 2 MG/ML
1 INJECTION, SOLUTION INTRAMUSCULAR; INTRAVENOUS
Status: DISCONTINUED | OUTPATIENT
Start: 2019-05-11 | End: 2019-05-17 | Stop reason: HOSPADM

## 2019-05-11 RX ORDER — ATORVASTATIN CALCIUM 40 MG/1
80 TABLET, FILM COATED ORAL DAILY
Status: DISCONTINUED | OUTPATIENT
Start: 2019-05-12 | End: 2019-05-11 | Stop reason: SDUPTHER

## 2019-05-11 RX ORDER — HEPARIN SODIUM 5000 [USP'U]/ML
5000 INJECTION, SOLUTION INTRAVENOUS; SUBCUTANEOUS EVERY 8 HOURS
Status: DISPENSED | OUTPATIENT
Start: 2019-05-11 | End: 2019-05-13

## 2019-05-11 RX ORDER — NALOXONE HYDROCHLORIDE 0.4 MG/ML
0.4 INJECTION, SOLUTION INTRAMUSCULAR; INTRAVENOUS; SUBCUTANEOUS AS NEEDED
Status: DISCONTINUED | OUTPATIENT
Start: 2019-05-11 | End: 2019-05-17 | Stop reason: HOSPADM

## 2019-05-11 RX ORDER — SODIUM CHLORIDE 0.9 % (FLUSH) 0.9 %
5-40 SYRINGE (ML) INJECTION AS NEEDED
Status: DISCONTINUED | OUTPATIENT
Start: 2019-05-11 | End: 2019-05-17 | Stop reason: HOSPADM

## 2019-05-11 RX ORDER — ATORVASTATIN CALCIUM 40 MG/1
80 TABLET, FILM COATED ORAL DAILY
Status: DISCONTINUED | OUTPATIENT
Start: 2019-05-12 | End: 2019-05-17 | Stop reason: HOSPADM

## 2019-05-11 RX ORDER — ALBUTEROL SULFATE 0.83 MG/ML
2.5 SOLUTION RESPIRATORY (INHALATION)
Status: DISCONTINUED | OUTPATIENT
Start: 2019-05-11 | End: 2019-05-11 | Stop reason: SDUPTHER

## 2019-05-11 RX ORDER — MAG HYDROX/ALUMINUM HYD/SIMETH 200-200-20
30 SUSPENSION, ORAL (FINAL DOSE FORM) ORAL
Status: DISCONTINUED | OUTPATIENT
Start: 2019-05-11 | End: 2019-05-17 | Stop reason: HOSPADM

## 2019-05-11 RX ORDER — FACIAL-BODY WIPES
10 EACH TOPICAL DAILY PRN
Status: DISCONTINUED | OUTPATIENT
Start: 2019-05-11 | End: 2019-05-17 | Stop reason: HOSPADM

## 2019-05-11 RX ORDER — ESCITALOPRAM OXALATE 10 MG/1
5 TABLET ORAL DAILY
Status: DISCONTINUED | OUTPATIENT
Start: 2019-05-12 | End: 2019-05-11 | Stop reason: SDUPTHER

## 2019-05-11 RX ORDER — ADHESIVE BANDAGE
30 BANDAGE TOPICAL DAILY PRN
Status: DISCONTINUED | OUTPATIENT
Start: 2019-05-11 | End: 2019-05-17 | Stop reason: HOSPADM

## 2019-05-11 RX ORDER — OXYCODONE HYDROCHLORIDE 5 MG/1
2.5 TABLET ORAL
Status: DISCONTINUED | OUTPATIENT
Start: 2019-05-11 | End: 2019-05-17 | Stop reason: HOSPADM

## 2019-05-11 RX ORDER — NITROGLYCERIN 0.4 MG/1
0.4 TABLET SUBLINGUAL
Status: DISCONTINUED | OUTPATIENT
Start: 2019-05-11 | End: 2019-05-11 | Stop reason: SDUPTHER

## 2019-05-11 RX ORDER — BISACODYL 5 MG
5 TABLET, DELAYED RELEASE (ENTERIC COATED) ORAL DAILY PRN
Status: DISCONTINUED | OUTPATIENT
Start: 2019-05-11 | End: 2019-05-17 | Stop reason: HOSPADM

## 2019-05-11 RX ORDER — ALBUTEROL SULFATE 0.83 MG/ML
2.5 SOLUTION RESPIRATORY (INHALATION)
Status: DISCONTINUED | OUTPATIENT
Start: 2019-05-11 | End: 2019-05-17 | Stop reason: HOSPADM

## 2019-05-11 RX ORDER — NITROGLYCERIN 0.4 MG/1
0.4 TABLET SUBLINGUAL
Status: DISCONTINUED | OUTPATIENT
Start: 2019-05-11 | End: 2019-05-17 | Stop reason: HOSPADM

## 2019-05-11 RX ORDER — ONDANSETRON 4 MG/1
4 TABLET, ORALLY DISINTEGRATING ORAL
Status: DISCONTINUED | OUTPATIENT
Start: 2019-05-11 | End: 2019-05-17 | Stop reason: HOSPADM

## 2019-05-11 RX ORDER — LEVOTHYROXINE SODIUM 75 UG/1
75 TABLET ORAL
Status: DISCONTINUED | OUTPATIENT
Start: 2019-05-12 | End: 2019-05-11 | Stop reason: SDUPTHER

## 2019-05-11 RX ORDER — FINASTERIDE 5 MG/1
5 TABLET, FILM COATED ORAL DAILY
Status: DISCONTINUED | OUTPATIENT
Start: 2019-05-12 | End: 2019-05-17 | Stop reason: HOSPADM

## 2019-05-11 RX ADMIN — Medication 10 ML: at 17:40

## 2019-05-11 RX ADMIN — HEPARIN SODIUM 5000 UNITS: 5000 INJECTION INTRAVENOUS; SUBCUTANEOUS at 17:39

## 2019-05-11 RX ADMIN — DOCUSATE SODIUM 100 MG: 100 CAPSULE, LIQUID FILLED ORAL at 17:40

## 2019-05-11 NOTE — PROGRESS NOTES
ER registration notified of direct admission's arrival.     Dr. Burnetta Goltz notified of direct admission's arrival. Call back number [phone] given.

## 2019-05-11 NOTE — PROGRESS NOTES
Orthopedic End of Shift Note    Bedside and Verbal shift change report given to Lina LITTLE (oncoming nurse) by Isis Case RN (offgoing nurse). Report included the following information SBAR, Kardex, Intake/Output, MAR, Recent Results and Cardiac Rhythm PACED. POD#     Significant issues during shift: none    Issues for Physician to address:      Activity This Shift  (check all that apply) [] chair  [] dangle   [] bathroom  [] bedside commode [] hallway  [x] bedrest   Nausea/Vomiting [] yes [x] no     Voiding Status [] void [x] Russell [] I&O Cath   Bowel Movements [] yes [x] no     Foot Pumps or SCD [x] yes [] no    Ice Pack [x] yes    [] no    Incentive Spirometer [] yes [] no Volume:      Telemetry Monitoring   [x] yes [] no Rhythm:   Supplemental O2 [x] yes [] no Sat off O2:

## 2019-05-11 NOTE — PROGRESS NOTES
Per verbal orders given by Dr. Norberto Moreno notified of pt's arrival via voicemail. Call back number [phone] given. PRINCESS Caballero also notified of pt's admission. Orders given for insertion of azar for surgery.

## 2019-05-11 NOTE — PROGRESS NOTES
Pt's daughter Antonio Adames asked who pt's normal cardiologist is. Dr. Patricia Hardin is who pt sees. Dr. Eva Mendez notified of cardiology consult due to surgery tomorrow and pt's history of pacemaker. Was informed that their practice does not have privleges on our campus currently. Was given information that pt has a metronic VVI pacemaker. Most recent NATANAEL was January 17th 2019 showed no clots and EF of 45-50%. Dr. Keisha Higuera notified of cardiology consult for surgery and pt's family request. Call back number [phone] given.

## 2019-05-11 NOTE — H&P
Hospitalist Admission Note    NAME: Sameer Burt   :  1923   MRN:  [id number]     Date/Time:  2019 3:20 PM    Patient PCP: Serafin Galindo, JEM  ______________________________________________________________________  Given the patient's current clinical presentation, I have a high level of concern for decompensation if discharged from the emergency department. Complex decision making was performed, which includes reviewing the patient's available past medical records, laboratory results, and x-ray films. My assessment of this patient's clinical condition and my plan of care is as follows. Assessment / Plan:  Closed Fracture of left Femur  GLF   H/o Afib on Long term Anticoagulation  H/o NSTEMI  CAD S/p CABGx4  Dementia      Ortho plans to do Surgery tomorrow  Family request Cardiology clearance prior to surgery  -pt was cleared by Cardiology at other Hospital  -Last Dose Eliquis 2019        Pre-op cardiac risk assessment:  Pt evaluated using revised cardiac risk index and is felt to be Low- to Moderate cardiovascular risk for Intermideiate risk surgery with a 6% risk for major complications based on these criteria. This risk has been discussed with the patient family and pt wishes to proceed. Plan for surgery without further cardiac testing if this risk is acceptable per surgery and anesthesia. Further risk reduction will involve medical management of other comorbid conditions in the perioperative period.    -Pt has been already been Cleared by Cardiologist Dr Celine Chen on 5/10/2019 at \[facility]". Code Status: Full Code  Surrogate Decision Maker: frederick Hao     DVT Prophylaxis: Heparin, holding eliquis  GI Prophylaxis: not indicated    Baseline: ambulatory with cane and walker  Lives in assisted living facility      Subjective:   CHIEF COMPLAINT: GLF, Left Femur    HISTORY OF PRESENT ILLNESS:     Kimberly Dugan is a 80 y.o.    male who was transferred from [facility]. Pt is a resident of assisted living facility, he had a mechanical fall on 9 may, and then found to have Closed left femoral Fracture. He was admitted for [facility] from May 9- May 11. Plan was to do surgery today at [facility], but Anesthesia had reservations about managing the patient at [facility] without ICU care give Cardiac History. So he was transferred to here. Currently he does not have complain, he just received morphine before transfer.                 We were asked to admit for work up and evaluation of the above problems. Past Medical History:   Diagnosis Date    Anemia     Anxiety     Arrhythmia     Afib    Chronic kidney disease     Constipation     Depression     Hypercholesterolemia     Hypertension     NSTEMI (non-ST elevated myocardial infarction) ([facility] [address] 11/21/2016    [facility]:  JACKELIN to SVG-L-PLB and JACKELIN to SVG-LADstents 11/16    Pacemaker 2009    Followed By Dr Yohannes Gee S/P CABG x 4 1995    Thyroid disease     hypothyroidism        Past Surgical History:   Procedure Laterality Date    CARDIAC SURG PROCEDURE UNLIST      pacer    CARDIAC SURG PROCEDURE UNLIST  07/20/1995    quadruple bypass     COLONOSCOPY N/A 11/6/2018    COLONOSCOPY performed by Louise Holter, [facility] at [facility]    HX CORONARY ARTERY BYPASS GRAFT  1995    4V    HX PACEMAKER  04/23/2003       Social History     Tobacco Use    Smoking status: Former Smoker    Smokeless tobacco: Never Used   Substance Use Topics    Alcohol use: Yes     Alcohol/week: 4.2 oz     Types: 7 Glasses of wine per week        Family History   Problem Relation Age of Onset    Glaucoma Mother     Heart Disease Father     Heart Disease Sister      Allergies   Allergen Reactions    Hydrocodone Nausea Only        Prior to Admission medications    Medication Sig Start Date End Date Taking? Authorizing Provider   apixaban (ELIQUIS) 2.5 mg tablet Take 2.5 mg by mouth every twelve (12) hours.    Yes Provider, Historical lactobacillus rhamnosus gg 15 billion cell (CULTURELLE) 15 billion cell capsule Take 1 Cap by mouth daily. Yes Provider, Historical   pantoprazole (PROTONIX) 40 mg tablet Take 1 Tab by mouth Daily (before breakfast). 11/8/18  Yes Дмитрий Barker MD   Ferrous Fumarate 325 mg (106 mg iron) tab Take  by mouth two (2) times a day. Yes Provider, Historical   escitalopram oxalate (LEXAPRO) 5 mg tablet Take 1 Tab by mouth daily. 3/1/17  Yes Scout Fairly, NP   loratadine (CLARITIN) 10 mg tablet Take 1 Tab by mouth daily. Indications: ALLERGIC RHINITIS 3/1/17  Yes Scout Fairly, NP   atorvastatin (LIPITOR) 80 mg tablet Take 1 Tab by mouth daily. 11/29/16  Yes Enoch Fajardo MD   finasteride (PROSCAR) 5 mg tablet Take 1 Tab by mouth daily. Indications: SYMPTOMATIC BENIGN PROSTATIC HYPERPLASIA 2/22/16  Yes Scout Fairly, NP   OTHER [employer] ([facility]) May keep wine in room. Yes Provider, Historical   multivitamin (ONE A DAY) tablet Take 1 Tab by mouth daily. Yes Provider, Historical   acetaminophen (TYLENOL) 325 mg tablet Take 650 mg by mouth every four (4) hours as needed for Pain. Take two tabs po bid. Yes Provider, Historical   levothyroxine (SYNTHROID) 75 mcg tablet Take  by mouth Daily (before breakfast). Yes Provider, Historical   OTHER as needed. Natural Laxative 30cc po every day as needed   Yes Provider, Historical   alum-mag hydroxide-simeth (MAALOX ADVANCED) 200-200-20 mg/5 mL susp Take 30 mL by mouth every four (4) hours as needed. Yes Provider, Historical   loperamide (IMODIUM) 2 mg capsule Take  by mouth. Provider, Historical   acetaminophen (TYLENOL) 650 mg suppository Insert 650 mg into rectum every four (4) hours as needed for Fever. Provider, Historical   bisacodyl (DULCOLAX, BISACODYL,) 10 mg suppository Insert 10 mg into rectum as needed.     Provider, Historical   neomycin-bacitracin-polymyxin (TRIPLE ANTIBIOTIC) 3.5mg-400 unit- 5,000 unit/gram ointment Apply 1 Each to affected area as needed (to wound tear/abrasion). Provider, Historical   nitroglycerin (NITROSTAT) 0.4 mg SL tablet by SubLINGual route every five (5) minutes as needed for Chest Pain (not to exceed 3 doses). Provider, Historical   albuterol (PROVENTIL VENTOLIN) 2.5 mg /3 mL (0.083 %) nebulizer solution 2.5 mg by Nebulization route as needed for Wheezing. Every 6 hrs prn SOB. Provider, Historical   Protein Supplement liqd Take 1 CUP by mouth two (2) times a day. 1/13/16   Megan Emery NP   cyanocobalamin (VITAMIN B12) 1,000 mcg/mL injection 1,000 mcg by IntraMUSCular route. Every 3 weeks    Provider, Historical   mineral oil (FLEET) enema Insert  into rectum as needed for Constipation (daily as needed). Provider, Historical   magnesium hydroxide (CLEMENS MILK OF MAGNESIA) 400 mg/5 mL suspension Take 30 mL by mouth daily as needed for Constipation. Provider, Historical       REVIEW OF SYSTEMS:     I am not able to complete the review of systems because:    The patient is intubated and sedated    The patient has altered mental status due to his acute medical problems    The patient has baseline aphasia from prior stroke(s)    The patient has baseline dementia and is not reliable historian    The patient is in acute medical distress and unable to provide information           Total of 12 systems reviewed as follows:       POSITIVE= underlined text  Negative = text not underlined  General:  fever, chills, sweats, generalized weakness, weight loss/gain,      loss of appetite   Eyes:    blurred vision, eye pain, loss of vision, double vision  ENT:    rhinorrhea, pharyngitis   Respiratory:   cough, sputum production, SOB, YOUNG, wheezing, pleuritic pain   Cardiology:   chest pain, palpitations, orthopnea, PND, edema, syncope   Gastrointestinal:  abdominal pain , N/V, diarrhea, dysphagia, constipation, bleeding   Genitourinary:  frequency, urgency, dysuria, hematuria, incontinence Muskuloskeletal :  arthralgia, myalgia, back pain  Hematology:  easy bruising, nose or gum bleeding, lymphadenopathy   Dermatological: rash, ulceration, pruritis, color change / jaundice  Endocrine:   hot flashes or polydipsia   Neurological:  headache, dizziness, confusion, focal weakness, paresthesia,     Speech difficulties, memory loss, gait difficulty  Psychological: Feelings of anxiety, depression, agitation    Objective:   VITALS:    Visit Vitals  /46   Pulse 65   Temp 98.5 °F (36.9 °C)   Resp 18   SpO2 92%       PHYSICAL EXAM:    General:    Alert, cooperative, no distress, appears stated age. HEENT: Atraumatic, anicteric sclerae, pink conjunctivae     No oral ulcers, mucosa moist, throat clear, dentition fair  Neck:  Supple, symmetrical,  thyroid: non tender  Lungs:   Clear to auscultation bilaterally. No Wheezing or Rhonchi. No rales. Chest wall:  No tenderness  No Accessory muscle use. Heart:   Regular  rhythm,  No  murmur   No edema  Abdomen:   Soft, non-tender. Not distended. Bowel sounds normal  Extremities: No cyanosis. No clubbing,        Skin:     Not pale. Not Jaundiced  No rashes   Psych:  Good insight. Not depressed. Not anxious or agitated. Neurologic: EOMs intact. No facial asymmetry. No aphasia or slurred speech. Symmetrical strength, Sensation grossly intact.  Alert and oriented X 4.     _______________________________________________________________________  Care Plan discussed with:    Comments   Patient x    Family  x Daughter   RN     Care Manager                    Consultant:      _______________________________________________________________________  Expected  Disposition:   Home with Family    HH/PT/OT/RN    SNF/LTC    BLANE    ________________________________________________________________________  TOTAL TIME:  48 Minutes    Critical Care Provided     Minutes non procedure based      Comments     Reviewed previous records   >50% of visit spent in counseling and coordination of care  Discussion with patient and/or family and questions answered       ________________________________________________________________________  Signed: Essence Lopez MD    Procedures: see electronic medical records for all procedures/Xrays and details which were not copied into this note but were reviewed prior to creation of Plan.     LAB DATA REVIEWED:    Recent Results (from the past 24 hour(s))   METABOLIC PANEL, BASIC    Collection Time: 05/11/19  6:08 AM   Result Value Ref Range    Sodium 143 136 - 145 mmol/L    Potassium 4.1 3.5 - 5.1 mmol/L    Chloride 108 97 - 108 mmol/L    CO2 27 21 - 32 mmol/L    Anion gap 8 5 - 15 mmol/L    Glucose 96 65 - 100 mg/dL    BUN 26 (H) 6 - 20 MG/DL    Creatinine 1.34 (H) 0.70 - 1.30 MG/DL    BUN/Creatinine ratio 19 12 - 20      GFR est AA 60 (L) >60 ml/min/1.73m2    GFR est non-AA 49 (L) >60 ml/min/1.73m2    Calcium 8.6 8.5 - 10.1 MG/DL   CBC W/O DIFF    Collection Time: 05/11/19  6:08 AM   Result Value Ref Range    WBC 9.5 4.1 - 11.1 K/uL    RBC 3.54 (L) 4.10 - 5.70 M/uL    HGB 10.8 (L) 12.1 - 17.0 g/dL    HCT 34.0 (L) 36.6 - 50.3 %    MCV 96.0 80.0 - 99.0 FL    MCH 30.5 26.0 - 34.0 PG    MCHC 31.8 30.0 - 36.5 g/dL    RDW 14.2 11.5 - 14.5 %    PLATELET 96 (L) 432 - 400 K/uL    MPV 11.0 8.9 - 12.9 FL    NRBC 0.0 0  WBC    ABSOLUTE NRBC 0.00 0.00 - 0.01 K/uL

## 2019-05-11 NOTE — CONSULTS
ORTHOPAEDIC CONSULT NOTE    Subjective:     Date of Consultation:  May 11, 2019      Guera Tyler is a 80 y.o. male who is being seen for left hip fracture. Transferred from [facility]. Pt is a resident of assisted living facility, GLF fall on 5/9, admitted for 5/9-5/11. Was felt to be too high risk to proceed with SX at [facility] due to anemia- need for transfusion, A-fib, chronic Eliquis(last dose 5/9), cardiac history-CABG/pacemaker, low O2 sat, CKD, no ICU. So he was transferred to [facility]. Ambulates at baseline w/ cane/walker depending on setting, active at assisted living facility, still plays golf, per family. Patient Active Problem List    Diagnosis Date Noted    Closed left femoral fracture ([facility] 05/11/2019    Closed fracture of neck of left femur ([facility] 05/09/2019    Current use of long term anticoagulation 05/09/2019    Femoral neck fracture (HCC) 05/09/2019    GI bleed 11/02/2018    Iron deficiency anemia 12/07/2017    Anemia in stage 3 chronic kidney disease ([facility] 12/07/2017    NSTEMI (non-ST elevated myocardial infarction) ([facility] 11/21/2016    Dementia 05/06/2015    Atrial fibrillation ([facility] 05/06/2015    Pacemaker 01/01/2009    S/P CABG x 4 01/01/1995     Family History   Problem Relation Age of Onset    Glaucoma Mother     Heart Disease Father     Heart Disease Sister       Social History     Tobacco Use    Smoking status: Former Smoker    Smokeless tobacco: Never Used   Substance Use Topics    Alcohol use:  Yes     Alcohol/week: 4.2 oz     Types: 7 Glasses of wine per week     Past Medical History:   Diagnosis Date    Anemia     Anxiety     Arrhythmia     Afib    Chronic kidney disease     Constipation     Depression     Hypercholesterolemia     Hypertension     NSTEMI (non-ST elevated myocardial infarction) ([facility] 11/21/2016    [facility]:  JACKELIN to SVG-L-PLB and JACKELIN to SVG-LADstents 11/16    Pacemaker 2009    Followed By Dr Johann Sultana S/P CABG x 4 1995    Thyroid disease hypothyroidism      Past Surgical History:   Procedure Laterality Date    CARDIAC SURG PROCEDURE UNLIST      pacer    CARDIAC SURG PROCEDURE UNLIST  07/20/1995    quadruple bypass     COLONOSCOPY N/A 11/6/2018    COLONOSCOPY performed [facility] Alexia [facility] ENDOSCOPY    HX CORONARY ARTERY BYPASS GRAFT  1995    4V    HX PACEMAKER  04/23/2003      Prior to Admission medications    Medication Sig Start Date End Date Taking? Authorizing Provider   apixaban (ELIQUIS) 2.5 mg tablet Take 2.5 mg by mouth every twelve (12) hours. Yes Provider, Historical   lactobacillus rhamnosus gg 15 billion cell (CULTURELLE) 15 billion cell capsule Take 1 Cap by mouth daily. Yes Provider, Historical   pantoprazole (PROTONIX) 40 mg tablet Take 1 Tab by mouth Daily (before breakfast). 11/8/18  Yes Kierra Hayes MD   Ferrous Fumarate 325 mg (106 mg iron) tab Take  by mouth two (2) times a day. Yes Provider, Historical   escitalopram oxalate (LEXAPRO) 5 mg tablet Take 1 Tab by mouth daily. 3/1/17  Yes Souleymane Silva NP   loratadine (CLARITIN) 10 mg tablet Take 1 Tab by mouth daily. Indications: ALLERGIC RHINITIS 3/1/17  Yes Souleymane Silva NP   atorvastatin (LIPITOR) 80 mg tablet Take 1 Tab by mouth daily. 11/29/16  Yes Stefany Villalpando MD   finasteride (PROSCAR) 5 mg tablet Take 1 Tab by mouth daily. Indications: SYMPTOMATIC BENIGN PROSTATIC HYPERPLASIA 2/22/16  Yes Souleymane Silva NP   OTHER [employer] ([address] [place] [address]) May keep wine in room. Yes Provider, Historical   multivitamin (ONE A DAY) tablet Take 1 Tab by mouth daily. Yes Provider, Historical   acetaminophen (TYLENOL) 325 mg tablet Take 650 mg by mouth every four (4) hours as needed for Pain. Take two tabs po bid. Yes Provider, Historical   levothyroxine (SYNTHROID) 75 mcg tablet Take  by mouth Daily (before breakfast). Yes Provider, Historical   OTHER as needed.  Natural Laxative 30cc po every day as needed   Yes Provider, Historical   alum-mag hydroxide-simeth (MAALOX ADVANCED) 200-200-20 mg/5 mL susp Take 30 mL by mouth every four (4) hours as needed. Yes Provider, Historical   loperamide (IMODIUM) 2 mg capsule Take  by mouth. Provider, Historical   acetaminophen (TYLENOL) 650 mg suppository Insert 650 mg into rectum every four (4) hours as needed for Fever. Provider, Historical   bisacodyl (DULCOLAX, BISACODYL,) 10 mg suppository Insert 10 mg into rectum as needed. Provider, Historical   neomycin-bacitracin-polymyxin (TRIPLE ANTIBIOTIC) 3.5mg-400 unit- 5,000 unit/gram ointment Apply 1 Each to affected area as needed (to wound tear/abrasion). Provider, Historical   nitroglycerin (NITROSTAT) 0.4 mg SL tablet by SubLINGual route every five (5) minutes as needed for Chest Pain (not to exceed 3 doses). Provider, Historical   albuterol (PROVENTIL VENTOLIN) 2.5 mg /3 mL (0.083 %) nebulizer solution 2.5 mg by Nebulization route as needed for Wheezing. Every 6 hrs prn SOB. Provider, Historical   Protein Supplement liqd Take 1 CUP by mouth two (2) times a day. 1/13/16   Jourdan Cheng NP   cyanocobalamin (VITAMIN B12) 1,000 mcg/mL injection 1,000 mcg by IntraMUSCular route. Every 3 weeks    Provider, Historical   mineral oil (FLEET) enema Insert  into rectum as needed for Constipation (daily as needed). Provider, Historical   magnesium hydroxide (CLEMENS MILK OF MAGNESIA) 400 mg/5 mL suspension Take 30 mL by mouth daily as needed for Constipation.     Provider, Historical     Current Facility-Administered Medications   Medication Dose Route Frequency    sodium chloride (NS) flush 5-40 mL  5-40 mL IntraVENous PRN    bisacodyl (DULCOLAX) tablet 5 mg  5 mg Oral DAILY PRN    heparin (porcine) injection 5,000 Units  5,000 Units SubCUTAneous Q8H    acetaminophen (TYLENOL) tablet 650 mg  650 mg Oral Q4H PRN    [START ON 5/12/2019] levothyroxine (SYNTHROID) tablet 75 mcg  75 mcg Oral ACB    nitroglycerin (NITROSTAT) tablet 0.4 mg  0.4 mg SubLINGual Q5MIN PRN    docusate sodium (COLACE) capsule 100 mg  100 mg Oral BID    naloxone (NARCAN) injection 0.4 mg  0.4 mg IntraVENous PRN    ondansetron (ZOFRAN ODT) tablet 4 mg  4 mg Oral Q8H PRN    sodium chloride (NS) flush 5-40 mL  5-40 mL IntraVENous Q8H    albuterol (PROVENTIL VENTOLIN) nebulizer solution 2.5 mg  2.5 mg Nebulization Q4H PRN    alum-mag hydroxide-simeth (MYLANTA) oral suspension 30 mL  30 mL Oral Q4H PRN    [START ON 2019] atorvastatin (LIPITOR) tablet 80 mg  80 mg Oral DAILY    bisacodyl (DULCOLAX) suppository 10 mg  10 mg Rectal DAILY PRN    [START ON 2019] escitalopram oxalate (LEXAPRO) tablet 5 mg  5 mg Oral DAILY    [START ON 2019] finasteride (PROSCAR) tablet 5 mg  5 mg Oral DAILY    [START ON 2019] lactobac ac& pc-s.therm-b.anim (ALINE Q/RISAQUAD)  1 Cap Oral DAILY    magnesium hydroxide (MILK OF MAGNESIA) 400 mg/5 mL oral suspension 30 mL  30 mL Oral DAILY PRN    morphine injection 1 mg  1 mg IntraVENous Q3H PRN    [START ON 2019] multivitamin, tx-iron-ca-min (THERA-M w/ IRON) tablet 1 Tab  1 Tab Oral DAILY    [START ON 2019] pantoprazole (PROTONIX) tablet 40 mg  40 mg Oral ACB    oxyCODONE IR (ROXICODONE) tablet 2.5 mg  2.5 mg Oral Q4H PRN      Allergies   Allergen Reactions    Hydrocodone Nausea Only        Review of Systems:  A comprehensive review of systems was negative except for that written in the HPI.     Mental Status: mild dementia    Objective:     Patient Vitals for the past 8 hrs:   BP Temp Pulse Resp SpO2   19 1421 124/46 98.5 °F (36.9 °C) 65 18 92 %     Temp (24hrs), Av.6 °F (37 °C), Min:98 °F (36.7 °C), Max:99.4 °F (37.4 °C)      Gen: Well-developed,  in no acute distress   HEENT: Pink conjunctivae, hearing intact to voice, moist mucous membranes   Neck: Supple  Resp: No respiratory distress   Card: RRR, palpable distal pulse-equal bilaterally, birsk cap refill all distal digits   Abd:  non-distended  Musc: LLE shortened, active plant/dorsiflexion of the ankle. No sign of LE VTE. Skin: No skin breakdown noted. Skin warm, pink, dry  Neuro: Cranial nerves are grossly intact, no focal motor weakness, follows commands appropriately   Psych: poor insight, oriented to person, place, alert    Imaging Review: XR HIP LT W OR WO PELV 2-3 VWS   INDICATION: Left hip pain and left femoral neck fracture.  COMPARISON: Pelvis and left hip views on the same day at 1852 hours.   TECHNIQUE: 3 3 images of 2 views left hip.   FINDINGS: Mildly displaced left femoral neck fracture with mild impaction. No evidence of underlying bone lesion. Moderate left hip osteoarthritis. Vascular calcifications. No change.   IMPRESSION:    Mildly displaced, mildly impacted left femoral neck fracture. Moderate left hip osteoarthritis.     Labs:   Recent Results (from the past 24 hour(s))   METABOLIC PANEL, BASIC    Collection Time: 05/11/19  6:08 AM   Result Value Ref Range    Sodium 143 136 - 145 mmol/L    Potassium 4.1 3.5 - 5.1 mmol/L    Chloride 108 97 - 108 mmol/L    CO2 27 21 - 32 mmol/L    Anion gap 8 5 - 15 mmol/L    Glucose 96 65 - 100 mg/dL    BUN 26 (H) 6 - 20 MG/DL    Creatinine 1.34 (H) 0.70 - 1.30 MG/DL    BUN/Creatinine ratio 19 12 - 20      GFR est AA 60 (L) >60 ml/min/1.73m2    GFR est non-AA 49 (L) >60 ml/min/1.73m2    Calcium 8.6 8.5 - 10.1 MG/DL   CBC W/O DIFF    Collection Time: 05/11/19  6:08 AM   Result Value Ref Range    WBC 9.5 4.1 - 11.1 K/uL    RBC 3.54 (L) 4.10 - 5.70 M/uL    HGB 10.8 (L) 12.1 - 17.0 g/dL    HCT 34.0 (L) 36.6 - 50.3 %    MCV 96.0 80.0 - 99.0 FL    MCH 30.5 26.0 - 34.0 PG    MCHC 31.8 30.0 - 36.5 g/dL    RDW 14.2 11.5 - 14.5 %    PLATELET 96 (L) 151 - 400 K/uL    MPV 11.0 8.9 - 12.9 FL    NRBC 0.0 0  WBC    ABSOLUTE NRBC 0.00 0.00 - 0.01 K/uL         Impression:     Patient Active Problem List    Diagnosis Date Noted    Closed left femoral fracture (HCC) 05/11/2019    Closed fracture of neck of left femur ([facility] 05/09/2019    Current use of long term anticoagulation 05/09/2019    Femoral neck fracture (HCC) 05/09/2019    GI bleed 11/02/2018    Iron deficiency anemia 12/07/2017    Anemia in stage 3 chronic kidney disease ([facility] 12/07/2017    NSTEMI (non-ST elevated myocardial infarction) ([facility] 11/21/2016    Dementia 05/06/2015    Atrial fibrillation ([facility] 05/06/2015    Pacemaker 01/01/2009    S/P CABG x 4 01/01/1995     Active Problems:    Closed left femoral fracture (HCC) (5/11/2019)        Plan:     -  Hb stable--- 11.5 at admission,  10.8 today  -  Cardio consult pending- cleared by Dr Paola Lee at [facility] 5/10  -  NPO after midnight, bedrest  -  Plan for hemiarthroplasty Sunday   -  Medicine for Pre-Operative Clearence/ Post-Operative management.     -  DVT Prophylaxis - SCDs heparin- last dose this 1800  -  D/C planning SNF likely       Dr. Escalona Purchase  aware and agrees with plan as above.         Melody Ortega PA-C  [employer]

## 2019-05-12 ENCOUNTER — ANESTHESIA EVENT (OUTPATIENT)
Dept: SURGERY | Age: 84
DRG: 470 | End: 2019-05-12
Payer: MEDICARE

## 2019-05-12 ENCOUNTER — ANESTHESIA (OUTPATIENT)
Dept: SURGERY | Age: 84
DRG: 470 | End: 2019-05-12
Payer: MEDICARE

## 2019-05-12 ENCOUNTER — APPOINTMENT (OUTPATIENT)
Dept: GENERAL RADIOLOGY | Age: 84
DRG: 470 | End: 2019-05-12
Attending: ORTHOPAEDIC SURGERY
Payer: MEDICARE

## 2019-05-12 LAB
ALBUMIN SERPL-MCNC: 2.9 G/DL (ref 3.5–5)
ALBUMIN/GLOB SERPL: 0.7 {RATIO} (ref 1.1–2.2)
ALP SERPL-CCNC: 98 U/L (ref 45–117)
ALT SERPL-CCNC: 15 U/L (ref 12–78)
ANION GAP SERPL CALC-SCNC: 10 MMOL/L (ref 5–15)
AST SERPL-CCNC: 25 U/L (ref 15–37)
BASOPHILS # BLD: 0 K/UL (ref 0–0.1)
BASOPHILS NFR BLD: 0 % (ref 0–1)
BILIRUB SERPL-MCNC: 1.1 MG/DL (ref 0.2–1)
BUN SERPL-MCNC: 29 MG/DL (ref 6–20)
BUN/CREAT SERPL: 25 (ref 12–20)
CALCIUM SERPL-MCNC: 8 MG/DL (ref 8.5–10.1)
CHLORIDE SERPL-SCNC: 109 MMOL/L (ref 97–108)
CO2 SERPL-SCNC: 23 MMOL/L (ref 21–32)
CREAT SERPL-MCNC: 1.17 MG/DL (ref 0.7–1.3)
DIFFERENTIAL METHOD BLD: ABNORMAL
EOSINOPHIL # BLD: 0.8 K/UL (ref 0–0.4)
EOSINOPHIL NFR BLD: 8 % (ref 0–7)
ERYTHROCYTE [DISTWIDTH] IN BLOOD BY AUTOMATED COUNT: 14.1 % (ref 11.5–14.5)
GLOBULIN SER CALC-MCNC: 3.9 G/DL (ref 2–4)
GLUCOSE SERPL-MCNC: 106 MG/DL (ref 65–100)
HCT VFR BLD AUTO: 32.4 % (ref 36.6–50.3)
HGB BLD-MCNC: 10.6 G/DL (ref 12.1–17)
IMM GRANULOCYTES # BLD AUTO: 0 K/UL (ref 0–0.04)
IMM GRANULOCYTES NFR BLD AUTO: 0 % (ref 0–0.5)
LYMPHOCYTES # BLD: 1.2 K/UL (ref 0.8–3.5)
LYMPHOCYTES NFR BLD: 13 % (ref 12–49)
MCH RBC QN AUTO: 30.5 PG (ref 26–34)
MCHC RBC AUTO-ENTMCNC: 32.7 G/DL (ref 30–36.5)
MCV RBC AUTO: 93.4 FL (ref 80–99)
MONOCYTES # BLD: 0.9 K/UL (ref 0–1)
MONOCYTES NFR BLD: 11 % (ref 5–13)
NEUTS SEG # BLD: 6 K/UL (ref 1.8–8)
NEUTS SEG NFR BLD: 68 % (ref 32–75)
NRBC # BLD: 0 K/UL (ref 0–0.01)
NRBC BLD-RTO: 0 PER 100 WBC
PLATELET # BLD AUTO: 95 K/UL (ref 150–400)
PMV BLD AUTO: 10.9 FL (ref 8.9–12.9)
POTASSIUM SERPL-SCNC: 3.9 MMOL/L (ref 3.5–5.1)
PROT SERPL-MCNC: 6.8 G/DL (ref 6.4–8.2)
RBC # BLD AUTO: 3.47 M/UL (ref 4.1–5.7)
SODIUM SERPL-SCNC: 142 MMOL/L (ref 136–145)
WBC # BLD AUTO: 8.9 K/UL (ref 4.1–11.1)

## 2019-05-12 PROCEDURE — 77030003028 HC SUT VCRL J&J -A: Performed by: ORTHOPAEDIC SURGERY

## 2019-05-12 PROCEDURE — 77030002922 HC SUT FBRWRE ARTH -B: Performed by: ORTHOPAEDIC SURGERY

## 2019-05-12 PROCEDURE — 85025 COMPLETE CBC W/AUTO DIFF WBC: CPT

## 2019-05-12 PROCEDURE — 74011250636 HC RX REV CODE- 250/636: Performed by: ANESTHESIOLOGY

## 2019-05-12 PROCEDURE — 76210000006 HC OR PH I REC 0.5 TO 1 HR: Performed by: ORTHOPAEDIC SURGERY

## 2019-05-12 PROCEDURE — 80053 COMPREHEN METABOLIC PANEL: CPT

## 2019-05-12 PROCEDURE — 77030018547 HC SUT ETHBND1 J&J -B: Performed by: ORTHOPAEDIC SURGERY

## 2019-05-12 PROCEDURE — C1776 JOINT DEVICE (IMPLANTABLE): HCPCS | Performed by: ORTHOPAEDIC SURGERY

## 2019-05-12 PROCEDURE — 74011000250 HC RX REV CODE- 250

## 2019-05-12 PROCEDURE — 65660000000 HC RM CCU STEPDOWN

## 2019-05-12 PROCEDURE — 77030008684 HC TU ET CUF COVD -B: Performed by: NURSE ANESTHETIST, CERTIFIED REGISTERED

## 2019-05-12 PROCEDURE — 74011250636 HC RX REV CODE- 250/636: Performed by: PHYSICIAN ASSISTANT

## 2019-05-12 PROCEDURE — 74011250636 HC RX REV CODE- 250/636: Performed by: ORTHOPAEDIC SURGERY

## 2019-05-12 PROCEDURE — 77030008467 HC STPLR SKN COVD -B: Performed by: ORTHOPAEDIC SURGERY

## 2019-05-12 PROCEDURE — 77030036660

## 2019-05-12 PROCEDURE — 72170 X-RAY EXAM OF PELVIS: CPT

## 2019-05-12 PROCEDURE — 36415 COLL VENOUS BLD VENIPUNCTURE: CPT

## 2019-05-12 PROCEDURE — 94760 N-INVAS EAR/PLS OXIMETRY 1: CPT

## 2019-05-12 PROCEDURE — 0SRS0JA REPLACEMENT OF LEFT HIP JOINT, FEMORAL SURFACE WITH SYNTHETIC SUBSTITUTE, UNCEMENTED, OPEN APPROACH: ICD-10-PCS | Performed by: ORTHOPAEDIC SURGERY

## 2019-05-12 PROCEDURE — 77030003029 HC SUT VCRL J&J -B: Performed by: ORTHOPAEDIC SURGERY

## 2019-05-12 PROCEDURE — 76060000034 HC ANESTHESIA 1.5 TO 2 HR: Performed by: ORTHOPAEDIC SURGERY

## 2019-05-12 PROCEDURE — 77010033678 HC OXYGEN DAILY

## 2019-05-12 PROCEDURE — 76010000153 HC OR TIME 1.5 TO 2 HR: Performed by: ORTHOPAEDIC SURGERY

## 2019-05-12 PROCEDURE — 74011250636 HC RX REV CODE- 250/636

## 2019-05-12 PROCEDURE — 77030026438 HC STYL ET INTUB CARD -A: Performed by: NURSE ANESTHETIST, CERTIFIED REGISTERED

## 2019-05-12 PROCEDURE — 77030013708 HC HNDPC SUC IRR PULS STRY –B: Performed by: ORTHOPAEDIC SURGERY

## 2019-05-12 PROCEDURE — 77030013079 HC BLNKT BAIR HGGR 3M -A: Performed by: NURSE ANESTHETIST, CERTIFIED REGISTERED

## 2019-05-12 PROCEDURE — 77030018846 HC SOL IRR STRL H20 ICUM -A: Performed by: ORTHOPAEDIC SURGERY

## 2019-05-12 PROCEDURE — 77030020782 HC GWN BAIR PAWS FLX 3M -B

## 2019-05-12 PROCEDURE — 77030006835 HC BLD SAW SAG STRY -B: Performed by: ORTHOPAEDIC SURGERY

## 2019-05-12 PROCEDURE — 77030018836 HC SOL IRR NACL ICUM -A: Performed by: ORTHOPAEDIC SURGERY

## 2019-05-12 PROCEDURE — 77030011640 HC PAD GRND REM COVD -A: Performed by: ORTHOPAEDIC SURGERY

## 2019-05-12 PROCEDURE — 77030018673: Performed by: ORTHOPAEDIC SURGERY

## 2019-05-12 DEVICE — IMPLANTABLE DEVICE: Type: IMPLANTABLE DEVICE | Site: HIP | Status: FUNCTIONAL

## 2019-05-12 DEVICE — HIP PRI  POR FEM UNIPLR ECHO -- TAPR INSRT ENDO II H5: Type: IMPLANTABLE DEVICE | Site: HIP | Status: FUNCTIONAL

## 2019-05-12 RX ORDER — FERROUS SULFATE, DRIED 160(50) MG
1 TABLET, EXTENDED RELEASE ORAL
Status: DISCONTINUED | OUTPATIENT
Start: 2019-05-13 | End: 2019-05-17 | Stop reason: HOSPADM

## 2019-05-12 RX ORDER — FACIAL-BODY WIPES
10 EACH TOPICAL AS NEEDED
Status: DISCONTINUED | OUTPATIENT
Start: 2019-05-12 | End: 2019-05-12 | Stop reason: SDUPTHER

## 2019-05-12 RX ORDER — CYANOCOBALAMIN 1000 UG/ML
1000 INJECTION, SOLUTION INTRAMUSCULAR; SUBCUTANEOUS
Status: DISCONTINUED | OUTPATIENT
Start: 2019-05-24 | End: 2019-05-13

## 2019-05-12 RX ORDER — HYDRALAZINE HYDROCHLORIDE 20 MG/ML
10 INJECTION INTRAMUSCULAR; INTRAVENOUS
Status: DISCONTINUED | OUTPATIENT
Start: 2019-05-12 | End: 2019-05-17 | Stop reason: HOSPADM

## 2019-05-12 RX ORDER — POLYETHYLENE GLYCOL 3350 17 G/17G
17 POWDER, FOR SOLUTION ORAL DAILY
Status: DISCONTINUED | OUTPATIENT
Start: 2019-05-13 | End: 2019-05-17 | Stop reason: HOSPADM

## 2019-05-12 RX ORDER — ENOXAPARIN SODIUM 100 MG/ML
40 INJECTION SUBCUTANEOUS DAILY
Status: DISCONTINUED | OUTPATIENT
Start: 2019-05-13 | End: 2019-05-14

## 2019-05-12 RX ORDER — LORATADINE 10 MG/1
10 TABLET ORAL DAILY
Status: DISCONTINUED | OUTPATIENT
Start: 2019-05-13 | End: 2019-05-17 | Stop reason: HOSPADM

## 2019-05-12 RX ORDER — DIPHENHYDRAMINE HYDROCHLORIDE 50 MG/ML
12.5 INJECTION, SOLUTION INTRAMUSCULAR; INTRAVENOUS
Status: DISCONTINUED | OUTPATIENT
Start: 2019-05-12 | End: 2019-05-12

## 2019-05-12 RX ORDER — SODIUM CHLORIDE 0.9 % (FLUSH) 0.9 %
5-40 SYRINGE (ML) INJECTION EVERY 8 HOURS
Status: DISCONTINUED | OUTPATIENT
Start: 2019-05-12 | End: 2019-05-12

## 2019-05-12 RX ORDER — SODIUM CHLORIDE, SODIUM LACTATE, POTASSIUM CHLORIDE, CALCIUM CHLORIDE 600; 310; 30; 20 MG/100ML; MG/100ML; MG/100ML; MG/100ML
25 INJECTION, SOLUTION INTRAVENOUS CONTINUOUS
Status: DISCONTINUED | OUTPATIENT
Start: 2019-05-12 | End: 2019-05-12

## 2019-05-12 RX ORDER — SODIUM CHLORIDE 0.9 % (FLUSH) 0.9 %
5-40 SYRINGE (ML) INJECTION AS NEEDED
Status: DISCONTINUED | OUTPATIENT
Start: 2019-05-12 | End: 2019-05-12 | Stop reason: SDUPTHER

## 2019-05-12 RX ORDER — PANTOPRAZOLE SODIUM 40 MG/1
40 TABLET, DELAYED RELEASE ORAL
Status: DISCONTINUED | OUTPATIENT
Start: 2019-05-13 | End: 2019-05-12 | Stop reason: SDUPTHER

## 2019-05-12 RX ORDER — ETOMIDATE 2 MG/ML
INJECTION INTRAVENOUS AS NEEDED
Status: DISCONTINUED | OUTPATIENT
Start: 2019-05-12 | End: 2019-05-12 | Stop reason: HOSPADM

## 2019-05-12 RX ORDER — DIPHENHYDRAMINE HCL 12.5MG/5ML
12.5 ELIXIR ORAL
Status: ACTIVE | OUTPATIENT
Start: 2019-05-12 | End: 2019-05-13

## 2019-05-12 RX ORDER — ACETAMINOPHEN 500 MG
1000 TABLET ORAL EVERY 6 HOURS
Status: DISCONTINUED | OUTPATIENT
Start: 2019-05-12 | End: 2019-05-17 | Stop reason: HOSPADM

## 2019-05-12 RX ORDER — CEFAZOLIN SODIUM/WATER 2 G/20 ML
SYRINGE (ML) INTRAVENOUS
Status: COMPLETED
Start: 2019-05-12 | End: 2019-05-12

## 2019-05-12 RX ORDER — FENTANYL CITRATE 50 UG/ML
INJECTION, SOLUTION INTRAMUSCULAR; INTRAVENOUS AS NEEDED
Status: DISCONTINUED | OUTPATIENT
Start: 2019-05-12 | End: 2019-05-12 | Stop reason: HOSPADM

## 2019-05-12 RX ORDER — NEOSTIGMINE METHYLSULFATE 1 MG/ML
INJECTION INTRAVENOUS AS NEEDED
Status: DISCONTINUED | OUTPATIENT
Start: 2019-05-12 | End: 2019-05-12 | Stop reason: HOSPADM

## 2019-05-12 RX ORDER — SODIUM CHLORIDE 0.9 % (FLUSH) 0.9 %
5-40 SYRINGE (ML) INJECTION EVERY 8 HOURS
Status: DISCONTINUED | OUTPATIENT
Start: 2019-05-12 | End: 2019-05-12 | Stop reason: SDUPTHER

## 2019-05-12 RX ORDER — SODIUM CHLORIDE 9 MG/ML
125 INJECTION, SOLUTION INTRAVENOUS CONTINUOUS
Status: DISPENSED | OUTPATIENT
Start: 2019-05-12 | End: 2019-05-13

## 2019-05-12 RX ORDER — MAG HYDROX/ALUMINUM HYD/SIMETH 200-200-20
30 SUSPENSION, ORAL (FINAL DOSE FORM) ORAL
Status: DISCONTINUED | OUTPATIENT
Start: 2019-05-12 | End: 2019-05-12 | Stop reason: SDUPTHER

## 2019-05-12 RX ORDER — ONDANSETRON 2 MG/ML
4 INJECTION INTRAMUSCULAR; INTRAVENOUS
Status: ACTIVE | OUTPATIENT
Start: 2019-05-12 | End: 2019-05-13

## 2019-05-12 RX ORDER — KETOROLAC TROMETHAMINE 30 MG/ML
15 INJECTION, SOLUTION INTRAMUSCULAR; INTRAVENOUS EVERY 6 HOURS
Status: DISCONTINUED | OUTPATIENT
Start: 2019-05-12 | End: 2019-05-12

## 2019-05-12 RX ORDER — OXYCODONE HYDROCHLORIDE 5 MG/1
2.5 TABLET ORAL
Status: DISCONTINUED | OUTPATIENT
Start: 2019-05-12 | End: 2019-05-12 | Stop reason: SDUPTHER

## 2019-05-12 RX ORDER — ONDANSETRON 2 MG/ML
4 INJECTION INTRAMUSCULAR; INTRAVENOUS AS NEEDED
Status: DISCONTINUED | OUTPATIENT
Start: 2019-05-12 | End: 2019-05-12

## 2019-05-12 RX ORDER — PHENYLEPHRINE HCL IN 0.9% NACL 0.4MG/10ML
SYRINGE (ML) INTRAVENOUS AS NEEDED
Status: DISCONTINUED | OUTPATIENT
Start: 2019-05-12 | End: 2019-05-12 | Stop reason: HOSPADM

## 2019-05-12 RX ORDER — LIDOCAINE HYDROCHLORIDE 20 MG/ML
INJECTION, SOLUTION EPIDURAL; INFILTRATION; INTRACAUDAL; PERINEURAL AS NEEDED
Status: DISCONTINUED | OUTPATIENT
Start: 2019-05-12 | End: 2019-05-12 | Stop reason: HOSPADM

## 2019-05-12 RX ORDER — LANOLIN ALCOHOL/MO/W.PET/CERES
325 CREAM (GRAM) TOPICAL DAILY
Status: DISCONTINUED | OUTPATIENT
Start: 2019-05-13 | End: 2019-05-17 | Stop reason: HOSPADM

## 2019-05-12 RX ORDER — ROCURONIUM BROMIDE 10 MG/ML
INJECTION, SOLUTION INTRAVENOUS AS NEEDED
Status: DISCONTINUED | OUTPATIENT
Start: 2019-05-12 | End: 2019-05-12 | Stop reason: HOSPADM

## 2019-05-12 RX ORDER — SODIUM CHLORIDE 0.9 % (FLUSH) 0.9 %
5-40 SYRINGE (ML) INJECTION AS NEEDED
Status: DISCONTINUED | OUTPATIENT
Start: 2019-05-12 | End: 2019-05-12

## 2019-05-12 RX ORDER — NALOXONE HYDROCHLORIDE 0.4 MG/ML
0.4 INJECTION, SOLUTION INTRAMUSCULAR; INTRAVENOUS; SUBCUTANEOUS AS NEEDED
Status: DISCONTINUED | OUTPATIENT
Start: 2019-05-12 | End: 2019-05-12 | Stop reason: SDUPTHER

## 2019-05-12 RX ORDER — LIDOCAINE HYDROCHLORIDE 10 MG/ML
0.1 INJECTION, SOLUTION EPIDURAL; INFILTRATION; INTRACAUDAL; PERINEURAL AS NEEDED
Status: DISCONTINUED | OUTPATIENT
Start: 2019-05-12 | End: 2019-05-12

## 2019-05-12 RX ORDER — LOPERAMIDE HYDROCHLORIDE 2 MG/1
2 CAPSULE ORAL AS NEEDED
Status: DISCONTINUED | OUTPATIENT
Start: 2019-05-12 | End: 2019-05-17 | Stop reason: HOSPADM

## 2019-05-12 RX ORDER — FACIAL-BODY WIPES
10 EACH TOPICAL DAILY PRN
Status: DISCONTINUED | OUTPATIENT
Start: 2019-05-14 | End: 2019-05-17 | Stop reason: HOSPADM

## 2019-05-12 RX ORDER — MORPHINE SULFATE 10 MG/ML
2 INJECTION, SOLUTION INTRAMUSCULAR; INTRAVENOUS
Status: DISCONTINUED | OUTPATIENT
Start: 2019-05-12 | End: 2019-05-12

## 2019-05-12 RX ORDER — OXYCODONE HYDROCHLORIDE 5 MG/1
5 TABLET ORAL
Status: DISCONTINUED | OUTPATIENT
Start: 2019-05-12 | End: 2019-05-17 | Stop reason: HOSPADM

## 2019-05-12 RX ORDER — FINASTERIDE 5 MG/1
5 TABLET, FILM COATED ORAL DAILY
Status: DISCONTINUED | OUTPATIENT
Start: 2019-05-13 | End: 2019-05-12 | Stop reason: SDUPTHER

## 2019-05-12 RX ORDER — AMOXICILLIN 250 MG
1 CAPSULE ORAL 2 TIMES DAILY
Status: DISCONTINUED | OUTPATIENT
Start: 2019-05-12 | End: 2019-05-17 | Stop reason: HOSPADM

## 2019-05-12 RX ORDER — ACETAMINOPHEN 10 MG/ML
INJECTION, SOLUTION INTRAVENOUS AS NEEDED
Status: DISCONTINUED | OUTPATIENT
Start: 2019-05-12 | End: 2019-05-12 | Stop reason: HOSPADM

## 2019-05-12 RX ORDER — FENTANYL CITRATE 50 UG/ML
25 INJECTION, SOLUTION INTRAMUSCULAR; INTRAVENOUS
Status: DISCONTINUED | OUTPATIENT
Start: 2019-05-12 | End: 2019-05-12

## 2019-05-12 RX ORDER — HYDROMORPHONE HYDROCHLORIDE 1 MG/ML
0.5 INJECTION, SOLUTION INTRAMUSCULAR; INTRAVENOUS; SUBCUTANEOUS
Status: ACTIVE | OUTPATIENT
Start: 2019-05-12 | End: 2019-05-13

## 2019-05-12 RX ORDER — CELECOXIB 200 MG/1
200 CAPSULE ORAL 2 TIMES DAILY
Status: DISCONTINUED | OUTPATIENT
Start: 2019-05-13 | End: 2019-05-12

## 2019-05-12 RX ORDER — GLYCOPYRROLATE 0.2 MG/ML
INJECTION INTRAMUSCULAR; INTRAVENOUS AS NEEDED
Status: DISCONTINUED | OUTPATIENT
Start: 2019-05-12 | End: 2019-05-12 | Stop reason: HOSPADM

## 2019-05-12 RX ORDER — ADHESIVE BANDAGE
30 BANDAGE TOPICAL DAILY PRN
Status: DISCONTINUED | OUTPATIENT
Start: 2019-05-12 | End: 2019-05-12 | Stop reason: SDUPTHER

## 2019-05-12 RX ORDER — ACETAMINOPHEN 650 MG/1
650 SUPPOSITORY RECTAL
Status: DISCONTINUED | OUTPATIENT
Start: 2019-05-12 | End: 2019-05-17 | Stop reason: HOSPADM

## 2019-05-12 RX ORDER — DEXTROMETHORPHAN POLISTIREX 30 MG/5 ML
SUSPENSION, EXTENDED RELEASE 12 HR ORAL AS NEEDED
Status: DISCONTINUED | OUTPATIENT
Start: 2019-05-12 | End: 2019-05-17 | Stop reason: HOSPADM

## 2019-05-12 RX ORDER — ONDANSETRON 4 MG/1
4 TABLET, ORALLY DISINTEGRATING ORAL
Status: DISCONTINUED | OUTPATIENT
Start: 2019-05-12 | End: 2019-05-12 | Stop reason: SDUPTHER

## 2019-05-12 RX ORDER — CEFAZOLIN SODIUM/WATER 2 G/20 ML
2 SYRINGE (ML) INTRAVENOUS ONCE
Status: COMPLETED | OUTPATIENT
Start: 2019-05-12 | End: 2019-05-12

## 2019-05-12 RX ORDER — CEFAZOLIN SODIUM/WATER 2 G/20 ML
2 SYRINGE (ML) INTRAVENOUS EVERY 8 HOURS
Status: COMPLETED | OUTPATIENT
Start: 2019-05-12 | End: 2019-05-13

## 2019-05-12 RX ADMIN — LIDOCAINE HYDROCHLORIDE 60 MG: 20 INJECTION, SOLUTION EPIDURAL; INFILTRATION; INTRACAUDAL; PERINEURAL at 11:33

## 2019-05-12 RX ADMIN — HEPARIN SODIUM 5000 UNITS: 5000 INJECTION INTRAVENOUS; SUBCUTANEOUS at 18:14

## 2019-05-12 RX ADMIN — FENTANYL CITRATE 50 MCG: 50 INJECTION, SOLUTION INTRAMUSCULAR; INTRAVENOUS at 12:08

## 2019-05-12 RX ADMIN — Medication 2 G: at 19:26

## 2019-05-12 RX ADMIN — Medication 2 G: at 11:50

## 2019-05-12 RX ADMIN — NEOSTIGMINE METHYLSULFATE 3.5 MG: 1 INJECTION INTRAVENOUS at 12:30

## 2019-05-12 RX ADMIN — ACETAMINOPHEN 1000 MG: 10 INJECTION, SOLUTION INTRAVENOUS at 12:07

## 2019-05-12 RX ADMIN — FENTANYL CITRATE 50 MCG: 50 INJECTION, SOLUTION INTRAMUSCULAR; INTRAVENOUS at 11:33

## 2019-05-12 RX ADMIN — SODIUM CHLORIDE, POTASSIUM CHLORIDE, SODIUM LACTATE AND CALCIUM CHLORIDE: 600; 310; 30; 20 INJECTION, SOLUTION INTRAVENOUS at 11:10

## 2019-05-12 RX ADMIN — GLYCOPYRROLATE 0.5 MG: 0.2 INJECTION INTRAMUSCULAR; INTRAVENOUS at 12:30

## 2019-05-12 RX ADMIN — MORPHINE SULFATE 1 MG: 2 INJECTION, SOLUTION INTRAMUSCULAR; INTRAVENOUS at 12:31

## 2019-05-12 RX ADMIN — ETOMIDATE 8 MG: 2 INJECTION INTRAVENOUS at 11:33

## 2019-05-12 RX ADMIN — Medication 80 MCG: at 12:37

## 2019-05-12 RX ADMIN — ROCURONIUM BROMIDE 5 MG: 10 INJECTION, SOLUTION INTRAVENOUS at 11:33

## 2019-05-12 RX ADMIN — ROCURONIUM BROMIDE 25 MG: 10 INJECTION, SOLUTION INTRAVENOUS at 11:40

## 2019-05-12 RX ADMIN — SODIUM CHLORIDE 125 ML/HR: 900 INJECTION, SOLUTION INTRAVENOUS at 15:05

## 2019-05-12 RX ADMIN — Medication 10 ML: at 15:05

## 2019-05-12 RX ADMIN — MORPHINE SULFATE 1 MG: 2 INJECTION, SOLUTION INTRAMUSCULAR; INTRAVENOUS at 12:48

## 2019-05-12 RX ADMIN — SODIUM CHLORIDE 125 ML/HR: 900 INJECTION, SOLUTION INTRAVENOUS at 22:55

## 2019-05-12 NOTE — PROGRESS NOTES
OK to proceed with scheduled surgical procedure this am pending cardiology clearance, assuming no acute changes in clinical status or lab derangements overnight and patient remains NPO.

## 2019-05-12 NOTE — PERIOP NOTES
TRANSFER - IN REPORT:    Verbal report received from [facility] on [facility]  being received from [address] for ordered procedure      Report consisted of patients Situation, Background, Assessment and   Recommendations(SBAR). Information from the following report(s) SBAR, Kardex and MAR was reviewed with the receiving nurse. Opportunity for questions and clarification was provided. Assessment completed upon patients arrival to unit and care assumed.

## 2019-05-12 NOTE — PROGRESS NOTES
Bedside and Verbal shift change report given to Chiqui (oncoming nurse) by Carol (offgoing nurse). Report included the following information SBAR, Kardex, Procedure Summary, Intake/Output, MAR, Recent Results and Cardiac Rhythm Paced.

## 2019-05-12 NOTE — PERIOP NOTES
TRANSFER - OUT REPORT:    Verbal report given to Sara) on Elo White  being transferred to [facility](unit) for routine post - op       Report consisted of patients Situation, Background, Assessment and   Recommendations(SBAR). Information from the following report(s) SBAR, Kardex, ED Summary, OR Summary, Procedure Summary, Intake/Output, MAR, Accordion, Recent Results, Med Rec Status, Cardiac Rhythm Paced, Alarm Parameters , Pre Procedure Checklist, Procedure Verification and Quality Measures was reviewed with the receiving nurse. Opportunity for questions and clarification was provided. Patient transported with:   Monitor  O2 @ 3 liters  Registered Nurse     [phone] Unable to contact daughter to update. Not answering phone and not in waiting room. [id number] Daughter was in hallway when transporting to room. Allowed time for questions and answers.

## 2019-05-12 NOTE — PERIOP NOTES
Handoff Report from Operating Room to PACU    Report received from Kin Gasca RN and ANKIT Garcia CRNA regarding Jair Manzanares. Surgeon(s):  Clark Olivas MD  And Procedure(s) (LRB):  LEFT HIP HEMIARTHROPLASTY (Left)  confirmed   with allergies and dressings discussed. Anesthesia type, drugs, patient history, complications, estimated blood loss, vital signs, intake and output, and last pain medication, lines, reversal medications and temperature were reviewed.

## 2019-05-12 NOTE — ANESTHESIA POSTPROCEDURE EVALUATION
Procedure(s): LEFT HIP HEMIARTHROPLASTY. general 
 
Anesthesia Post Evaluation Patient location during evaluation: PACU Note status: Adequate. Level of consciousness: responsive to verbal stimuli and sleepy but conscious Pain management: satisfactory to patient Airway patency: patent Anesthetic complications: no 
Cardiovascular status: acceptable Respiratory status: acceptable Hydration status: acceptable Comments: +Post-Anesthesia Evaluation and Assessment Patient: Fredo Cardenas MRN: [id number]  SSN: [id number] YOB: 1923  Age: 80 y.o. Sex: male Cardiovascular Function/Vital Signs /50 (BP 1 Location: Right arm, BP Patient Position: At rest)   Pulse 60   Temp 36.1 °C (97 °F)   Resp 23   SpO2 94% Patient is status post Procedure(s): LEFT HIP HEMIARTHROPLASTY. Nausea/Vomiting: Controlled. Postoperative hydration reviewed and adequate. Pain: 
Pain Scale 1: FLACC (05/12/19 1345) Pain Intensity 1: 0 (05/12/19 1345) Managed. Neurological Status:  
Neuro (WDL): Exceptions to WDL (05/12/19 1345) At baseline. Mental Status and Level of Consciousness: Arousable. Pulmonary Status:  
O2 Device: Nasal cannula (05/12/19 1345) Adequate oxygenation and airway patent. Complications related to anesthesia: None Post-anesthesia assessment completed. No concerns. Signed By: Fernando Price MD  
 5/12/2019 Post anesthesia nausea and vomiting:  controlled Vitals Value Taken Time /50 5/12/2019  1:45 PM  
Temp 36.1 °C (97 °F) 5/12/2019  1:45 PM  
Pulse 60 5/12/2019  1:54 PM  
Resp 18 5/12/2019  1:54 PM  
SpO2 95 % 5/12/2019  1:54 PM  
Vitals shown include unvalidated device data.

## 2019-05-12 NOTE — PROGRESS NOTES
Hospitalist Progress Note    NAME: Kal Smith   :  1923   MRN:  [id number]       Assessment / Plan:  Closed Fracture of left Femur: s/p left hip hemiarthroplasty, under Ortho care. GLF   H/o Afib on Long term Anticoagulation: resume Eliquis whenever appropriate as per Ortho. H/o NSTEMI  CAD S/p CABGx4  Resume home regimen  Hyperlipidemia: c/w statin  Hypothyroidism: c/w L-thyroxine  Dementia: seems stable, monitor  Surrogate Decision Maker: frederick Bui   Baseline: ambulatory with cane and walker  Lives in assisted living facility  Code status: Full  Prophylaxis: Eliquis whenever OK by Ortho  Recommended Disposition: SNF/LTC     Subjective:     Chief Complaint / Reason for Physician Visit  Sleepy after left hip hemiarthroplasty. Discussed with RN events overnight. Review of Systems:  Symptom Y/N Comments  Symptom Y/N Comments   Fever/Chills    Chest Pain     Poor Appetite    Edema     Cough    Abdominal Pain     Sputum    Joint Pain     SOB/YOUNG    Pruritis/Rash     Nausea/vomit    Tolerating PT/OT     Diarrhea    Tolerating Diet     Constipation    Other       Could NOT obtain due to: dementia     Objective:     VITALS:   Last 24hrs VS reviewed since prior progress note. Most recent are:  Patient Vitals for the past 24 hrs:   Temp Pulse Resp BP SpO2   19 1034 99.3 °F (37.4 °C) 63 20 151/64 93 %   19 0747 98.7 °F (37.1 °C) 62 18 146/61 92 %   19 0426 97.6 °F (36.4 °C) 65 18 142/56 93 %   19 0045 97.6 °F (36.4 °C) 63 20 140/60 91 %   19 2038 97.6 °F (36.4 °C) 63 20 122/47 93 %   19 1421 98.5 °F (36.9 °C) 65 18 124/46 92 %       Intake/Output Summary (Last 24 hours) at 2019 1256  Last data filed at 2019 1238  Gross per 24 hour   Intake 700 ml   Output 900 ml   Net -200 ml        PHYSICAL EXAM:  General: WD, WN. lethargic, no acute distress    EENT:  EOMI. Anicteric sclerae.  MMM  Resp:  Coarse BS  CV:  Irregular  rhythm,  No edema  GI:  Soft, Non distended, Non tender.  +Bowel sounds  Neurologic:  Alert and oriented X 0, no  Speech, (lethargic after surgery)   Psych:   Poor  insight. Not anxious nor agitated  Skin:  No rashes. No jaundice    Reviewed most current lab test results and cultures  YES  Reviewed most current radiology test results   YES  Review and summation of old records today    NO  Reviewed patient's current orders and MAR    YES  PMH/SH reviewed - no change compared to H&P  ________________________________________________________________________  Care Plan discussed with:    Comments   Patient y    Family      RN y    Care Manager     Consultant                        Multidiciplinary team rounds were held today with , nursing, pharmacist and clinical coordinator. Patient's plan of care was discussed; medications were reviewed and discharge planning was addressed. ________________________________________________________________________  Total NON critical care TIME:  35   Minutes    Total CRITICAL CARE TIME Spent:   Minutes non procedure based      Comments   >50% of visit spent in counseling and coordination of care y    ________________________________________________________________________  Mona Wisdom MD     Procedures: see electronic medical records for all procedures/Xrays and details which were not copied into this note but were reviewed prior to creation of Plan. LABS:  I reviewed today's most current labs and imaging studies.   Pertinent labs include:  Recent Labs     05/12/19  0605 05/11/19  0608 05/10/19  0625   WBC 8.9 9.5 11.4*   HGB 10.6* 10.8* 11.3*   HCT 32.4* 34.0* 34.3*   PLT 95* 96* 122*     Recent Labs     05/12/19  0605 05/11/19  0608 05/10/19  0625 05/09/19  2100    143 144 144   K 3.9 4.1 3.8 3.8   * 108 107 107   CO2 23 27 23 25   * 96 111* 110*   BUN 29* 26* 26* 23*   CREA 1.17 1.34* 1.46* 1.33*   CA 8.0* 8.6 8.7 8.8   ALB 2.9*  --   --  3.5 TBILI 1.1*  --   --  0.3   SGOT 25  --   --  29   ALT 15  --   --  25   INR  --   --   --  1.3*       Signed: Liset Andrea MD

## 2019-05-12 NOTE — BRIEF OP NOTE
BRIEF OPERATIVE NOTE    Date of Procedure: 5/12/2019   Preoperative Diagnosis: left hip fracture  Postoperative Diagnosis: LEFT HIP FRACTURE    Procedure(s):  LEFT HIP HEMIARTHROPLASTY  Surgeon(s) and Role:     Suzi Hayward MD - Primary         Surgical Assistant: Sudhir Freeman    Surgical Staff:  Ida Soheila: Corby Aleman RN  Physician Assistant: PRINCESS Plasencia  Scrub RN-1: Gautam Guerra RN  Event Time In Time Out   Incision Start 05/12/2019 1159    Incision Close       Anesthesia: General   Estimated Blood Loss: 100cc  Specimens:   ID Type Source Tests Collected by Time Destination   1 : left femoral head - discarded Bone Femur  Ying Arevalo MD 5/12/2019 1239 Discarded      Findings: femoral neck fx   Complications: none  Implants: * No implants in log *

## 2019-05-12 NOTE — PROGRESS NOTES
Pt has clearly been seen by [facility]; please consult them. In fact, please find and read their note dated 5/10/19.

## 2019-05-12 NOTE — CONSULTS
[facility]    Name:  Denis Moser  MR#:  [id number]  :  1923  ACCOUNT #:  [de-identified]  DATE OF SERVICE:  2019      CHIEF COMPLAINT: Left hip pain. HISTORY OF PRESENT ILLNESS:  The patient is a 80year-old gentleman who sustained a ground level fall and was admitted with a femoral neck fracture to [facility] in [place]. He was initially evaluated by Dr. Ara Hutchison and it was determined that he was a good candidate for a left hip hemiarthroplasty. He is rating his pain as 10/10, worse with any motion of the leg. It is deep, throbbing, and aching in nature and it is there constant. It has been present now for couple days since the fall and he is unable to ambulate. PAST MEDICAL HISTORY:  Significant for GI bleed, iron deficiency anemia, stage III kidney disease, prior history of myocardial infarction and atrial fibrillation. He has a pacemaker. He also has some mild dementia. PAST SURGICAL HISTORY:  He has had cardiac bypass. He has had a colonoscopy and a pacemaker. MEDICATIONS:  Medications have been reviewed. ALLERGIES:  HIS ALLERGIES ARE TO HYDROCODONE. FAMILY HISTORY:  Heart disease and glaucoma. REVIEW OF SYSTEMS:  He denies any bowel or bladder incontinence, any fevers, or chills, or any saddle anesthesia. PHYSICAL EXAMINATION:  GENERAL:  He is a well-developed, well-nourished male, appropriate for his area. VITAL SIGNS:  Blood pressure is 124/60, pulse is 62, temperature is 98.9, respirations are 16, O2 sats are 93% on room air. CARDIOVASCULAR:  No swelling or edema. No cyanosis. LYMPHATICS:  No lymphadenopathy. SKIN:  No lesions or rashes. NEUROLOGIC:  He has 1+ reflex. MUSCULOSKELETAL:  He is unable to ambulate. His left leg is short and externally rotated. He has pain with logroll. He has full range of motion of his toes distally.   He has full range of motion of bilateral upper extremities. IMAGING STUDIES:  Show femoral neck fracture on his left hip. ASSESSMENT/PLAN:  He has femoral neck fracture of the left hip that would benefit from a hemiarthroplasty. At this point, we will ensure why this patient was transferred here for further care from [facility]. There is a note from Dr. Sanjeev Roman stating multiple issues that he was concerned about with anesthesia as well as cardiology, however, dose appear to be Dr. Cisco Johnson only compliant as the anesthesia note does not state that they are concerned about any possible complications and the cariology note states that he is on low cardiac risk for surgery and had been cleared in [facility]. Again, unclear why this patient has been transferred. This appear to have been inappropriately performed given that the surgery could have been performed at the [facility].  In spite of that at this point, the patient is present here. We have discussed the case with the daughter and she would like to proceed with surgical intervention. We had given him warnings about possible complications including, but not limited to pain, scar, bleeding, infection, nonunion, damage to surrounding structures, death, paralysis, blindness, or stroke. He understands and wants to proceed. We will schedule that for the next available. No further cardiac clearance is needed at [facility] as that has been fully completed and low cardiac risk has been deemed from the evaluation performed at [facility].       MD INOCENTE [id number]  D:  05/12/2019 11:28  T:  05/12/2019 13:50  JOB #:  [id number]

## 2019-05-12 NOTE — PROGRESS NOTES
Orthopedic End of Shift Note    Bedside and Verbal shift change report given to Michelle Gramajo (oncoming nurse) by Isaak Carrero (offgoing nurse). Report included the following information SBAR, Kardex, Intake/Output, MAR and Recent Results.      POD#     Significant issues during shift: none    Issues for Physician to address: none    Activity This Shift  (check all that apply) [] chair  [] dangle   [] bathroom  [] bedside commode [] hallway  [x] bedrest   Nausea/Vomiting [] yes [x] no     Voiding Status [] void [x] Russell [] I&O Cath   Bowel Movements [] yes [x] no     Foot Pumps or SCD [x] yes [] no    Ice Pack [x] yes    [] no    Incentive Spirometer [] yes [] no Volume:   Telemetry Monitoring   [x] yes [] no Rhythm:PACED   Supplemental O2 [x] yes [] no Sat off O2:

## 2019-05-12 NOTE — ANESTHESIA PREPROCEDURE EVALUATION
Relevant Problems No relevant active problems Anesthetic History No history of anesthetic complications Review of Systems / Medical History Patient summary reviewed, nursing notes reviewed and pertinent labs reviewed Pulmonary Smoker Comments: Former smoker Neuro/Psych Psychiatric history Comments: Depression/anxiety Cardiovascular Hypertension: well controlled Dysrhythmias : atrial fibrillation Pacemaker, CAD and hyperlipidemia Exercise tolerance: <4 METS Comments: Seen by cardiology: known h/o CAD, s/p CABG X4 in 1995. Subsequent NSTEMI in 2016, s/p Stents. Echo in 11, 2018: EF 70%, no wall motion abnormality with mild TR, Good exercise capacity:  golfing in 11, 2018. H/o A fib, s/p pacer 2009. On eliquis, which was stopped. Evaluated by Cardiology Rebeca Vang) 5/09/19 GI/Hepatic/Renal 
  
 
 
Renal disease: CRI Endo/Other Within defined limits Hypothyroidism: well controlled Other Findings Physical Exam 
 
Airway Mallampati: III 
TM Distance: 4 - 6 cm Neck ROM: decreased range of motion Mouth opening: Diminished (comment) Cardiovascular Rhythm: regular Rate: normal 
 
 
Pertinent negatives: No murmur Dental 
 
Dentition: Edentulous Pulmonary Decreased breath sounds: bibasilar Abdominal 
GI exam deferred Other Findings Anesthetic Plan ASA: 4 Anesthesia type: general 
 
Monitoring Plan: BIS Induction: Intravenous Anesthetic plan and risks discussed with: Patient

## 2019-05-13 LAB
ALBUMIN SERPL-MCNC: 2.5 G/DL (ref 3.5–5)
ALBUMIN/GLOB SERPL: 0.6 {RATIO} (ref 1.1–2.2)
ALP SERPL-CCNC: 88 U/L (ref 45–117)
ALT SERPL-CCNC: 12 U/L (ref 12–78)
ANION GAP SERPL CALC-SCNC: 9 MMOL/L (ref 5–15)
AST SERPL-CCNC: 32 U/L (ref 15–37)
BASOPHILS # BLD: 0 K/UL (ref 0–0.1)
BASOPHILS NFR BLD: 0 % (ref 0–1)
BILIRUB SERPL-MCNC: 0.7 MG/DL (ref 0.2–1)
BUN SERPL-MCNC: 31 MG/DL (ref 6–20)
BUN/CREAT SERPL: 24 (ref 12–20)
CALCIUM SERPL-MCNC: 7.7 MG/DL (ref 8.5–10.1)
CHLORIDE SERPL-SCNC: 113 MMOL/L (ref 97–108)
CO2 SERPL-SCNC: 22 MMOL/L (ref 21–32)
CREAT SERPL-MCNC: 1.31 MG/DL (ref 0.7–1.3)
DIFFERENTIAL METHOD BLD: ABNORMAL
EOSINOPHIL # BLD: 0.4 K/UL (ref 0–0.4)
EOSINOPHIL NFR BLD: 5 % (ref 0–7)
ERYTHROCYTE [DISTWIDTH] IN BLOOD BY AUTOMATED COUNT: 14.2 % (ref 11.5–14.5)
GLOBULIN SER CALC-MCNC: 3.9 G/DL (ref 2–4)
GLUCOSE SERPL-MCNC: 96 MG/DL (ref 65–100)
HCT VFR BLD AUTO: 31.5 % (ref 36.6–50.3)
HGB BLD-MCNC: 10.1 G/DL (ref 12.1–17)
IMM GRANULOCYTES # BLD AUTO: 0 K/UL (ref 0–0.04)
IMM GRANULOCYTES NFR BLD AUTO: 0 % (ref 0–0.5)
LYMPHOCYTES # BLD: 0.9 K/UL (ref 0.8–3.5)
LYMPHOCYTES NFR BLD: 12 % (ref 12–49)
MAGNESIUM SERPL-MCNC: 2.1 MG/DL (ref 1.6–2.4)
MCH RBC QN AUTO: 30.5 PG (ref 26–34)
MCHC RBC AUTO-ENTMCNC: 32.1 G/DL (ref 30–36.5)
MCV RBC AUTO: 95.2 FL (ref 80–99)
MONOCYTES # BLD: 0.9 K/UL (ref 0–1)
MONOCYTES NFR BLD: 12 % (ref 5–13)
NEUTS SEG # BLD: 5.5 K/UL (ref 1.8–8)
NEUTS SEG NFR BLD: 71 % (ref 32–75)
NRBC # BLD: 0 K/UL (ref 0–0.01)
NRBC BLD-RTO: 0 PER 100 WBC
PLATELET # BLD AUTO: 108 K/UL (ref 150–400)
PMV BLD AUTO: 11.1 FL (ref 8.9–12.9)
POTASSIUM SERPL-SCNC: 4.1 MMOL/L (ref 3.5–5.1)
PROT SERPL-MCNC: 6.4 G/DL (ref 6.4–8.2)
RBC # BLD AUTO: 3.31 M/UL (ref 4.1–5.7)
SODIUM SERPL-SCNC: 144 MMOL/L (ref 136–145)
WBC # BLD AUTO: 7.7 K/UL (ref 4.1–11.1)

## 2019-05-13 PROCEDURE — 74011250637 HC RX REV CODE- 250/637: Performed by: ORTHOPAEDIC SURGERY

## 2019-05-13 PROCEDURE — 65660000000 HC RM CCU STEPDOWN

## 2019-05-13 PROCEDURE — 83735 ASSAY OF MAGNESIUM: CPT

## 2019-05-13 PROCEDURE — 97530 THERAPEUTIC ACTIVITIES: CPT | Performed by: PHYSICAL THERAPIST

## 2019-05-13 PROCEDURE — 97165 OT EVAL LOW COMPLEX 30 MIN: CPT

## 2019-05-13 PROCEDURE — 97161 PT EVAL LOW COMPLEX 20 MIN: CPT | Performed by: PHYSICAL THERAPIST

## 2019-05-13 PROCEDURE — 80053 COMPREHEN METABOLIC PANEL: CPT

## 2019-05-13 PROCEDURE — 77010033678 HC OXYGEN DAILY

## 2019-05-13 PROCEDURE — 74011250636 HC RX REV CODE- 250/636: Performed by: ORTHOPAEDIC SURGERY

## 2019-05-13 PROCEDURE — 36415 COLL VENOUS BLD VENIPUNCTURE: CPT

## 2019-05-13 PROCEDURE — 85025 COMPLETE CBC W/AUTO DIFF WBC: CPT

## 2019-05-13 PROCEDURE — 74011250636 HC RX REV CODE- 250/636: Performed by: INTERNAL MEDICINE

## 2019-05-13 PROCEDURE — 77030033269 HC SLV COMPR SCD KNE2 CARD -B

## 2019-05-13 PROCEDURE — 97535 SELF CARE MNGMENT TRAINING: CPT

## 2019-05-13 PROCEDURE — 94760 N-INVAS EAR/PLS OXIMETRY 1: CPT

## 2019-05-13 PROCEDURE — 74011250636 HC RX REV CODE- 250/636

## 2019-05-13 RX ORDER — HEPARIN SODIUM 5000 [USP'U]/ML
INJECTION, SOLUTION INTRAVENOUS; SUBCUTANEOUS
Status: COMPLETED
Start: 2019-05-13 | End: 2019-05-13

## 2019-05-13 RX ORDER — CYANOCOBALAMIN 1000 UG/ML
1000 INJECTION, SOLUTION INTRAMUSCULAR; SUBCUTANEOUS
Status: DISCONTINUED | OUTPATIENT
Start: 2019-05-24 | End: 2019-05-17 | Stop reason: HOSPADM

## 2019-05-13 RX ORDER — SODIUM CHLORIDE 9 MG/ML
50 INJECTION, SOLUTION INTRAVENOUS CONTINUOUS
Status: DISPENSED | OUTPATIENT
Start: 2019-05-13 | End: 2019-05-14

## 2019-05-13 RX ADMIN — ACETAMINOPHEN 1000 MG: 500 TABLET ORAL at 12:13

## 2019-05-13 RX ADMIN — ACETAMINOPHEN 1000 MG: 500 TABLET ORAL at 17:20

## 2019-05-13 RX ADMIN — ESCITALOPRAM OXALATE 5 MG: 10 TABLET ORAL at 08:47

## 2019-05-13 RX ADMIN — POLYETHYLENE GLYCOL 3350 17 G: 17 POWDER, FOR SOLUTION ORAL at 08:46

## 2019-05-13 RX ADMIN — ACETAMINOPHEN 1000 MG: 500 TABLET ORAL at 00:51

## 2019-05-13 RX ADMIN — FERROUS SULFATE TAB 325 MG (65 MG ELEMENTAL FE) 325 MG: 325 (65 FE) TAB at 08:46

## 2019-05-13 RX ADMIN — ACETAMINOPHEN 1000 MG: 500 TABLET ORAL at 05:49

## 2019-05-13 RX ADMIN — DOCUSATE SODIUM 100 MG: 100 CAPSULE, LIQUID FILLED ORAL at 17:20

## 2019-05-13 RX ADMIN — LORATADINE 10 MG: 10 TABLET ORAL at 08:47

## 2019-05-13 RX ADMIN — ENOXAPARIN SODIUM 40 MG: 40 INJECTION SUBCUTANEOUS at 08:46

## 2019-05-13 RX ADMIN — Medication 10 ML: at 13:51

## 2019-05-13 RX ADMIN — SODIUM CHLORIDE 125 ML/HR: 900 INJECTION, SOLUTION INTRAVENOUS at 07:37

## 2019-05-13 RX ADMIN — SODIUM CHLORIDE 50 ML/HR: 900 INJECTION, SOLUTION INTRAVENOUS at 15:18

## 2019-05-13 RX ADMIN — CALCIUM CARBONATE-VITAMIN D TAB 500 MG-200 UNIT 1 TABLET: 500-200 TAB at 08:46

## 2019-05-13 RX ADMIN — PANTOPRAZOLE SODIUM 40 MG: 40 TABLET, DELAYED RELEASE ORAL at 05:49

## 2019-05-13 RX ADMIN — CALCIUM CARBONATE-VITAMIN D TAB 500 MG-200 UNIT 1 TABLET: 500-200 TAB at 12:13

## 2019-05-13 RX ADMIN — FINASTERIDE 5 MG: 5 TABLET, FILM COATED ORAL at 08:46

## 2019-05-13 RX ADMIN — HEPARIN SODIUM 5000 UNITS: 5000 INJECTION, SOLUTION INTRAVENOUS; SUBCUTANEOUS at 00:10

## 2019-05-13 RX ADMIN — HEPARIN SODIUM 5000 UNITS: 5000 INJECTION INTRAVENOUS; SUBCUTANEOUS at 00:10

## 2019-05-13 RX ADMIN — Medication 1 CAPSULE: at 08:47

## 2019-05-13 RX ADMIN — MULTIPLE VITAMINS W/ MINERALS TAB 1 TABLET: TAB at 08:46

## 2019-05-13 RX ADMIN — SENNOSIDES AND DOCUSATE SODIUM 1 TABLET: 8.6; 5 TABLET ORAL at 17:20

## 2019-05-13 RX ADMIN — Medication 2 G: at 03:57

## 2019-05-13 RX ADMIN — ATORVASTATIN CALCIUM 80 MG: 40 TABLET, FILM COATED ORAL at 08:47

## 2019-05-13 RX ADMIN — SENNOSIDES AND DOCUSATE SODIUM 1 TABLET: 8.6; 5 TABLET ORAL at 08:47

## 2019-05-13 RX ADMIN — LEVOTHYROXINE SODIUM 75 MCG: 75 TABLET ORAL at 05:49

## 2019-05-13 RX ADMIN — Medication 10 ML: at 05:49

## 2019-05-13 NOTE — OP NOTES
[address]  OPERATIVE REPORT    Name:  Jessica Enrique  MR#:  [id number]  :  1923  ACCOUNT #:  [de-identified]  DATE OF SERVICE:  2019      PREOPERATIVE DIAGNOSIS:  Left femoral neck fracture. POSTOPERATIVE DIAGNOSIS:  Left femoral neck fracture. PROCEDURE PERFORMED:  Left hip hemiarthroplasty. SURGEON:  Kannan Ortiz MD    ASSISTANT:  PRINCESS Tyler    ANESTHESIA:  General.    COMPLICATIONS:  None. SPECIMENS REMOVED:  None. DRAINS:  None. IMPLANTS:  Biomet hemiarthroplasty. ESTIMATED BLOOD LOSS:  100 mL    INDICATIONS FOR THE PROCEDURE:  The patient is a pleasant 80year-old gentleman with a femoral neck fracture due to a fall. He had failed to improve with nonoperative treatment, and at this point, would like to proceed with surgical intervention. I have given him warnings about possible complications including but not limited to pain, scar, bleeding, infection, nonunion, damage to surrounding structures, death, paralysis, blindness, or stroke. He understands and wants to proceed. PROCEDURE:  After informed consent was obtained and the operative site was properly marked, the patient was moved back to the operating room and underwent general endotracheal anesthesia. He was positioned in lateral decubitus using a bean bag and he was safely secured to the bean bag. We then proceeded to prep and drape in the usual manner, followed by obtaining a timeout, verifying that this is the correct patient, the correct surgery, the correct site, as well as that he had received IV antibiotics, in this case 2 g of IV Ancef within an hour prior to the procedure. I then proceeded to prep and drape in the usual manner, followed by obtaining a timeout, verifying that is the correct patient, the correct surgery, the correct site, as well as that he had received IV antibiotics within 30 minutes of the incision.   I then proceeded to perform my standard anterolateral approach to the left hip, then proceeded to expose the fracture site, and we used the cork screw to remove the femoral head. The femoral head was then measured and the trial was used to determine that that was the appropriate size. I then proceeded to use the guide to cut the femoral neck one fingerbreadth above the lesser trochanter. Once that cut was made, I used the curettes to find the canal.  I then used a rat's tail to lateralize the entry point and then proceeded to use broaches. Once we were able to finish with the broaches, I was able to confirm that we had the appropriate size implant selected. I then proceeded to trial it, and with the standard neck, verified that were in the good place. I then proceeded to remove the trial, placed the permanent implant, reduced the hip, and then closed the capsule with #1 Vicryl, followed by reattaching the abductors with #1 FiberWire and then proceeded to close the fascia with #1 Vicryl and the subcu with 2-0 Vicryl and staples. Sterile dressing was applied. The patient was then awakened and transferred to PACU in stable condition. POSTOPERATIVE PLAN:  The patient is going to remain here overnight. We are going to give him SCDs and HARSHA tobin for DVT prophylaxis and Ancef for infection prophylaxis.       MD INOCENTE Clifford/[email or web address]_T/BS_EDIT  D:  05/12/2019 12:50  T:  05/12/2019 22:39  JOB #:  [id number]  CC:  [facility]

## 2019-05-13 NOTE — PROGRESS NOTES
Problem: Self Care Deficits Care Plan (Adult)  Goal: *Acute Goals and Plan of Care (Insert Text)  Description  Occupational Therapy Goals  Initiated 5/13/2019     1. Patient will perform lower body dressing using Reacher  Stocking Aid  Elastic Shoe Laces  Long Handled Shoe Horn at moderate assistance  level within 7 days. 2. Patient will perform toilet transfers at moderate assistance  level using Walkers, Type: Rolling Walker  within 7 days. 3. Patient will perform all aspects of toileting at minimal assistance/contact guard assist level within 7 days. 4. Patient will demonstrate 3/3 hip precautions without cues within 7 days. Outcome: Progressing Towards Goal   OCCUPATIONAL THERAPY EVALUATION  Patient: Sameer Burt (43 y.o. male)  Date: 5/13/2019  Primary Diagnosis: Closed left femoral fracture ([facility] [address] [S72.92XA]  Procedure(s) (LRB):  LEFT HIP HEMIARTHROPLASTY (Left) 1 Day Post-Op   Precautions: fall       ASSESSMENT :  Based on the objective data described below, the patient presents with generalized weakness, decreased endurance, strength, cognition, functional mobility, and ADLs. Pt was living at [facility] and stated that he was using cane in his apt and rollator outside apt and was independent with ADLs. Pt was cleared to be seen for therapy and all VSS. Pt was supine in bed and family in room and pt was anxious about moving and getting out of bed. He was mod to max of 2 for bed mobility and was max of 2 for scooting to EOb. He was not able to relax and noted that his SBP dropped approx 10 points with sitting and once pt stood his BP dropped 40 points and pt stated that he was not feeling bad or dizzy but noted that his face was pale. Pt was put back to bed and max for sit to supine and pt continued to hold his arms and legs stiff. Pt was left in bed with HOB elevated and nursing in room.   Recommend that pt have further therapy at discharge at his same facility in health care setting with OT and PT.  Pt  and family are in agreement. .  Patient Vitals for the past 8 hrs:   Temp Pulse Resp BP SpO2   05/13/19 1151 98.1 °F (36.7 °C) 62 18 130/46 98 %   05/13/19 0853 -- 65 -- 128/63 --   05/13/19 0849 -- 70 -- 117/58 --   05/13/19 0847 -- 72 -- (!) 77/44 --   05/13/19 0839 -- 65 -- 118/67 --   05/13/19 0837 -- 70 -- 115/55 --   05/13/19 0825 -- 60 -- 138/64 --   05/13/19 0751 98.9 °F (37.2 °C) 60 15 136/60 98 %         Patient will benefit from skilled intervention to address the above impairments. Patient?s rehabilitation potential is considered to be Good  Factors which may influence rehabilitation potential include:   ? None noted  ? Mental ability/status  ? Medical condition  ? Home/family situation and support systems  ? Safety awareness  ? Pain tolerance/management  ? Other:      PLAN :  Recommendations and Planned Interventions:  ?                  Self Care Training                  ? Therapeutic Activities  ? Functional Mobility Training    ? Cognitive Retraining  ? Therapeutic Exercises           ? Endurance Activities  ? Balance Training                   ? Neuromuscular Re-Education  ? Visual/Perceptual Training     ? Home Safety Training  ? Patient Education                 ? Family Training/Education  ? Other (comment):    Frequency/Duration: Patient will be followed by occupational therapy 4 times a week to address goals. Discharge Recommendations: Basil Tariq  Further Equipment Recommendations for Discharge: tbd      SUBJECTIVE:   Patient stated ? I don't know if I can relax or not. .?         OBJECTIVE DATA SUMMARY:   HISTORY:   Past Medical History:   Diagnosis Date    Anemia     Anxiety     Arrhythmia     Afib    Chronic kidney disease     Constipation     Depression     Hypercholesterolemia     Hypertension     NSTEMI (non-ST elevated myocardial infarction) ([facility] [address] 11/21/2016    [facility]:  JACKELIN to SVG-L-PLB and JACKELIN to SVG-LADstents 11/16    Pacemaker 2009    Followed By Dr Brian Salas    S/P CABG x 4 1995    Thyroid disease     hypothyroidism     Past Surgical History:   Procedure Laterality Date    CARDIAC SURG PROCEDURE UNLIST      pacer    CARDIAC SURG PROCEDURE UNLIST  07/20/1995    quadruple bypass     COLONOSCOPY N/A 11/6/2018    COLONOSCOPY performed by Jenn Montesinos MD at [facility] ENDOSCOPY    HX CORONARY ARTERY BYPASS GRAFT  1995    4V    HX PACEMAKER  04/23/2003       Prior Level of Function/Environment/Context: pt lives in [facility] and was active and used a cane in apt and rollator outside apt and independent with ADLs. Expanded or extensive additional review of patient history:     Home Situation  Home Environment: [address] [place] [address] Road Name: Rafael Early  # Steps to Enter: 0  One/Two Story Residence: One story  Living Alone: No  Support Systems: Assisted living  Patient Expects to be Discharged to[de-identified] Rehabilitation facility  Current DME Used/Available at Home: Grab bars, Boris Solomon, straight, Walker, rollator  Hand dominance: Right    EXAMINATION OF PERFORMANCE DEFICITS:  Cognitive/Behavioral Status:  Neurologic State: Alert  Orientation Level: Oriented to person;Oriented to place  Cognition: Follows commands  Perception: Appears intact  Perseveration: No perseveration noted  Safety/Judgement: Fall prevention    Skin: in good health     Edema: none noted    Hearing:   Auditory  Auditory Impairment: Hard of hearing, bilateral, Hearing aid(s)(hearing aids at home)    Vision/Perceptual:           Appears intact                         Range of Motion:    AROM: Generally decreased, functional  PROM: Generally decreased, functional                      Strength:    Strength: Generally decreased, functional Coordination:  Coordination: Within functional limits  Fine Motor Skills-Upper: Left Intact; Right Intact    Gross Motor Skills-Upper: Left Intact; Right Intact    Tone & Sensation:    Tone: Normal  Sensation: Intact                      Balance:  Sitting: Impaired; Without support  Sitting - Static: Good (unsupported)  Sitting - Dynamic: Fair (occasional)  Standing: Impaired; Without support  Standing - Static: Fair  Standing - Dynamic : Poor    Functional Mobility and Transfers for ADLs:  Bed Mobility:  Rolling: Moderate assistance;Assist x2  Supine to Sit: Moderate assistance;Maximum assistance;Assist x2  Sit to Supine: Moderate assistance;Maximum assistance;Assist x2  Scooting: Maximum assistance;Assist x2    Transfers:  Sit to Stand: Maximum assistance;Assist x2  Stand to Sit: Maximum assistance;Assist x2    ADL Assessment:  Feeding: Setup    Oral Facial Hygiene/Grooming: Setup    Bathing: Maximum assistance;Minimum assistance    Upper Body Dressing: Minimum assistance    Lower Body Dressing: Maximum assistance    Toileting: Total assistance              Cognitive Retraining  Safety/Judgement: Fall prevention      Functional Measure:  Barthel Index:    Bathin  Bladder: 0  Bowels: 5  Groomin  Dressin  Feedin  Mobility: 0  Stairs: 0  Toilet Use: 0  Transfer (Bed to Chair and Back): 0  Total: 15/100        Percentage of impairment   0%   1-19%   20-39%   40-59%   60-79%   80-99%   100%   Barthel Score 0-100 100 99-80 79-60 59-40 20-39 1-19   0     The Barthel ADL Index: Guidelines  1. The index should be used as a record of what a patient does, not as a record of what a patient could do. 2. The main aim is to establish degree of independence from any help, physical or verbal, however minor and for whatever reason. 3. The need for supervision renders the patient not independent. 4. A patient's performance should be established using the best available evidence.  Asking the patient, friends/relatives and nurses are the usual sources, but direct observation and common sense are also important. However direct testing is not needed. 5. Usually the patient's performance over the preceding 24-48 hours is important, but occasionally longer periods will be relevant. 6. Middle categories imply that the patient supplies over 50 per cent of the effort. 7. Use of aids to be independent is allowed. Haseeb Gibbons., Barthel, D.W. (3194). Functional evaluation: the Barthel Index. [facility] (14)2. Vanna Knox kimberley COLBY Macedo, Melony Stuart., Brandy Simpson., [place], [address] (1999). Measuring the change indisability after inpatient rehabilitation; comparison of the responsiveness of the Barthel Index and Functional [place] Measure. Journal of Neurology, Neurosurgery, and Psychiatry, 66(4), 535-946. JOVON Guerin, GRACY Correia, & Marguerite Mejia, MKASHIF. (2004.) Assessment of post-stroke quality of life in cost-effectiveness studies: The usefulness of the Barthel Index and the EuroQoL-5D. Quality of Life Research, 15, 090-78         Occupational Therapy Evaluation Charge Determination   History Examination Decision-Making   MEDIUM Complexity : Expanded review of history including physical, cognitive and psychosocial  history  MEDIUM Complexity : 3-5 performance deficits relating to physical, cognitive , or psychosocial skils that result in activity limitations and / or participation restrictions MEDIUM Complexity : Patient may present with comorbidities that affect occupational performnce.  Miniml to moderate modification of tasks or assistance (eg, physical or verbal ) with assesment(s) is necessary to enable patient to complete evaluation       Based on the above components, the patient evaluation is determined to be of the following complexity level: MEDIUM    Pain:  Pain Scale 1: Numeric (0 - 10)  Pain Intensity 1: 0              Activity Tolerance:   fair  Please refer to the flowsheet for vital signs taken during this treatment. After treatment:   ? Patient left in no apparent distress sitting up in chair  ? Patient left in no apparent distress in bed  ? Call bell left within reach  ? Nursing notified  ? Caregiver present  ? Bed alarm activated    COMMUNICATION/EDUCATION:   The patient?s plan of care was discussed with: Physical Therapist, Registered Nurse and family . ? Home safety education was provided and the patient/caregiver indicated understanding. ? Patient/family have participated as able in goal setting and plan of care. ?    Patient/family agree to work toward stated goals and plan of care. ?    Patient understands intent and goals of therapy, but is neutral about his/her participation. ? Patient is unable to participate in goal setting and plan of care. This patient?s plan of care is appropriate for delegation to [facility].     Thank you for this referral.  Beti Rojas OT  Time Calculation: 44 mins

## 2019-05-13 NOTE — PROGRESS NOTES
Ortho / Neurosurgery NP Note    POD# 1  s/p LEFT HIP HEMIARTHROPLASTY     Pt resting in bed. No complaints. Demented, but alert and oriented to self. VSS Afebrile. Wearing NC - 2 LPM - wean today? Voiding status: azar documented with 500 cc output last shift. Labs  Lab Results   Component Value Date/Time    HGB 10.1 (L) 05/13/2019 06:08 AM      Lab Results   Component Value Date/Time    INR 1.3 (H) 05/09/2019 09:00 PM        There is no height or weight on file to calculate BMI. : A BMI > 30 is classified as obesity and > 40 is classified as morbid obesity. Opsite hip Dressing c.d.i  Cryotherapy in place over incision  Calves soft and supple; No pain with passive stretch  Sensation and motor intact, follows commands. SCDs for mechanical DVT proph while in bed     PLAN:  1) PT BID  2) Currently receiving Lovenox  for DVT Prophylaxis. ON Eliquis at home. I have sent a message to Dr. Noé Hogan inquiring about when he would feel comfortable with patient resuming his home Eliquis. 3) GI Prophylaxis - Protonix  4) Readniess for discharge:     [] Vital Signs stable    [] Hgb stable    [] + Voiding    [] Wound intact, drainage minimal    [] Tolerating PO intake     [] Cleared by PT (OT if applicable)     [] Stair training completed (if applicable)    [] Independent / Contact Guard Assist (household distance)     [] Bed mobility     [] Car transfers     [] ADLs    [] Adequate pain control on oral medication alone     From [facility], lives in assisted living home near there. Pt evidently a  Very active man. Will likely need some level of rehab/ SNF. Will see PT progress. Needs to wean O2 as well.     Richa [place], JEM  DNP, ACNP-BC, ONP-C

## 2019-05-13 NOTE — PROGRESS NOTES
Hospitalist Progress Note    NAME: Edward Denney   :  1923   MRN:  [id number]       Assessment / Plan:  Closed Fracture of left Femur: s/p left hip hemiarthroplasty, under Ortho care. Pain is controlled, due for PT/OT  GLF   H/o Afib on Long term Anticoagulation: resume Eliquis whenever appropriate as per Ortho. H/o NSTEMI so far no chest pain  CAD S/p CABGx4  Resume home regimen  Hyperlipidemia: c/w statin  Hypothyroidism: c/w L-thyroxine  Dementia: seems stable, monitor  Surrogate Decision Maker: frederick Bui   Baseline: ambulatory with cane and walker  Lives in assisted living facility  Code status: Full  Prophylaxis: Eliquis whenever OK by Ortho  Recommended Disposition: SNF/LTC     No new issues, needs to work with PT/OT     Subjective:     Chief Complaint / Reason for Physician Visit  \"I feel OK\". Discussed with RN events overnight. Review of Systems:  Symptom Y/N Comments  Symptom Y/N Comments   Fever/Chills    Chest Pain     Poor Appetite    Edema     Cough    Abdominal Pain     Sputum    Joint Pain     SOB/YOUNG    Pruritis/Rash     Nausea/vomit    Tolerating PT/OT     Diarrhea    Tolerating Diet     Constipation    Other       Could NOT obtain due to: dementia     Objective:     VITALS:   Last 24hrs VS reviewed since prior progress note.  Most recent are:  Patient Vitals for the past 24 hrs:   Temp Pulse Resp BP SpO2   19 0853 -- 65 -- 128/63 --   19 0849 -- 70 -- 117/58 --   19 0847 -- 72 -- (!) 77/44 --   19 0839 -- 65 -- 118/67 --   19 0837 -- 70 -- 115/55 --   19 0825 -- 60 -- 138/64 --   19 0751 98.9 °F (37.2 °C) 60 15 136/60 98 %   19 0431 98.6 °F (37 °C) 60 16 133/50 97 %   19 0100 97.9 °F (36.6 °C) 62 16 120/62 100 %   19 1932 98 °F (36.7 °C) 60 18 119/58 100 %   19 1811 -- 60 18 119/62 99 %   19 1736 -- 60 18 125/64 99 %   19 1536 -- 60 18 121/60 99 %   19 1506 97.2 °F (36.2 °C) 60 18 109/58 96 %   05/12/19 1436 -- 60 18 115/55 96 %   05/12/19 1406 -- 60 18 121/61 96 %   05/12/19 1400 -- 60 17 104/55 --   05/12/19 1345 97 °F (36.1 °C) 60 23 120/50 94 %   05/12/19 1330 -- 60 18 107/49 94 %   05/12/19 1325 -- 60 20 109/50 93 %   05/12/19 1320 -- 60 17 107/49 94 %   05/12/19 1315 -- 60 17 104/45 95 %   05/12/19 1310 -- 60 17 104/45 95 %   05/12/19 1307 97.5 °F (36.4 °C) 61 19 106/46 96 %   05/12/19 1305 97.5 °F (36.4 °C) 62 21 106/46 96 %       Intake/Output Summary (Last 24 hours) at 5/13/2019 1059  Last data filed at 5/13/2019 0700  Gross per 24 hour   Intake 2019.17 ml   Output 850 ml   Net 1169.17 ml        PHYSICAL EXAM:  General: WD, WN. Awake, oriented, no acute distress    EENT:  EOMI. Anicteric sclerae. MMM  Resp:  Coarse BS  CV:  Irregular  rhythm,  No edema  GI:  Soft, Non distended, Non tender.  +Bowel sounds  Neurologic:  Alert and oriented X 3, normal  Speech,  Psych:   Fair   insight. Not anxious nor agitated  Skin:  No rashes. No jaundice    Reviewed most current lab test results and cultures  YES  Reviewed most current radiology test results   YES  Review and summation of old records today    NO  Reviewed patient's current orders and MAR    YES  PMH/ reviewed - no change compared to H&P  ________________________________________________________________________  Care Plan discussed with:    Comments   Patient y    Family      RN y    Care Manager     Consultant                        Multidiciplinary team rounds were held today with , nursing, pharmacist and clinical coordinator. Patient's plan of care was discussed; medications were reviewed and discharge planning was addressed.      ________________________________________________________________________  Total NON critical care TIME:  35   Minutes    Total CRITICAL CARE TIME Spent:   Minutes non procedure based      Comments   >50% of visit spent in counseling and coordination of care y ________________________________________________________________________  Samir Morgan MD     Procedures: see electronic medical records for all procedures/Xrays and details which were not copied into this note but were reviewed prior to creation of Plan. LABS:  I reviewed today's most current labs and imaging studies.   Pertinent labs include:  Recent Labs     05/13/19 0608 05/12/19 0605 05/11/19  0608   WBC 7.7 8.9 9.5   HGB 10.1* 10.6* 10.8*   HCT 31.5* 32.4* 34.0*   * 95* 96*     Recent Labs     05/13/19 0608 05/12/19 0605 05/11/19  0608    142 143   K 4.1 3.9 4.1   * 109* 108   CO2 22 23 27   GLU 96 106* 96   BUN 31* 29* 26*   CREA 1.31* 1.17 1.34*   CA 7.7* 8.0* 8.6   MG 2.1  --   --    ALB 2.5* 2.9*  --    TBILI 0.7 1.1*  --    SGOT 32 25  --    ALT 12 15  --        Signed: Samir Morgan MD

## 2019-05-13 NOTE — PROGRESS NOTES
Problem: Mobility Impaired (Adult and Pediatric)  Goal: *Acute Goals and Plan of Care (Insert Text)  Description  Physical Therapy Goals  Initiated 5/13/2019    1. Patient will move from supine to sit and sit to supine , scoot up and down and roll side to side in bed with moderate assistance  within 4 days. 2. Patient will perform sit to stand with moderate assistance  within 4 days. 3. Will establish appropriate goals for transfers and ambulation within 7 days. 4. Patient will perform THR home exercise program per protocol with supervision/set-up within 4 days. Outcome: Not Met    PHYSICAL THERAPY EVALUATION  Patient: Cricket Mcclure (57 y.o. male)  Date: 5/13/2019  Primary Diagnosis: Closed left femoral fracture ([facility] [address] [S72.92XA]  Procedure(s) (LRB):  LEFT HIP HEMIARTHROPLASTY (Left) 1 Day Post-Op   Precautions: WBAT; posterior hip precautions       ASSESSMENT :  Based on the objective data described below, the patient presents with confusion and drowsiness. Patient demonstrating significantly impaired functional mobility and balance with poor activity tolerance also noted. Patient requiring total A of 2 for bed mobility and demonstrates poor sitting balance with constant posterior lean noted. Patient demonstrates increased resistance to assistance which increases risk of falls. Limited evaluation completed today secondary to orthostatic hypotension in sitting. Daughter providing PLOF and states that patient lives at [facility] and uses cane for ambulation in general but has rollatoras needed. Patient is significantly below his functional baseline and daughter reporting increased confusion also. Recommend SNF following discharge     Patient will benefit from skilled intervention to address the above impairments. Patient?s rehabilitation potential is considered to be Fair  Factors which may influence rehabilitation potential include:   ? None noted  ? Mental ability/status  ?          Medical condition  ? Home/family situation and support systems  ? Safety awareness  ? Pain tolerance/management  ? Other:      PLAN :  Recommendations and Planned Interventions:  ?           Bed Mobility Training             ? Neuromuscular Re-Education  ? Transfer Training                   ? Orthotic/Prosthetic Training  ? Gait Training                         ? Modalities  ? Therapeutic Exercises           ? Edema Management/Control  ? Therapeutic Activities            ? Patient and Family Training/Education  ? Other (comment):    Frequency/Duration: Patient will be followed by physical therapy  daily to address goals. Discharge Recommendations: Basil Tariq  Further Equipment Recommendations for Discharge: TBD by rehab      SUBJECTIVE:   Patient stated ? My leg is sore. ?    OBJECTIVE DATA SUMMARY:   HISTORY:    Past Medical History:   Diagnosis Date    Anemia     Anxiety     Arrhythmia     Afib    Chronic kidney disease     Constipation     Depression     Hypercholesterolemia     Hypertension     NSTEMI (non-ST elevated myocardial infarction) ([facility] [address] 11/21/2016    [facility]:  JACKELIN to SVG-L-PLB and JACKELIN to SVG-LADstents 11/16    Pacemaker 2009    Followed By Dr Liz Wiseman    S/P CABG x 4 1995    Thyroid disease     hypothyroidism     Past Surgical History:   Procedure Laterality Date    CARDIAC SURG PROCEDURE UNLIST      pacer    CARDIAC SURG PROCEDURE UNLIST  07/20/1995    quadruple bypass     COLONOSCOPY N/A 11/6/2018    COLONOSCOPY performed by Eric Garcia MD at [facility] ENDOSCOPY    HX CORONARY ARTERY BYPASS GRAFT  1995    4V    HX PACEMAKER  04/23/2003     Prior Level of Function/Home Situation: patient unable to provide any PLOF; daughter present and states patient lives in [facility] and uses a cane or occasionally a rollator for ambulation      Home Situation  [address]: [address] Name: James Moraida  # Steps to Enter: 0  One/Two Story Residence: One story  Living Alone: No  Support Systems: Assisted living  Patient Expects to be Discharged to[de-identified] Rehabilitation facility  Current DME Used/Available at Home: Genaro carias, Macky Kussmaul, straight, Walker, rollator    EXAMINATION/PRESENTATION/DECISION MAKING:   Critical Behavior:  Neurologic State: Alert  Orientation Level: Oriented to person only  Cognition: Follows commands  Safety/Judgement: Fall prevention  Hearing: Auditory  Auditory Impairment: Hard of hearing, bilateral, Hearing aid(s)(hearing aids at home)    Range Of Motion:  AROM: Generally decreased, functional           PROM: Generally decreased, functional           Strength:    Strength: Generally decreased, functional                    Tone & Sensation:   Tone: Normal              Sensation: Intact               Coordination:  Coordination: Generally decreased, functional  V  Functional Mobility:  Bed Mobility:    Supine to Sit: Total assistance;Assist x2  Sit to Supine: Total assistance;Assist x2  Scooting: Total assistance  Transfers:  Not able to safely attempt standing secondary to hypotension                       Balance:   Sitting: Impaired  Sitting - Static: Poor (constant support)(constant posterior lean)  Sitting - Dynamic: Poor (constant support)  Standing: Impaired; Without support  Standing - Static: Fair  Standing - Dynamic : Poor      Functional Measure:    Elder Mobility Scale    0/20         Scores under 10 - generally these patients are dependent in mobility maneuvers; require help with  basic ADL, such as transfers, toileting and dressing. Scores between 10 - 13 - generally these patients are borderline in terms of safe mobility and  independence in ADL i.e. they require some help with some mobility maneuvers. Scores over 14 - Generally these patients are able to perform mobility maneuvers alone and safely  and are independent in basic ADL. Pain:  Pain Scale 1: Numeric (0 - 10)  Pain Intensity 1: 0              Activity Tolerance:   Vitals:    05/13/19 1449 05/13/19 1452 05/13/19 1455 05/13/19 1519   BP: (!) 88/53 (!) 86/50 97/54 116/58   BP 1 Location: Right arm Right arm Right arm Right arm   BP Patient Position: Sitting Sitting  Comment: post exercises Supine At rest   Pulse: 62 62 64 62   Resp:    17   Temp:    98 °F (36.7 °C)   SpO2:    96%   Weight:          Please refer to the flowsheet for vital signs taken during this treatment. After treatment:   ?         Patient left in no apparent distress sitting up in chair  ? Patient left in no apparent distress in bed  ? Call bell left within reach  ? Nursing notified  ? Caregiver present  ? Bed alarm activated    COMMUNICATION/EDUCATION:   The patient?s plan of care was discussed with: Occupational Therapist and Registered Nurse. ?         Fall prevention education was provided and the patient/caregiver indicated understanding. ? Patient/family have participated as able in goal setting and plan of care. ?         Patient/family agree to work toward stated goals and plan of care. ?         Patient understands intent and goals of therapy, but is neutral about his/her participation. ? Patient is unable to participate in goal setting and plan of care.     Thank you for this referral.  Sumi Jamil, PT   Time Calculation: 30 mins

## 2019-05-13 NOTE — PROGRESS NOTES
Reason for Admission: hip fracture                 RRAT Score:  39                Resources/supports as identified by patient/family: family support                   Top Challenges facing patient (as identified by patient/family and CM): Finances/Medication cost? Patient has insurance to assist with medications                 Transportation? Patient does not drive and relies on family or facility for transportation              Support system or lack thereof? Family support                     Living arrangements? Patient has been a resident at [facility] in [place] since 2009             Self-care/ADLs/Cognition? ADLs (sometimes needs assistance making bed, laundry (done by daughter), putting in hearing aids, medication administration), IADLs (uses cane and rollator)           Current Advanced Directive/Advance Care Plan: On file                           Plan for utilizing home health:  No recommendations at this time                         Likelihood of readmission: high                 Transition of Care Plan:                  CM made room visit with patient who was alert and oriented x1 with daughter at bedside. Pt's dtr confirmed demographics, emergency contact, insurance, and PCP (last seen 5/9/19). Patient's daughter stated that patient has no history of HH and has never been to another facility other than the facility he is at now. Pt's dtr stated that pt's residential has a healthcare side that he has been to in the past after hospitalizations and would like for pt to go there upon d/c. FOC signed and on bedside chart. CM has sent referral to [facility] in [place] via [employer]. Care Management Interventions  PCP Verified by CM: Yes  Last Visit to PCP: 05/09/19  Palliative Care Criteria Met (RRAT>21 & CHF Dx)?: Yes  Palliative Consult Recommended?: Yes(CHF_ Bundle- protocol)  Mode of Transport at Discharge:  Other (see comment)  Transition of Care Consult (CM Consult): Discharge Planning  Discharge Durable Medical Equipment: No  Physical Therapy Consult: Yes  Occupational Therapy Consult: Yes  Speech Therapy Consult: No  Current Support Network: Assisted Living(Patient resides at [facility] in [place] )  Confirm Follow Up Transport: Family  Plan discussed with Pt/Family/Caregiver: Yes  Freedom of Choice Offered: Yes  Discharge Location  Dis[facility] Placement[facility] [address] [place], [place] [address] [address] [facility]

## 2019-05-13 NOTE — PROGRESS NOTES
Physical Therapy  Consult received and chart reviewed. Spoke with OT who has just completed evaluation. Patient demonstrating orthostatic hypotension in standing with BP dropping to 77/44. Will defer PT evaluation at this time and follow back later.   Sonia Baez, PT

## 2019-05-13 NOTE — INTERDISCIPLINARY ROUNDS
Interdisciplinary rounds were held today to discuss patient plan of care and outcomes. The following members were present: Nurse Practitioner, Nurse, Clinical Care Leader, Pharmacy, Physical Therapy, and Case Management. Plan:  Lovenox, Protonix. Continue PT/OT. Plan to return to health care side of [facility].

## 2019-05-13 NOTE — PROGRESS NOTES
Initial Nutrition Assessment:    INTERVENTIONS/RECOMMENDATIONS:   · Consider regular diet due to advanced age  · Ensure TID (chocolate)   · Encourage PO intake  · Please document % meals and supplements consumed in flowsheet     ASSESSMENT:   Chart reviewed, medically noted for Closed left femoral fracture s/p LEFT HIP HEMIARTHROPLASTY and PMH shown below. We discussed the role that nutrition plays in healing after a fracture/surgery, specifically focusing on protein sources at each meal as well as nutrition supplements to help meet pt protein needs. He agreed to try ensure. Past Medical History:   Diagnosis Date    Anemia     Anxiety     Arrhythmia     Afib    Chronic kidney disease     Constipation     Depression     Hypercholesterolemia     Hypertension     NSTEMI (non-ST elevated myocardial infarction) ([facility] [address] 11/21/2016    [facility]:  JACKELIN to SVG-L-PLB and JACKELIN to SVG-LADstents 11/16    Pacemaker 2009    Followed By Dr Yohannes Gee S/P CABG x 4 1995    Thyroid disease     hypothyroidism       Diet Order: Cardiac  % Eaten:  No data found.   Pertinent Medications: [x]Reviewed:   Pertinent Labs: [x]Reviewed:   Food Allergies: [x]NKFA  []Other   Last BM: 5/12  Edema:   trace     []RUE   []LUE   []RLE   [x]LLE      Pressure Injury:  n/a    [] Stage I   [] Stage II   [] Stage III   [] Stage IV      Wt Readings from Last 30 Encounters:   05/09/19 70.8 kg (156 lb)   04/18/19 70.8 kg (156 lb)   02/13/19 70.9 kg (156 lb 3.2 oz)   11/14/18 68.6 kg (151 lb 3.2 oz)   11/06/18 68.5 kg (151 lb 0.2 oz)   11/01/18 68.5 kg (151 lb)   10/15/18 69.9 kg (154 lb)   07/09/18 68.9 kg (151 lb 12.8 oz)   04/09/18 70.2 kg (154 lb 12.8 oz)   02/26/18 70.9 kg (156 lb 3.2 oz)   01/31/18 70.9 kg (156 lb 3.2 oz)   01/31/18 70.9 kg (156 lb 3.2 oz)   01/24/18 71.4 kg (157 lb 6.4 oz)   01/17/18 71.3 kg (157 lb 3.2 oz)   01/03/18 69.7 kg (153 lb 9.6 oz)   12/26/17 69.9 kg (154 lb)   12/19/17 70.8 kg (156 lb)   12/18/17 70.6 kg (155 lb 9.6 oz)   12/07/17 70.1 kg (154 lb 9.6 oz)   11/15/17 70.8 kg (156 lb)   03/01/17 68.5 kg (151 lb)   12/15/16 69.4 kg (153 lb)   09/19/16 71.7 kg (158 lb)   08/15/16 71.2 kg (157 lb)   08/03/16 70.3 kg (155 lb)   07/18/16 70.3 kg (155 lb)   07/06/16 70.8 kg (156 lb)   06/27/16 71.7 kg (158 lb)   06/20/16 71.4 kg (157 lb 8 oz)   06/06/16 71.2 kg (157 lb)       Anthropometrics:   Height:   Weight:     IBW (%IBW):   ( ) UBW (%UBW):   (  %)   Last Weight Metrics:  Weight Loss Metrics 5/9/2019 4/18/2019 2/13/2019 11/14/2018 11/6/2018 11/1/2018 11/1/2018   Today's Wt 156 lb 156 lb 156 lb 3.2 oz 151 lb 3.2 oz 151 lb 0.2 oz - 151 lb   BMI 21.76 kg/m2 21.76 kg/m2 21.79 kg/m2 21.09 kg/m2 - 21.06 kg/m2 -       BMI: There is no height or weight on file to calculate BMI. This BMI is indicative of:   []Underweight    [x]Normal    []Overweight    [] Obesity   [] Extreme Obesity (BMI>40)     Estimated Nutrition Needs (Based on):   1850 Kcals/day(BMR: 1425 x 1.3) , 80 g(1 g/kg) Protein  Carbohydrate:  At Least 130 g/day  Fluids: 1850 mL/day (1ml/kcal) or per primary team    NUTRITION DIAGNOSES:   Problem:  Increased nutrient needs      Etiology: related to fracture healing     Signs/Symptoms: as evidenced by Closed left femoral fracture s/p LEFT HIP HEMIARTHROPLASTY      NUTRITION INTERVENTIONS:  Meals/Snacks: General/healthful diet   Supplements: Commercial supplement              GOAL:   consume >50% of meals and ONS in 3-5 days    LEARNING NEEDS (Diet, Food/Nutrient-Drug Interaction):    [] None Identified   [x] Identified and Education Provided/Documented   [] Identified and Pt declined/was not appropriate     Cultureal, Restoration, OR Ethnic Dietary Needs:    [x] None Identified   [] Identified and Addressed     [x] Interdisciplinary Care Plan Reviewed/Documented    [x] Discharge Planning: General healthy diet with adequate kcal and protein to promote fracture healing        MONITORING /EVALUATION:      Food/Nutrient Intake Outcomes:  Total energy intake  Physical Signs/Symptoms Outcomes: Electrolyte and renal profile, Weight/weight change, GI    NUTRITION RISK:    [] High              [x] Moderate           []  Low  []  Minimal/Uncompromised    PT SEEN FOR:    [x]  MD Consult: []Calorie Count      []Diabetic Diet Education        []Diet Education     []Electrolyte Management     [x]General Nutrition Management and Supplements     []Management of Tube Feeding     []TPN Recommendations    []  RN Referral:  []MST score >=2     []Enteral/Parenteral Nutrition PTA     []Pregnant: Gestational DM or Multigestation     []Pressure Ulcer/Wound Care needs        []  Low BMI  []  LOS Referral       Maura Balderrama RDN  Pager [phone]  Weekend Pager [phone]

## 2019-05-13 NOTE — PROGRESS NOTES
ORTHO POST OP PROGRESS NOTE    May 13, 2019  Admit Date: 2019  Admit Diagnosis: Closed left femoral fracture ([address] [S72.92XA]  Procedure: Procedure(s):  LEFT HIP HEMIARTHROPLASTY  Post Op day: 1 Day Post-Op    Subjective:     Luisa Alvarez is a patient who has complaints Of mild right hip pain postop day 1 status post left hip hemiarthroplasty. Review of Systems: Pertinent items are noted in HPI. Objective:     PT/OT:   Distance Ambulated:           Time Ambulated (min):        Ambulation Response:        Assistive Device:              Assistive Device: Fall prevention device    Vital Signs:    Blood pressure 133/50, pulse 60, temperature 98.6 °F (37 °C), resp. rate 16, SpO2 97 %. Temp (24hrs), Av.9 °F (36.6 °C), Min:97 °F (36.1 °C), Max:99.3 °F (37.4 °C)      No intake/output data recorded.  1901 -  0700  In: 2019.2 [P.O.:240; I.V.:1779.2]  Out: 1600 [Urine:1500]    LAB:    Recent Labs     19  0608   HGB 10.1*   WBC 7.7   *       Wound/Drain Assessment:  Drain:      Dressing:     Physical Exam:  Incision clean, dry, and intact  nvi    Assessment:      Patient Active Problem List   Diagnosis Code    Dementia F03.90    Atrial fibrillation ([facility]) I48.91    NSTEMI (non-ST elevated myocardial infarction) ([facility]) I21.4    S/P CABG x 4 Z95.1    Pacemaker Z95.0    Iron deficiency anemia D50.9    Anemia in stage 3 chronic kidney disease ([facility]) N18.3, D63.1    GI bleed K92.2    Closed fracture of neck of left femur ([facility]) S72.002A    Current use of long term anticoagulation Z79.01    Femoral neck fracture ([facility]) S72.009A    Closed left femoral fracture ([facility]) S72. 92XA       Plan:     Continue PT/OT/Rehab  Discontinue:  Consult: PT  and OT    Discharge To: SNF   can resume eliquis

## 2019-05-13 NOTE — PROGRESS NOTES
Orthopedic End of Shift Note    Bedside shift change report given to ANABEL Mills (oncoming nurse) by Roge Farris (offgoing nurse). Report included the following information SBAR, Kardex, Procedure Summary, Intake/Output and Recent Results. POD#  1    Significant issues during shift: BP dropped with therapy    Issues for Physician to address:      Activity This Shift  (check all that apply) [] chair  [] dangle   [] bathroom  [] bedside commode [] hallway  [x] bedrest   Nausea/Vomiting [] yes [x] no     Voiding Status [] void [x] Russell [] I&O Cath   Bowel Movements [] yes [x] no     Foot Pumps or SCD [x] yes [] no    Ice Pack [x] yes    [] no    Incentive Spirometer [x] yes [] no Volume:      Telemetry Monitoring   [x] yes [] no Rhythm:   Supplemental O2 [x] yes [] no Sat off O2:   %

## 2019-05-14 LAB
ALBUMIN SERPL-MCNC: 2.2 G/DL (ref 3.5–5)
ALBUMIN/GLOB SERPL: 0.6 {RATIO} (ref 1.1–2.2)
ALP SERPL-CCNC: 84 U/L (ref 45–117)
ALT SERPL-CCNC: 9 U/L (ref 12–78)
ANION GAP SERPL CALC-SCNC: 6 MMOL/L (ref 5–15)
AST SERPL-CCNC: 38 U/L (ref 15–37)
BASOPHILS # BLD: 0 K/UL (ref 0–0.1)
BASOPHILS NFR BLD: 0 % (ref 0–1)
BILIRUB SERPL-MCNC: 0.6 MG/DL (ref 0.2–1)
BUN SERPL-MCNC: 29 MG/DL (ref 6–20)
BUN/CREAT SERPL: 28 (ref 12–20)
CALCIUM SERPL-MCNC: 7.8 MG/DL (ref 8.5–10.1)
CHLORIDE SERPL-SCNC: 114 MMOL/L (ref 97–108)
CO2 SERPL-SCNC: 24 MMOL/L (ref 21–32)
CREAT SERPL-MCNC: 1.05 MG/DL (ref 0.7–1.3)
DIFFERENTIAL METHOD BLD: ABNORMAL
EOSINOPHIL # BLD: 0.7 K/UL (ref 0–0.4)
EOSINOPHIL NFR BLD: 10 % (ref 0–7)
ERYTHROCYTE [DISTWIDTH] IN BLOOD BY AUTOMATED COUNT: 14.2 % (ref 11.5–14.5)
GLOBULIN SER CALC-MCNC: 3.7 G/DL (ref 2–4)
GLUCOSE SERPL-MCNC: 99 MG/DL (ref 65–100)
HCT VFR BLD AUTO: 28.5 % (ref 36.6–50.3)
HCT VFR BLD AUTO: 29.9 % (ref 36.6–50.3)
HGB BLD-MCNC: 9 G/DL (ref 12.1–17)
HGB BLD-MCNC: 9.3 G/DL (ref 12.1–17)
IMM GRANULOCYTES # BLD AUTO: 0 K/UL (ref 0–0.04)
IMM GRANULOCYTES NFR BLD AUTO: 0 % (ref 0–0.5)
LYMPHOCYTES # BLD: 1.1 K/UL (ref 0.8–3.5)
LYMPHOCYTES NFR BLD: 15 % (ref 12–49)
MAGNESIUM SERPL-MCNC: 2.2 MG/DL (ref 1.6–2.4)
MCH RBC QN AUTO: 30 PG (ref 26–34)
MCHC RBC AUTO-ENTMCNC: 31.1 G/DL (ref 30–36.5)
MCV RBC AUTO: 96.5 FL (ref 80–99)
MONOCYTES # BLD: 0.8 K/UL (ref 0–1)
MONOCYTES NFR BLD: 11 % (ref 5–13)
NEUTS SEG # BLD: 4.8 K/UL (ref 1.8–8)
NEUTS SEG NFR BLD: 64 % (ref 32–75)
NRBC # BLD: 0 K/UL (ref 0–0.01)
NRBC BLD-RTO: 0 PER 100 WBC
PLATELET # BLD AUTO: 96 K/UL (ref 150–400)
PMV BLD AUTO: 10.4 FL (ref 8.9–12.9)
POTASSIUM SERPL-SCNC: 3.9 MMOL/L (ref 3.5–5.1)
PROT SERPL-MCNC: 5.9 G/DL (ref 6.4–8.2)
RBC # BLD AUTO: 3.1 M/UL (ref 4.1–5.7)
SODIUM SERPL-SCNC: 144 MMOL/L (ref 136–145)
WBC # BLD AUTO: 7.5 K/UL (ref 4.1–11.1)

## 2019-05-14 PROCEDURE — 85025 COMPLETE CBC W/AUTO DIFF WBC: CPT

## 2019-05-14 PROCEDURE — 94760 N-INVAS EAR/PLS OXIMETRY 1: CPT

## 2019-05-14 PROCEDURE — 92610 EVALUATE SWALLOWING FUNCTION: CPT

## 2019-05-14 PROCEDURE — 65660000000 HC RM CCU STEPDOWN

## 2019-05-14 PROCEDURE — 85018 HEMOGLOBIN: CPT

## 2019-05-14 PROCEDURE — 77010033678 HC OXYGEN DAILY

## 2019-05-14 PROCEDURE — 80053 COMPREHEN METABOLIC PANEL: CPT

## 2019-05-14 PROCEDURE — 97116 GAIT TRAINING THERAPY: CPT | Performed by: PHYSICAL THERAPIST

## 2019-05-14 PROCEDURE — 74011250637 HC RX REV CODE- 250/637: Performed by: NURSE PRACTITIONER

## 2019-05-14 PROCEDURE — 74011250636 HC RX REV CODE- 250/636: Performed by: INTERNAL MEDICINE

## 2019-05-14 PROCEDURE — 74011250637 HC RX REV CODE- 250/637: Performed by: ORTHOPAEDIC SURGERY

## 2019-05-14 PROCEDURE — 83735 ASSAY OF MAGNESIUM: CPT

## 2019-05-14 PROCEDURE — 97530 THERAPEUTIC ACTIVITIES: CPT | Performed by: PHYSICAL THERAPIST

## 2019-05-14 PROCEDURE — 36415 COLL VENOUS BLD VENIPUNCTURE: CPT

## 2019-05-14 PROCEDURE — 97535 SELF CARE MNGMENT TRAINING: CPT

## 2019-05-14 RX ORDER — OXYCODONE HYDROCHLORIDE 5 MG/1
5 TABLET ORAL
Qty: 30 TAB | Refills: 0 | OUTPATIENT
Start: 2019-05-14 | End: 2019-05-28

## 2019-05-14 RX ADMIN — LEVOTHYROXINE SODIUM 75 MCG: 75 TABLET ORAL at 06:36

## 2019-05-14 RX ADMIN — POLYETHYLENE GLYCOL 3350 17 G: 17 POWDER, FOR SOLUTION ORAL at 09:39

## 2019-05-14 RX ADMIN — Medication 10 ML: at 14:10

## 2019-05-14 RX ADMIN — FERROUS SULFATE TAB 325 MG (65 MG ELEMENTAL FE) 325 MG: 325 (65 FE) TAB at 09:40

## 2019-05-14 RX ADMIN — Medication 1 CAPSULE: at 09:40

## 2019-05-14 RX ADMIN — CALCIUM CARBONATE-VITAMIN D TAB 500 MG-200 UNIT 1 TABLET: 500-200 TAB at 18:10

## 2019-05-14 RX ADMIN — SENNOSIDES AND DOCUSATE SODIUM 1 TABLET: 8.6; 5 TABLET ORAL at 18:10

## 2019-05-14 RX ADMIN — SENNOSIDES AND DOCUSATE SODIUM 1 TABLET: 8.6; 5 TABLET ORAL at 09:40

## 2019-05-14 RX ADMIN — SODIUM CHLORIDE 50 ML/HR: 900 INJECTION, SOLUTION INTRAVENOUS at 05:36

## 2019-05-14 RX ADMIN — ACETAMINOPHEN 1000 MG: 500 TABLET ORAL at 06:36

## 2019-05-14 RX ADMIN — Medication 10 ML: at 22:19

## 2019-05-14 RX ADMIN — APIXABAN 2.5 MG: 2.5 TABLET, FILM COATED ORAL at 09:40

## 2019-05-14 RX ADMIN — CALCIUM CARBONATE-VITAMIN D TAB 500 MG-200 UNIT 1 TABLET: 500-200 TAB at 09:40

## 2019-05-14 RX ADMIN — FINASTERIDE 5 MG: 5 TABLET, FILM COATED ORAL at 09:40

## 2019-05-14 RX ADMIN — ATORVASTATIN CALCIUM 80 MG: 40 TABLET, FILM COATED ORAL at 09:40

## 2019-05-14 RX ADMIN — DOCUSATE SODIUM 100 MG: 100 CAPSULE, LIQUID FILLED ORAL at 18:10

## 2019-05-14 RX ADMIN — APIXABAN 2.5 MG: 2.5 TABLET, FILM COATED ORAL at 22:18

## 2019-05-14 RX ADMIN — MULTIPLE VITAMINS W/ MINERALS TAB 1 TABLET: TAB at 09:40

## 2019-05-14 RX ADMIN — ACETAMINOPHEN 1000 MG: 500 TABLET ORAL at 18:10

## 2019-05-14 RX ADMIN — PANTOPRAZOLE SODIUM 40 MG: 40 TABLET, DELAYED RELEASE ORAL at 09:40

## 2019-05-14 RX ADMIN — LORATADINE 10 MG: 10 TABLET ORAL at 09:40

## 2019-05-14 RX ADMIN — CALCIUM CARBONATE-VITAMIN D TAB 500 MG-200 UNIT 1 TABLET: 500-200 TAB at 14:09

## 2019-05-14 RX ADMIN — Medication 10 ML: at 00:23

## 2019-05-14 RX ADMIN — ACETAMINOPHEN 1000 MG: 500 TABLET ORAL at 14:10

## 2019-05-14 RX ADMIN — DOCUSATE SODIUM 100 MG: 100 CAPSULE, LIQUID FILLED ORAL at 09:40

## 2019-05-14 RX ADMIN — ESCITALOPRAM OXALATE 5 MG: 10 TABLET ORAL at 09:40

## 2019-05-14 RX ADMIN — ACETAMINOPHEN 1000 MG: 500 TABLET ORAL at 00:21

## 2019-05-14 NOTE — PROGRESS NOTES
PLAN OF CARE: [facility] in [place]   1) [facility] transport set up for 8:30am tomorrow morning    SARAH has left message on Director of Nursing at [facility] phone requesting a return phone call regarding d/c planning. UPDATE 4:11PM    SARAH spoke to D.O.N. (helen: [phone]) at [facility] who stated that they are able to accept patient back tomorrow. SARAH contacted MD who was agreeable to pt d/c tomorrow. Helen requested that d/c summary be faxed to [phone]; sarah has sent d/c summary. CM has let patient's daughter know and she had some concerns about d/c and would like MD to call her regarding concerns. MD contacted daughter and concerns have been covered and daughter accepting of d/c per MD.     [facility] transport is set up for 8:30am tomorrow morning. Call report: [phone]. If patient is medically unable to transport at this time CM will push back transport time first thing in the morning if medically needed (CM to arrive at 7:30am tomorrow morning).      Giovanni Terry, [address], [address]

## 2019-05-14 NOTE — PROGRESS NOTES
Orthopedic End of Shift Note    Bedside and Verbal shift change report given to Nneka Tuttle RN (oncoming nurse) by Kandice Leventhal (offgoing nurse). Report included the following information SBAR, Kardex, Procedure Summary, Intake/Output, MAR and Recent Results.      POD# 3    Significant issues during shift: Na    Issues for Physician to address: na    Activity This Shift  (check all that apply) [] chair  [] dangle   [] bathroom  [] bedside commode [] hallway  [x] bedrest   Nausea/Vomiting [] yes [x] no     Voiding Status [] void [x] Russell [] I&O Cath   Bowel Movements [] yes [x] no     Foot Pumps or SCD [x] yes [] no    Ice Pack [x] yes    [] no    Incentive Spirometer [x] yes [] no Volume:       Telemetry Monitoring   [x] yes [] no Rhythm:Paced   Supplemental O2 [x] yes [] no Sat off O2:   89%

## 2019-05-14 NOTE — PROGRESS NOTES
Pharmacy Automatic Direct Oral Anticoagulant Renal Dosing Protocol    Patient currently receiving Apixaban 2.5 mg bid with an indication of Atrial Fibrillation. Start date: Resume from home    Major interacting medications: none significant    Platelet inhibitors (dose/frequency): n/a    Labs:  Recent Labs     05/14/19  0511 05/13/19  0608 05/12/19  0605   CREA 1.05 1.31* 1.17   HGB 9.3* 10.1* 10.6*   PLT 96* 108* 95*     Wt Readings from Last 1 Encounters:   05/13/19 77.2 kg (170 lb 3.2 oz)        Creatinine Clearance: 44 ml/min    Impression/Plan:   - Resume Apixaban 2.5mg BID  - BMP & CBC w/o diff every other day     Pharmacy will follow daily and adjust as appropriate. Thank you,  Sarah Gurrola [facility]      Child Johns Score calculator  [employer].      Apixaban  Dabigatran  Edoxaban  Rivaroxaban    [email or web address]. [email or web address]

## 2019-05-14 NOTE — PROGRESS NOTES
Problem: Mobility Impaired (Adult and Pediatric)  Goal: *Acute Goals and Plan of Care (Insert Text)  Description  Physical Therapy Additional Goals, 5/14/2019  Physical Therapy Goals  Initiated 5/14/2019  1. Patient will transfer from bed to chair and chair to bed with moderate assistance  using the least restrictive device within 7 day(s). 2.  Patient will ambulate with moderate assistance  for 10 feet with the least restrictive device within 7 day(s). Physical Therapy Goals  Initiated 5/13/2019    1. Patient will move from supine to sit and sit to supine , scoot up and down and roll side to side in bed with moderate assistance  within 4 days. 2. Patient will perform sit to stand with moderate assistance  within 4 days. 3. Will establish appropriate goals for transfers and ambulation within 7 days. 4. Patient will perform THR home exercise program per protocol with supervision/set-up within 4 days. Outcome: Progressing Towards Goal   PHYSICAL THERAPY TREATMENT  Patient: Abdiel Whelan (3 y.o. male)  Date: 5/14/2019  Diagnosis: Closed left femoral fracture ([facility] [address] [S72.92XA] <principal problem not specified>  Procedure(s) (LRB):  LEFT HIP HEMIARTHROPLASTY (Left) 2 Days Post-Op  Precautions:  WBAT LLE; falls posterior hip precautions  Chart, physical therapy assessment, plan of care and goals were reviewed. ASSESSMENT: Patient remains confused but is able to follow commands and is more verbally interactive today. Patient continues to require significant assistance of 2 people for overall mobility. Patient educated on posterior hip precautions. No family present during tx session and uncertain of patient's understanding/ ability to recall precautions. Patient requiring max to total A of 2 for bed mobility and max A of 2 for transfers. Patient attempting to take a few steps to [facility] and demonstrates poor ability to advance BLEs.  Patient hypotensive immediately following BM but BP improved slightly with abdominal tightening exercises. Patient returned to bed with total A and bed placed in chair position. Continue to recommend SNF following discharge. Progression toward goals:  ?    Improving appropriately and progressing toward goals  x? Improving slowly and progressing toward goals  ? Not making progress toward goals and plan of care will be adjusted     PLAN:  Patient continues to benefit from skilled intervention to address the above impairments. Continue treatment per established plan of care. Discharge Recommendations:  Skilled Nursing Facility  Further Equipment Recommendations for Discharge:  TBD by SNF     SUBJECTIVE:   Patient stated Am I at 90 degrees? Gaby Patient    OBJECTIVE DATA SUMMARY:   Critical Behavior:  Neurologic State: Alert, Appropriate for age  Orientation Level: Oriented to person, Oriented to place, Oriented to situation  Cognition: Follows commands  Safety/Judgement: Fall prevention  Functional Mobility Training:  Bed Mobility:     Supine to Sit: Maximum assistance;Assist x2  Sit to Supine: Total assistance;Assist x2  Scooting: Total assistance;Assist x2        Transfers:  Sit to Stand: Maximum assistance;Assist x2  Stand to Sit: Assist x2;Maximum assistance                             Balance:  Sitting: Impaired  Sitting - Static: Poor (constant support)  Sitting - Dynamic: Not tested  Standing: Impaired  Standing - Static: Poor  Standing - Dynamic : Poor  Ambulation/Gait Training:  Distance (ft): (Few steps to [facility], then back to bed)  Assistive Device: Gait belt;Walker, rolling  Ambulation - Level of Assistance: Maximum assistance;Assist x2        Gait Abnormalities: Decreased step clearance(Poor ability to advance BLE)  Right Side Weight Bearing: Full  Left Side Weight Bearing: As tolerated  Base of Support: Widened;Center of gravity altered(constant posterior lean)     Speed/Kristin: Delayed;Pace decreased (<100 feet/min); Slow  Step Length: Left shortened;Right shortened Pain:  Pain Scale 1: Numeric (0 - 10)  Pain Intensity 1: 0              Activity Tolerance:   Patient Vitals for the past 4 hrs:    Temp Pulse Resp BP SpO2   05/14/19 1514 97.9 °F (36.6 °C) (!) 59 18 126/40 97 %   05/14/19 1223 -- -- -- 100/50 --   05/14/19 1219 -- -- -- (!) 82/53 --   05/14/19 1206 -- 93 -- 100/53 91 %           Please refer to the flowsheet for vital signs taken during this treatment. After treatment:   ?    Patient left in no apparent distress sitting up in chair  x? Patient left in no apparent distress in bed placed in chair position  x? Call bell left within reach  x? Nursing notified  x? Caregiver present  ?     Bed alarm activated    COMMUNICATION/COLLABORATION:   The patients plan of care was discussed with: Occupational Therapist and Registered Nurse    Reyes Lugo, PT   Time Calculation: 46 mins

## 2019-05-14 NOTE — DISCHARGE INSTRUCTIONS
[address] [facility]    Discharge Instruction Sheet: Hip Fracture Repair    Dr. Marisol Ball    Blood Clot Prevention  Lovenox ***   You will be discharged on Lovenox. It helps prevent blood clots.  It is an injection that you receive in the side of your stomach.  Nursing will teach you or a caregiver how to do this before you go home.  Patients take Lovenox for three weeks after surgery. Pain control:  Medications   Typically, we will prescribe a narcotic usually 1-2 tabs every three to four hours as needed for your pain. Most patients need this only for the first few weeks.  You should discontinue this as the pain decreases.  Some of these medications contain a large dose of Tylenol (acetaminophen). Therefore, you should consult your pharmacist before taking any additional Tylenol at the same time. You should not drive while taking any narcotic pain medications. Take your pain medications with food to prevent nausea! Your last dose of pain medication in the hospital was given @ :__________    [address] will be discharged home with ice/gel packs.  Continue using these regularly to minimize pain and swelling, especially after physical activity. Constipation   Pain medication and anesthesia can be constipating-this can be prevented by gentle physical activity and drinking plenty of fluid.  It should be treated with over-the-counter medications such as Dulcolax, Miralax, Magnesium Citrate and/or Fleets enema.  You should have a bowel movement at least every other day following surgery. Incision care   You will be discharged with a transparent and waterproof dressing over your incision. o This should remain in place for 5-7 days after discharge.      You will be given one additional dressing to use at home in 5-7 days if you are still having drainage   You may shower with this dressing in place after you are discharged. o After showering pat the dressing/incision dry.  When the incision is no longer draining, it may be left open to the air.  DO NOT rub the incision.  DO NOT take a tub bath or go swimming until cleared by your doctor.  DO NOT apply lotions, oils, or creams to incision.         To increase and promote healing:   Stop Smoking (or at least cut back on smoking).  Eat a well-balanced diet (high in protein and vitamin C)   If your appetite is poor, consider nutritional supplements like Ensure, Glucerna, or [place] Instant Breakfast.   If you are diabetic, controlling you blood sugars is very important to prevent infection and promote wound healing. Nutrition:   If you were on a supplement such as Ensure or Glucerna) while in the hospital, please continue using them with each meal for the next 30 days.  Eat a well-balanced diet - High in protein, high in vitamins and minerals, especially vitamin C and zinc.     Home Health:   If you have staples a home health nurse will remove them in about 10 days.  Physical Therapy will come to your home to continue training for walking, transferring and exercises    Physical Activity     You can weight bear as tolerated on your new hip as tolerated.  Bed-rest may slow your recovery.  Use your walker and take short walks about every 2 hours.  Increase your distance each day.  NO DRIVING until told to do so. Warning signs: Please call your physician immediately at [phone] if you have   Bleeding from incision that is constant.    Change in mental status (unusual behavior or confusion)   If your incision develops redness or swelling   Change in wound drainage (increase in amount, color, or foul odor)   Temperature over 101.5 degrees Fahrenheit    Pain in the calf of your leg   Tenderness or redness in the calf of your leg   Increased swelling of the thigh, ankle, calf, or foot.    Emergency: CALL 911 if you have   Shortness of breath   Chest pain   Localized chest pain when coughing or taking a deep breath    Follow-up    Please call your surgeons office at [phone] for a follow up appointment.   o You should call as soon as you get home or the next day after discharge. o Ask to make an appointment in 2 weeks.  You can return to work when cleared by a physician. During normal business hours you may reach Dr. Diamond Guerrero' team directly at [phone] if you have concerns or questions.     Joey Osborn

## 2019-05-14 NOTE — PROGRESS NOTES
Problem: Self Care Deficits Care Plan (Adult)  Goal: *Acute Goals and Plan of Care (Insert Text)  Description  Occupational Therapy Goals  Initiated 5/13/2019     1. Patient will perform lower body dressing using Reacher  Stocking Aid  Elastic Shoe Laces  Long Handled Shoe Horn at moderate assistance  level within 7 days. 2. Patient will perform toilet transfers at moderate assistance  level using Walkers, Type: Rolling Walker  within 7 days. 3. Patient will perform all aspects of toileting at minimal assistance/contact guard assist level within 7 days. 4. Patient will demonstrate 3/3 hip precautions without cues within 7 days. Outcome: Progressing Towards Goal   OCCUPATIONAL THERAPY TREATMENT  Patient: Socrates Alvarez (83 y.o. male)  Date: 5/14/2019  Diagnosis: Closed left femoral fracture ([address] [S72.92XA] <principal problem not specified>  Procedure(s) (LRB):  LEFT HIP HEMIARTHROPLASTY (Left) 2 Days Post-Op  Precautions:    Chart, occupational therapy assessment, plan of care, and goals were reviewed. ASSESSMENT:  Pt was cleared to be seen for therapy and all VSS and pt was supine in bed . He was oriented to self and place. He was max of 2 for bed mobility and max to total assist to scoot to EOB. He was educated on his hip precautions and unsure if he understood or will remember hip precautions. He did continue to state \"am I at 90 degrees?'. Pt was max of 2 to stand and was max of 2 for transfer to BSc and to clean buttocks. His Vitals were stable during ADLs until after having a BM and BP dropped, SBP 80's. He took a few steps to the [facility] and was put back to be with total assist of 2. Pt was left in chair positioning bed and call bell left with in reach. Family in room . Recommend that pt have rehab at SNF at discharge.   Patient Vitals for the past 4 hrs:   Temp Pulse Resp BP SpO2   05/14/19 1514 97.9 °F (36.6 °C) (!) 59 18 126/40 97 %   05/14/19 1223 -- -- -- 100/50 --   05/14/19 1219 -- -- -- (!) 82/53 --   05/14/19 1206 -- 93 -- 100/53 91 %         Progression toward goals:  ?          Improving appropriately and progressing toward goals  ? Improving slowly and progressing toward goals  ? Not making progress toward goals and plan of care will be adjusted     PLAN:  Patient continues to benefit from skilled intervention to address the above impairments. Continue treatment per established plan of care. Discharge Recommendations:  [facility]  Further Equipment Recommendations for Discharge:  tbd      SUBJECTIVE:   Patient stated I cant bend past 90? Right? Sanchez Negron  The patient stated 1/3 hip precautions. Reviewed all 3 with patient. OBJECTIVE DATA SUMMARY:   Cognitive/Behavioral Status:  Neurologic State: Alert, Confused  Orientation Level: Oriented to person  Cognition: Follows commands, Impaired decision making, No command following  Safety/Judgement: Fall prevention    Functional Mobility and Transfers for ADLs:  Bed Mobility:  Supine to Sit: Maximum assistance;Assist x2  Sit to Supine: Total assistance;Assist x2  Scooting: Total assistance;Assist x2    Transfers:  Sit to Stand: Maximum assistance;Assist x2  Functional Transfers  Toilet Transfer : Maximum assistance;Assist x2        Balance:  Sitting: Impaired  Sitting - Static: Poor (constant support)  Sitting - Dynamic: Not tested  Standing: Impaired  Standing - Static: Poor  Standing - Dynamic : Poor    ADL Intervention and Instruction:  Feeding  Feeding Assistance: Set-up    Grooming  Washing Face: Set-up  Washing Hands: Set-up       Pt is min for UB ADLs and Max for LB          Toileting  Toileting Assistance: Total assistance(dependent)  Bladder Hygiene: Total assistance (dependent)  Bowel Hygiene:  Total assistance (dependent)    Cognitive Retraining  Safety/Judgement: Fall prevention    Pain:  Pain Scale 1: Numeric (0 - 10)  Pain Intensity 1: 0              Activity Tolerance:   fair  Please refer to the flowsheet for vital signs taken during this treatment. After treatment:   ? Patient left in no apparent distress sitting up in chair  ? Patient left in no apparent distress in bed  ? Call bell left within reach  ? Nursing notified  ? Caregiver present  ?  Bed alarm activated    COMMUNICATION/COLLABORATION:   The patients plan of care was discussed with: Physical Therapist and Registered Nurse    Harley Mccoy OT  Time Calculation: 44 mins

## 2019-05-14 NOTE — PROGRESS NOTES
Hospitalist Progress Note    NAME: Tae Colón   :  1923   MRN:  [id number]       Assessment / Plan:  Closed Fracture of left Femur: s/p left hip hemiarthroplasty, under Ortho care. Pain is controlled, due for PT/OT  GLF   H/o Afib on Long term Anticoagulation:eliquis resumed  H/o NSTEMI so far no chest pain  CAD S/p CABGx4  Resume home regimen  Hyperlipidemia: c/w statin  Hypothyroidism: c/w L-thyroxine  Dementia: seems stable, monitor  Surrogate Decision Maker: frederick Bui   Baseline: ambulatory with cane and walker  Lives in assisted living facility  Code status: Full  Prophylaxis: Eliquis whenever OK by Ortho  Recommended Disposition: SNF/LTC      dispo - appropriate for discharge to [facility] tomorrow. Subjective:     Chief Complaint / Reason for Physician Visit  \"no reports of pain or chills\". Discussed with RN events overnight. Review of Systems:  Symptom Y/N Comments  Symptom Y/N Comments   Fever/Chills    Chest Pain     Poor Appetite    Edema     Cough    Abdominal Pain     Sputum    Joint Pain     SOB/YOUNG    Pruritis/Rash     Nausea/vomit    Tolerating PT/OT     Diarrhea    Tolerating Diet     Constipation    Other       Could NOT obtain due to:      Objective:     VITALS:   Last 24hrs VS reviewed since prior progress note.  Most recent are:  Patient Vitals for the past 24 hrs:   Temp Pulse Resp BP SpO2   19 1124 98.1 °F (36.7 °C) 61 18 110/57 90 %   19 0737 97.5 °F (36.4 °C) 60 16 144/58 98 %   19 0451 97.4 °F (36.3 °C) 62 16 122/64 95 %   19 0025 98.2 °F (36.8 °C) 61 20 150/69 97 %   19 2047 97.4 °F (36.3 °C) 60 20 121/57 95 %   19 1519 98 °F (36.7 °C) 62 17 116/58 96 %   19 1455 -- 64 -- 97/54 --   19 1452 -- 62 -- (!) 86/50 --   19 1449 -- 62 -- (!) 88/53 --   19 1436 -- 60 -- 114/54 96 %   19 1151 98.1 °F (36.7 °C) 62 18 130/46 98 %       Intake/Output Summary (Last 24 hours) at 2019 1143  Last data filed at 5/14/2019 0536  Gross per 24 hour   Intake --   Output 850 ml   Net -850 ml        PHYSICAL EXAM:  General: WD, WN. Alert, cooperative, no acute distress    EENT:  EOMI. Anicteric sclerae. MMM  Resp:  CTA bilaterally, no wheezing or rales. No accessory muscle use  CV:  Regular  rhythm,  No edema  GI:  Soft, Non distended, Non tender.  +Bowel sounds  Neurologic:  Alert and oriented X 3, normal speech,   Psych:   Good insight. Not anxious nor agitated  Skin:  No rashes. No jaundice    Reviewed most current lab test results and cultures  YES  Reviewed most current radiology test results   YES  Review and summation of old records today    NO  Reviewed patient's current orders and MAR    YES  PMH/ reviewed - no change compared to H&P  ________________________________________________________________________  Care Plan discussed with:    Comments   Patient     Family      RN     Care Manager     Consultant                        Multidiciplinary team rounds were held today with , nursing, pharmacist and clinical coordinator. Patient's plan of care was discussed; medications were reviewed and discharge planning was addressed. ________________________________________________________________________  Total NON critical care TIME:  30   Minutes    Total CRITICAL CARE TIME Spent:   Minutes non procedure based      Comments   >50% of visit spent in counseling and coordination of care     ________________________________________________________________________  Deliarodney Lincoln, DO     Procedures: see electronic medical records for all procedures/Xrays and details which were not copied into this note but were reviewed prior to creation of Plan. LABS:  I reviewed today's most current labs and imaging studies.   Pertinent labs include:  Recent Labs     05/14/19  0511 05/13/19  0608 05/12/19  0605   WBC 7.5 7.7 8.9   HGB 9.3* 10.1* 10.6*   HCT 29.9* 31.5* 32.4*   PLT 96* 108* 95* Recent Labs     05/14/19  0511 05/13/19  0608 05/12/19  0605    144 142   K 3.9 4.1 3.9   * 113* 109*   CO2 24 22 23   GLU 99 96 106*   BUN 29* 31* 29*   CREA 1.05 1.31* 1.17   CA 7.8* 7.7* 8.0*   MG 2.2 2.1  --    ALB 2.2* 2.5* 2.9*   TBILI 0.6 0.7 1.1*   SGOT 38* 32 25   ALT 9* 12 15       Signed: Gabrielle Shields, DO

## 2019-05-14 NOTE — PROGRESS NOTES
Problem: Dysphagia (Adult)  Goal: *Acute Goals and Plan of Care (Insert Text)  Description  5/14/2019  Speech path   1. Pt will participate with reeval of swallowing    Outcome: Not Met   SPEECH LANGUAGE PATHOLOGY BEDSIDE SWALLOW EVALUATION  Patient: Qsaim Saravia (89 y.o. male)  Date: 5/14/2019  Primary Diagnosis: Closed left femoral fracture ([address] [S72.92XA]  Procedure(s) (LRB):  LEFT HIP HEMIARTHROPLASTY (Left) 2 Days Post-Op   Precautions:        ASSESSMENT :  Based on the objective data described below, the patient presents with mild oral and mod pharyngeal dysphagia with RR of 30-35. When respirations are that high it makes swallowing safely difficult. He took several sips of thins and coughed after a delay. His hyolaryngeal excursion was reduced and a swallow delay is suspected. Appeared to tolerate purees with slow oral phase. Cleared for meds in applesauce. Nsg had asked for consult due to choking on thins. Patient will benefit from skilled intervention to address the above impairments. Patient?s rehabilitation potential is considered to be Guarded  Factors which may influence rehabilitation potential include:   ? None noted  ? Mental ability/status  ? Medical condition  ? Home/family situation and support systems  ? Safety awareness  ? Pain tolerance/management  ? Other:      PLAN :  Recommendations and Planned Interventions:  NPO except for meds  Frequency/Duration: Patient will be followed by speech-language pathology 4 times a week to address goals. Discharge Recommendations: To Be Determined     SUBJECTIVE:   Patient stated the nurse already had her chance to get his blood and did not want to be stuck again but he was cooperative.      OBJECTIVE:     Past Medical History:   Diagnosis Date    Anemia     Anxiety     Arrhythmia     Afib    Chronic kidney disease     Constipation     Depression     Hypercholesterolemia Hypertension     NSTEMI (non-ST elevated myocardial infarction) ([facility] [address] 11/21/2016    [facility]:  JACKELIN to SVG-L-PLB and JACKELIN to SVG-LADstents 11/16    Pacemaker 2009    Followed By Dr Anna Marie Ny    S/P CABG x 4 1995    Thyroid disease     hypothyroidism     Past Surgical History:   Procedure Laterality Date    CARDIAC SURG PROCEDURE UNLIST      pacer    CARDIAC SURG PROCEDURE UNLIST  07/20/1995    quadruple bypass     COLONOSCOPY N/A 11/6/2018    COLONOSCOPY performed by Lissa Bal MD at [facility] ENDOSCOPY    HX CORONARY ARTERY BYPASS GRAFT  1995    4V    HX PACEMAKER  04/23/2003     Prior Level of Function/Home Situation:   Home Situation  Home Environment: [address] [place] [address] Road Name: Yaquelin Iverson  # Steps to Enter: 0  One/Two Story Residence: One story  Living Alone: No  Support Systems: Assisted living  Patient Expects to be Discharged to[de-identified] Rehabilitation facility  Current DME Used/Available at Home: Grab bars, Cane, straight, Stewart Drones, rollator  Diet prior to admission:   Current Diet:  reg/thins   Cognitive and Communication Status:  Neurologic State: Alert, Confused  Orientation Level: Oriented to person(did not assess but he is very confused)  Cognition: Decreased command following  Perception: Appears intact  Perseveration: No perseveration noted  Safety/Judgement: Fall prevention  Oral Assessment:  Oral Assessment  Labial: No impairment  Dentition: Edentulous; Upper & lower dentures  Lingual: Other (comment)  Velum: Other (comment)  Mandible: No impairment  P.O. Trials:  Patient Position: upright in bed  Vocal quality prior to P.O.: No impairment  Consistency Presented: Thin liquid;Puree; Ice chips  How Presented: Self-fed/presented;Straw     Bolus Acceptance: No impairment  Bolus Formation/Control: Impaired  Type of Impairment: Delayed  Propulsion: Delayed (# of seconds)  Oral Residue: None  Initiation of Swallow: Delayed (# of seconds)  Laryngeal Elevation: Decreased  Aspiration Signs/Symptoms: Delayed cough/throat clear(after several sips of thins)  Pharyngeal Phase Characteristics: Easily fatigued (suspect poor laryngeal closure related to RR)             Oral Phase Severity: Mild  Pharyngeal Phase Severity : Moderate    NOMS:   The NOMS functional outcome measure was used to quantify this patient's level of swallowing impairment. Based on the NOMS, the patient was determined to be at level 2 for swallow function       NOMS Swallowing Levels:  Level 1 (CN): NPO  Level 2 (CM): NPO but takes consistency in therapy  Level 3 (CL): Takes less than 50% of nutrition p.o. and continues with nonoral feedings; and/or safe with mod cues; and/or max diet restriction  Level 4 (CK): Safe swallow but needs mod cues; and/or mod diet restriction; and/or still requires some nonoral feeding/supplements  Level 5 (CJ): Safe swallow with min diet restriction; and/or needs min cues  Level 6 (CI): Independent with p.o.; rare cues; usually self cues; may need to avoid some foods or needs extra time  Level 7 ([address] [place] [address]): Independent for all p.o.  GREA. (2003). National Outcomes Measurement System (NOMS): Adult Speech-Language Pathology User's Guide. Pain:  Pain Scale 1: Numeric (0 - 10)  Pain Intensity 1: 0     After treatment:   ?            Patient left in no apparent distress sitting up in chair  ? Patient left in no apparent distress in bed  ? Call bell left within reach  ? Nursing notified  ? Caregiver present  ? Bed alarm activated    COMMUNICATION/EDUCATION:   The patient?s plan of care including recommendations, planned interventions, and recommended diet changes were discussed with: Registered Nurse. Patient was educated regarding His deficit(s) of dysphagia as this relates to His diagnosis of dementia and oropharyngeal dysphagia. He demonstrated Guarded understanding as evidenced by confusion. ?             Posted safety precautions in patient's room.  ? Patient/family have participated as able in goal setting and plan of care. ?            Patient/family agree to work toward stated goals and plan of care. ?            Patient understands intent and goals of therapy, but is neutral about his/her participation. ? Patient is unable to participate in goal setting and plan of care.     Thank you for this referral.  Zaynab Lott, SLP  Time Calculation: 15 mins

## 2019-05-14 NOTE — PROGRESS NOTES
Ortho / Neurosurgery NP Note    POD# 2  s/p LEFT HIP HEMIARTHROPLASTY     Pt resting in bed. No complaints. VSS Afebrile. Voiding status: azar in place draining clear yellow urine     Labs  Lab Results   Component Value Date/Time    HGB 9.3 (L) 05/14/2019 05:11 AM      Lab Results   Component Value Date/Time    INR 1.3 (H) 05/09/2019 09:00 PM        Body mass index is 23.74 kg/m². : A BMI > 30 is classified as obesity and > 40 is classified as morbid obesity. Dressing c.d.i  Cryotherapy in place over incision  Calves soft and supple;  No pain with passive stretch  Sensation and motor intact  SCDs for mechanical DVT proph while in bed     PLAN:  1) PT BID  2)Home Eliquis restarted   3) GI Prophylaxis - Protonix  4) Readniess for discharge:     [x] Vital Signs stable    [x] Hgb stable    [] + Voiding - has azar catheter plan to remove- f/u voiding    [x] Wound intact, drainage minimal    [x] Tolerating PO intake     [] Cleared by PT (OT if applicable)     [] Stair training completed (if applicable)    [] Independent / Contact Guard Assist (household distance)     [] Bed mobility     [] Car transfers     [] ADLs    [x] Adequate pain control on oral medication alone    Plan to discharge to SNF pending progression per primary team       Juana Cheng NP  DNP, AGACNP-BC

## 2019-05-14 NOTE — DISCHARGE SUMMARY
Hospitalist Discharge Summary     Patient ID:  Sophia Douglas  [id number]  64 y.o.  4/24/1923 5/11/2019    PCP on record: Jose Nava NP    Admit date: 5/11/2019  Discharge date and time: 5/14/2019    DISCHARGE DIAGNOSIS:    Closed Fracture of left Femur  GLF 05/09  H/o Afib on Long term Anticoagulation  H/o NSTEMI   CAD S/p CABGx4  Hyperlipidemia  Hypothyroidism  Dementia    CONSULTATIONS:  IP CONSULT TO ANESTHESIOLOGY  IP CONSULT TO ORTHOPEDIC SURGERY  IP CONSULT TO ORTHOPEDIC SURGERY  IP CONSULT TO CARDIOLOGY    Excerpted HPI from H&P of Wilmer Bruno MD:  Daisy Mercado is a 80 y.o.  male who was transferred from [facility]. Pt is a resident of assisted living facility, he had a mechanical fall on 9 may, and then found to have Closed left femoral Fracture. He was admitted for [facility] from May 9- May 11. Plan was to do surgery today at [facility], but Anesthesia had reservations about managing the patient at [facility] without ICU care give Cardiac History. So he was transferred to here. Currently he does not have complain, he just received morphine before transfer.           ______________________________________________________________________  DISCHARGE SUMMARY/HOSPITAL COURSE:  for full details see H&P, daily progress notes, labs, consult notes. The pt is sp successful hemiarthroplasty on 5/12 and has been working with physical therapy in house. The pt did not suffer any complications. He is awaiting rehab placement. He was placed on eliquis on 5/13.2019 with relatively stable hh.        _______________________________________________________________________  Patient seen and examined by me on discharge day. Pertinent Findings:  Gen:    Not in distress  Chest: Clear lungs  CVS:   Regular rhythm.   No edema  Abd:  Soft, not distended, not tender  Neuro:  Alert, nad  _______________________________________________________________________  DISCHARGE MEDICATIONS:   Current Discharge Medication List      CONTINUE these medications which have NOT CHANGED    Details   apixaban (ELIQUIS) 2.5 mg tablet Take 2.5 mg by mouth every twelve (12) hours. lactobacillus rhamnosus gg 15 billion cell (CULTURELLE) 15 billion cell capsule Take 1 Cap by mouth daily. pantoprazole (PROTONIX) 40 mg tablet Take 1 Tab by mouth Daily (before breakfast). Qty: 40 Tab, Refills: 0      Ferrous Fumarate 325 mg (106 mg iron) tab Take  by mouth two (2) times a day. escitalopram oxalate (LEXAPRO) 5 mg tablet Take 1 Tab by mouth daily. Qty: 30 Tab, Refills: 2    Associated Diagnoses: Seasonal affective disorder ([address] Mild single current episode of major depressive disorder (HCC)      loratadine (CLARITIN) 10 mg tablet Take 1 Tab by mouth daily. Indications: ALLERGIC RHINITIS  Qty: 30 Tab, Refills: 5    Associated Diagnoses: Seasonal allergic rhinitis due to pollen      atorvastatin (LIPITOR) 80 mg tablet Take 1 Tab by mouth daily. Qty: 30 Tab, Refills: 11      finasteride (PROSCAR) 5 mg tablet Take 1 Tab by mouth daily. Indications: SYMPTOMATIC BENIGN PROSTATIC HYPERPLASIA  Qty: 30 Tab, Refills: 5    Associated Diagnoses: Benign non-nodular prostatic hyperplasia, presence of lower urinary tract symptoms unspecified      multivitamin (ONE A DAY) tablet Take 1 Tab by mouth daily. acetaminophen (TYLENOL) 325 mg tablet Take 650 mg by mouth every four (4) hours as needed for Pain. Take two tabs po bid.      levothyroxine (SYNTHROID) 75 mcg tablet Take  by mouth Daily (before breakfast). OTHER as needed. Natural Laxative 30cc po every day as needed      alum-mag hydroxide-simeth (MAALOX ADVANCED) 200-200-20 mg/5 mL susp Take 30 mL by mouth every four (4) hours as needed. loperamide (IMODIUM) 2 mg capsule Take  by mouth. acetaminophen (TYLENOL) 650 mg suppository Insert 650 mg into rectum every four (4) hours as needed for Fever.       bisacodyl (DULCOLAX, BISACODYL,) 10 mg suppository Insert 10 mg into rectum as needed. neomycin-bacitracin-polymyxin (TRIPLE ANTIBIOTIC) 3.5mg-400 unit- 5,000 unit/gram ointment Apply 1 Each to affected area as needed (to wound tear/abrasion). nitroglycerin (NITROSTAT) 0.4 mg SL tablet by SubLINGual route every five (5) minutes as needed for Chest Pain (not to exceed 3 doses). albuterol (PROVENTIL VENTOLIN) 2.5 mg /3 mL (0.083 %) nebulizer solution 2.5 mg by Nebulization route as needed for Wheezing. Every 6 hrs prn SOB. Protein Supplement liqd Take 1 CUP by mouth two (2) times a day. Qty: 60 Bottle, Refills: 5    Associated Diagnoses: Hypoproteinemia (HCC)      cyanocobalamin (VITAMIN B12) 1,000 mcg/mL injection 1,000 mcg by IntraMUSCular route. Every 3 weeks      mineral oil (FLEET) enema Insert  into rectum as needed for Constipation (daily as needed). magnesium hydroxide (CLEMENS MILK OF MAGNESIA) 400 mg/5 mL suspension Take 30 mL by mouth daily as needed for Constipation. Patient Follow Up Instructions: Activity: Activity as tolerated  Diet: Cardiac Diet  Wound Care: Keep wound clean and dry    Follow-up with ortho in 1 week.   Follow-up tests/labs none  Follow-up Information     Follow up With Specialties Details Why Contact Info    Jasmin Kilpatirck MD Orthopedic Surgery Schedule an appointment as soon as possible for a visit in 2 weeks  [address] [facility] [address]  [phone]          ________________________________________________________________    Risk of deterioration: Moderate    Condition at Discharge:  Stable  __________________________________________________________________    Disposition  IP Rehab    ____________________________________________________________________    Code Status: Full Code  ___________________________________________________________________      Total time in minutes spent coordinating this discharge (includes going over instructions, follow-up, prescriptions, and preparing report for sign off to her PCP) :  30 minutes    Signed:  Clint Jones DO

## 2019-05-14 NOTE — PROGRESS NOTES
Bedside shift change report given to Daisy Bryant RN (oncoming nurse) by Imtiaz Young RN (offgoing nurse). Report included the following information SBAR, Kardex, Intake/Output, MAR and Recent Results.

## 2019-05-15 ENCOUNTER — APPOINTMENT (OUTPATIENT)
Dept: GENERAL RADIOLOGY | Age: 84
DRG: 470 | End: 2019-05-15
Attending: NEUROMUSCULOSKELETAL MEDICINE & OMM
Payer: MEDICARE

## 2019-05-15 LAB
CORTIS AM PEAK SERPL-MCNC: 11.4 UG/DL (ref 4.3–22.45)
ERYTHROCYTE [DISTWIDTH] IN BLOOD BY AUTOMATED COUNT: 14.2 % (ref 11.5–14.5)
HCT VFR BLD AUTO: 26 % (ref 36.6–50.3)
HGB BLD-MCNC: 8.5 G/DL (ref 12.1–17)
MCH RBC QN AUTO: 31 PG (ref 26–34)
MCHC RBC AUTO-ENTMCNC: 32.7 G/DL (ref 30–36.5)
MCV RBC AUTO: 94.9 FL (ref 80–99)
NRBC # BLD: 0 K/UL (ref 0–0.01)
NRBC BLD-RTO: 0 PER 100 WBC
PLATELET # BLD AUTO: 98 K/UL (ref 150–400)
PMV BLD AUTO: 10.7 FL (ref 8.9–12.9)
RBC # BLD AUTO: 2.74 M/UL (ref 4.1–5.7)
TSH SERPL DL<=0.05 MIU/L-ACNC: 2.35 UIU/ML (ref 0.36–3.74)
WBC # BLD AUTO: 7.8 K/UL (ref 4.1–11.1)

## 2019-05-15 PROCEDURE — 77030037877 HC DRSG MEPILEX >48IN BORD MOLN -A

## 2019-05-15 PROCEDURE — 74011250636 HC RX REV CODE- 250/636: Performed by: NEUROMUSCULOSKELETAL MEDICINE & OMM

## 2019-05-15 PROCEDURE — 74230 X-RAY XM SWLNG FUNCJ C+: CPT

## 2019-05-15 PROCEDURE — 92611 MOTION FLUOROSCOPY/SWALLOW: CPT

## 2019-05-15 PROCEDURE — 97530 THERAPEUTIC ACTIVITIES: CPT | Performed by: PHYSICAL THERAPIST

## 2019-05-15 PROCEDURE — 74011250637 HC RX REV CODE- 250/637: Performed by: NURSE PRACTITIONER

## 2019-05-15 PROCEDURE — 92526 ORAL FUNCTION THERAPY: CPT

## 2019-05-15 PROCEDURE — 85027 COMPLETE CBC AUTOMATED: CPT

## 2019-05-15 PROCEDURE — 65660000000 HC RM CCU STEPDOWN

## 2019-05-15 PROCEDURE — 94761 N-INVAS EAR/PLS OXIMETRY MLT: CPT

## 2019-05-15 PROCEDURE — 82533 TOTAL CORTISOL: CPT

## 2019-05-15 PROCEDURE — 36415 COLL VENOUS BLD VENIPUNCTURE: CPT

## 2019-05-15 PROCEDURE — 74011250637 HC RX REV CODE- 250/637: Performed by: ORTHOPAEDIC SURGERY

## 2019-05-15 PROCEDURE — 94762 N-INVAS EAR/PLS OXIMTRY CONT: CPT

## 2019-05-15 PROCEDURE — 97110 THERAPEUTIC EXERCISES: CPT | Performed by: PHYSICAL THERAPIST

## 2019-05-15 PROCEDURE — 84443 ASSAY THYROID STIM HORMONE: CPT

## 2019-05-15 PROCEDURE — 77030032490 HC SLV COMPR SCD KNE COVD -B

## 2019-05-15 RX ADMIN — ATORVASTATIN CALCIUM 80 MG: 40 TABLET, FILM COATED ORAL at 08:40

## 2019-05-15 RX ADMIN — LORATADINE 10 MG: 10 TABLET ORAL at 08:40

## 2019-05-15 RX ADMIN — ACETAMINOPHEN 1000 MG: 500 TABLET ORAL at 00:17

## 2019-05-15 RX ADMIN — DOCUSATE SODIUM 100 MG: 100 CAPSULE, LIQUID FILLED ORAL at 08:40

## 2019-05-15 RX ADMIN — ACETAMINOPHEN 1000 MG: 500 TABLET ORAL at 17:06

## 2019-05-15 RX ADMIN — PANTOPRAZOLE SODIUM 40 MG: 40 TABLET, DELAYED RELEASE ORAL at 08:40

## 2019-05-15 RX ADMIN — Medication 10 ML: at 06:06

## 2019-05-15 RX ADMIN — LEVOTHYROXINE SODIUM 75 MCG: 75 TABLET ORAL at 06:05

## 2019-05-15 RX ADMIN — CALCIUM CARBONATE-VITAMIN D TAB 500 MG-200 UNIT 1 TABLET: 500-200 TAB at 08:40

## 2019-05-15 RX ADMIN — APIXABAN 2.5 MG: 2.5 TABLET, FILM COATED ORAL at 08:40

## 2019-05-15 RX ADMIN — CALCIUM CARBONATE-VITAMIN D TAB 500 MG-200 UNIT 1 TABLET: 500-200 TAB at 11:57

## 2019-05-15 RX ADMIN — Medication 1 CAPSULE: at 08:40

## 2019-05-15 RX ADMIN — ACETAMINOPHEN 1000 MG: 500 TABLET ORAL at 06:06

## 2019-05-15 RX ADMIN — EPOETIN ALFA-EPBX 14000 UNITS: 10000 INJECTION, SOLUTION INTRAVENOUS; SUBCUTANEOUS at 17:05

## 2019-05-15 RX ADMIN — MULTIPLE VITAMINS W/ MINERALS TAB 1 TABLET: TAB at 08:40

## 2019-05-15 RX ADMIN — FERROUS SULFATE TAB 325 MG (65 MG ELEMENTAL FE) 325 MG: 325 (65 FE) TAB at 08:40

## 2019-05-15 RX ADMIN — FINASTERIDE 5 MG: 5 TABLET, FILM COATED ORAL at 08:40

## 2019-05-15 RX ADMIN — APIXABAN 2.5 MG: 2.5 TABLET, FILM COATED ORAL at 21:38

## 2019-05-15 RX ADMIN — Medication 10 ML: at 16:28

## 2019-05-15 RX ADMIN — SENNOSIDES AND DOCUSATE SODIUM 1 TABLET: 8.6; 5 TABLET ORAL at 08:40

## 2019-05-15 RX ADMIN — ESCITALOPRAM OXALATE 5 MG: 10 TABLET ORAL at 08:40

## 2019-05-15 RX ADMIN — CALCIUM CARBONATE-VITAMIN D TAB 500 MG-200 UNIT 1 TABLET: 500-200 TAB at 17:06

## 2019-05-15 NOTE — PROGRESS NOTES
Speech pathology  MBS completed. Patient with delayed swallow initiation. Trace, deep penetration to the cords with straw use with thin but no penetration/aspiration with thin liquids during MBSS at least. Suspect PO tolerance will be highly variable. Recommend dysphagia 2/ thin - no straws and strict upright positioning. meds 1 at a time in applesauce. Full note to follow.    Rolando Fitzpatrick M.S. CCC-SLP

## 2019-05-15 NOTE — PROGRESS NOTES
Occupational Therapy Note:    Patient off floor MBS at this time. Will defer therapy and continue to follow. Thank you.     Earline Ayala, OTR/L

## 2019-05-15 NOTE — PROGRESS NOTES
Ortho / Neurosurgery NP Note    POD# 3  s/p LEFT HIP HEMIARTHROPLASTY     Pt resting in bed. No complaints. Per primary team, did not discharge this morning r/t needing speech re-eval.     VSS Afebrile. Voiding status: Voiding  Labs  Lab Results   Component Value Date/Time    HGB 8.5 (L) 05/15/2019 04:33 AM      Lab Results   Component Value Date/Time    INR 1.3 (H) 05/09/2019 09:00 PM        Body mass index is 23.43 kg/m². : A BMI > 30 is classified as obesity and > 40 is classified as morbid obesity. Dressing c.d.i  Cryotherapy in place over incision  Calves soft and supple;  No pain with passive stretch  Sensation and motor intact  SCDs for mechanical DVT proph while in bed     PLAN:  1) PT BID  2)Home Eliquis restarted   3) GI Prophylaxis - Protonix  4) Readniess for discharge:     [x] Vital Signs stable    [x] Hgb stable    [x] + Voiding   [x] Wound intact, drainage minimal    [x] Tolerating PO intake- speech therapy following- dysphagia improving    [] Cleared by PT (OT if applicable)     [] Stair training completed (if applicable)    [] Independent / Contact Guard Assist (household distance)     [] Bed mobility     [] Car transfers     [] ADLs    [x] Adequate pain control on oral medication alone      Plan to discharge to SNF pending progression per primary team       North Herrera, NP  DNP, AGACNP-BC

## 2019-05-15 NOTE — PROGRESS NOTES
Orthopedic End of Shift Note    Bedside shift change report given to Elsy Reyes (oncoming nurse) by Anna Sims (offgoing nurse). Report included the following information SBAR, Kardex, Intake/Output, MAR, Accordion, Recent Results, Med Rec Status and Cardiac Rhythm paced.      POD#  3    Significant issues during shift: none    Issues for Physician to address: none    Activity This Shift  (check all that apply) [] chair  [] dangle   [] bathroom  [] bedside commode [] hallway  [] bedrest   Nausea/Vomiting [] yes [x] no     Voiding Status [x] void [] Russell [] I&O Cath   Bowel Movements [] yes [x] no     Foot Pumps or SCD [x] yes [] no    Ice Pack [] yes    [] no    Incentive Spirometer [] yes [] no Volume:      Telemetry Monitoring   [x] yes [] no Rhythm: paced   Supplemental O2 [x] yes [] no Sat off O2:   89%

## 2019-05-15 NOTE — PROGRESS NOTES
Hospitalist Progress Note    NAME: Tae Colón   :  1923   MRN:  [id number]       Assessment / Plan:  Closed Fracture of left Femur: s/p left hip hemiarthroplasty, under Ortho care. Pain is controlled, due for PT/OT  GLF   H/o Afib on Long term Anticoagulation:eliquis resumed  Anemia - possible blood loss. Check orthostatics. If positive consider prbc x 1. Give one dose of epogen. H/o NSTEMI so far no chest pain  CAD S/p CABGx4  Resume home regimen  Hyperlipidemia: c/w statin  Hypothyroidism: c/w L-thyroxine  Dementia: seems stable, monitor  Surrogate Decision Maker: frederick Bui   Baseline: ambulatory with cane and walker  Lives in assisted living facility  Code status: Full  Prophylaxis: Eliquis whenever OK by Ortho  Recommended Disposition: SNF/LTC      dispo - hh trending down and orthostatic. Recheck orthostatic today. Give epogen and consider prbc if remains orthostatic. Subjective:     Chief Complaint / Reason for Physician Visit  \"lying in bed, no dizziness. \". Discussed with RN events overnight. Review of Systems:  Symptom Y/N Comments  Symptom Y/N Comments   Fever/Chills    Chest Pain     Poor Appetite    Edema     Cough    Abdominal Pain     Sputum    Joint Pain     SOB/YOUNG    Pruritis/Rash     Nausea/vomit    Tolerating PT/OT     Diarrhea    Tolerating Diet     Constipation    Other       Could NOT obtain due to:      Objective:     VITALS:   Last 24hrs VS reviewed since prior progress note.  Most recent are:  Patient Vitals for the past 24 hrs:   Temp Pulse Resp BP SpO2   05/15/19 0800 98.5 °F (36.9 °C) 60 16 137/72 96 %   05/15/19 0425 97.8 °F (36.6 °C) 60 18 131/62 96 %   19 2330 98.1 °F (36.7 °C) 60 18 137/47 96 %   19 1952 98.8 °F (37.1 °C) 66 18 112/53 93 %   19 1514 97.9 °F (36.6 °C) (!) 59 18 126/40 97 %   19 1223 -- -- -- 100/50 --   19 1219 -- -- -- (!) 82/53 --   19 1206 -- 93 --  91 % Intake/Output Summary (Last 24 hours) at 5/15/2019 1205  Last data filed at 5/15/2019 1059  Gross per 24 hour   Intake 50 ml   Output 1025 ml   Net -975 ml        PHYSICAL EXAM:  General: WD, WN. Alert, cooperative, no acute distress    EENT:  EOMI. Anicteric sclerae. MMM  Resp:  CTA bilaterally, no wheezing or rales. No accessory muscle use  CV:  Regular  rhythm,  No edema  GI:  Soft, Non distended, Non tender.  +Bowel sounds  Neurologic:  Alert and oriented X 3, normal speech,   Psych:   Good insight. Not anxious nor agitated  Skin:  No rashes. No jaundice    Reviewed most current lab test results and cultures  YES  Reviewed most current radiology test results   YES  Review and summation of old records today    NO  Reviewed patient's current orders and MAR    YES  PMH/SH reviewed - no change compared to H&P  ________________________________________________________________________  Care Plan discussed with:    Comments   Patient     Family      RN     Care Manager     Consultant                        Multidiciplinary team rounds were held today with , nursing, pharmacist and clinical coordinator. Patient's plan of care was discussed; medications were reviewed and discharge planning was addressed. ________________________________________________________________________  Total NON critical care TIME:  30   Minutes    Total CRITICAL CARE TIME Spent:   Minutes non procedure based      Comments   >50% of visit spent in counseling and coordination of care     ________________________________________________________________________  Krystina Olvera DO     Procedures: see electronic medical records for all procedures/Xrays and details which were not copied into this note but were reviewed prior to creation of Plan. LABS:  I reviewed today's most current labs and imaging studies.   Pertinent labs include:  Recent Labs     05/15/19  0433 05/14/19  1534 05/14/19  0511 05/13/19  0608   WBC 7.8 --  7.5 7.7   HGB 8.5* 9.0* 9.3* 10.1*   HCT 26.0* 28.5* 29.9* 31.5*   PLT 98*  --  96* 108*     Recent Labs     05/14/19  0511 05/13/19  0608    144   K 3.9 4.1   * 113*   CO2 24 22   GLU 99 96   BUN 29* 31*   CREA 1.05 1.31*   CA 7.8* 7.7*   MG 2.2 2.1   ALB 2.2* 2.5*   TBILI 0.6 0.7   SGOT 38* 32   ALT 9* 12       Signed: Stacy Hallman, DO

## 2019-05-15 NOTE — PROGRESS NOTES
Speech pathology  Patient tolerated soft solids and nectar thick liquids well bedside. Per the daughter, apparently [facility] have indicated that they want patient to have a MBS prior to discharge? ?  Will proceed with mbs ~1:45/2. Daughter reports patient was coughing on thins prior to admission so do not think dysphagia is new. Suspect dysphagia related to advanced age. Full note to follow. Results/recommendations to follow after mbs.    Thanks,   Lito Gilmore M.S. CCC-SLP

## 2019-05-15 NOTE — PROGRESS NOTES
Problem: Mobility Impaired (Adult and Pediatric)  Goal: *Acute Goals and Plan of Care (Insert Text)  Description  Physical Therapy Additional Goals, 5/14/2019  Physical Therapy Goals  Initiated 5/14/2019  1. Patient will transfer from bed to chair and chair to bed with moderate assistance  using the least restrictive device within 7 day(s). 2.  Patient will ambulate with moderate assistance  for 10 feet with the least restrictive device within 7 day(s). Physical Therapy Goals  Initiated 5/13/2019    1. Patient will move from supine to sit and sit to supine , scoot up and down and roll side to side in bed with moderate assistance  within 4 days. 2. Patient will perform sit to stand with moderate assistance  within 4 days. 3. Will establish appropriate goals for transfers and ambulation within 7 days. 4. Patient will perform THR home exercise program per protocol with supervision/set-up within 4 days. Outcome: Progressing Towards Goal   PHYSICAL THERAPY TREATMENT  Patient: Elo White (81 y.o. male)  Date: 5/15/2019  Diagnosis: Closed left femoral fracture ([facility] [address] [S72.92XA] <principal problem not specified>  Procedure(s) (LRB):  LEFT HIP HEMIARTHROPLASTY (Left) 3 Days Post-Op  Precautions:  posterior hip precautions; WBAT  Chart, physical therapy assessment, plan of care and goals were reviewed. ASSESSMENT: Pt presents today with decreased functional mobility, strength and endurance. While pt was able to assist more today than yesterday, he still requires max A with mobility, transfers and sidestepping. Once standing, pt become hypotensive and was returned to bed in chair position. Pt continues to be limited by orthostatic hypotension. Continue to recommend SNF following discharge. Progression toward goals:  ?      Improving appropriately and progressing toward goals  x? Improving slowly and progressing toward goals  ?       Not making progress toward goals and plan of care will be adjusted PLAN:  Patient continues to benefit from skilled intervention to address the above impairments. Continue treatment per established plan of care. Discharge Recommendations:  Skilled Nursing Facility  Further Equipment Recommendations for Discharge:  TBD by SNF     SUBJECTIVE:   Patient stated I can get out of bed.   The patient stated 1/3 hip precautions. Reviewed all 3 with patient. OBJECTIVE DATA SUMMARY:   Critical Behavior:  Neurologic State: Alert  Orientation Level: Disoriented to time, Oriented to person, Oriented to place, Oriented to situation  Cognition: Follows commands  Safety/Judgement: Awareness of environment  Functional Mobility Training:  Bed Mobility:  Rolling: Total assistance  Supine to Sit: Maximum assistance;Assist x2  Sit to Supine: Total assistance  Scooting: Maximum assistance        Transfers:  Sit to Stand: Moderate assistance;Maximum assistance;Assist x2  Stand to Sit: Maximum assistance;Assist x2                             Balance:  Sitting: Impaired  Sitting - Static: Fair (occasional); Supported sitting  Sitting - Dynamic: Not tested  Standing: Impaired  Standing - Static: Poor  Standing - Dynamic : Poor  Ambulation/Gait Training:      Patient requiring max A of 2 to side step one foot toward head of bed. Poor ability to advance B LEs but able to stand more erect today                                  Therapeutic Exercises:   SUPINE  EXERCISES   Sets   Reps   Active Active Assist   Passive Self ROM   Comments   Ankle Pumps 1 10 x?                                            ?                                           ?                                           ?                                              Quad Sets 1 10 ?                                           x?                                           ?                                           ?                                              Heel Slides 1 10 ?                                           x? ?                                           ?                                              Hip Abduction 1 10 ?                                           x?                                           ?                                           ?                                                 ?                                           ?                                           ?                                           ?                                              Glut Sets 1 5 ?                                           x?                                           ?                                           ?                                                 ?                                           ?                                           ?                                           ?                                                 ?                                           ?                                           ?                                           ?                                                                                                                        Pain:  Pain Scale 1: Numeric (0 - 10)  Pain Intensity 1: 1  Pain Location 1: Leg  Pain Orientation 1: Left  Pain Description 1: Aching     Activity Tolerance:   Vitals:    05/15/19 1124 05/15/19 1126 05/15/19 1133 05/15/19 1137   BP: (!) 77/50 (!) 84/46 131/55 135/54   BP 1 Location: Right arm Right arm Right arm Right arm   BP Patient Position: Standing Sitting;Post activity Supine; Post activity Other (comment)  Comment: Bed in chair position   Pulse: 69 68 66 61   Resp:       Temp:       SpO2:       Weight:         Please refer to the flowsheet for vital signs taken during this treatment. After treatment:   ?  Patient left in no apparent distress sitting up in chair  x? Patient left in no apparent distress in bed placed in chair positon  ? Call bell left within reach  ? Nursing notified  ?   Caregiver present  ?   Bed alarm activated    COMMUNICATION/COLLABORATION:   The patients plan of care was discussed with: Occupational Therapist and Registered Nurse    Chirag Madera, PT   Time Calculation: 57 mins

## 2019-05-15 NOTE — PROGRESS NOTES
Physical Therapy  Patient off floor MBS at this time. Will defer therapy and continue to follow.   Thank you,  Susan Baird, PT

## 2019-05-15 NOTE — PROGRESS NOTES
ORTHO POST OP PROGRESS NOTE    May 15, 2019  Admit Date: 2019  Admit Diagnosis: Closed left femoral fracture ([facility] [address] [S72.92XA]  Procedure: Procedure(s):  LEFT HIP HEMIARTHROPLASTY  Post Op day: 3 Days Post-Op    Subjective:     Lisa Mcclrue is a patient who has complaints Of mild left hip pain postop day 3 status post left hip hemiarthroplasty. States he did get up and move with therapy. .     Review of Systems: Pertinent items are noted in HPI. Objective:     PT/OT:   Distance Ambulated:           Time Ambulated (min):        Ambulation Response: Activity Response: Tolerated well  Assistive Device:              Assistive Device: Fall prevention device    Vital Signs:    Blood pressure 131/62, pulse 60, temperature 97.8 °F (36.6 °C), resp. rate 18, weight 76.2 kg (168 lb), SpO2 96 %. Temp (24hrs), Av.1 °F (36.7 °C), Min:97.8 °F (36.6 °C), Max:98.8 °F (37.1 °C)      05/15 0701 - 05/15 1900  In: -   Out: 100 [Urine:100]  1901 - 05/15 0700  In: 48 [P.O.:50]  Out: 18 [Urine:1075]    LAB:    Recent Labs     05/15/19  0433   HGB 8.5*   WBC 7.8   PLT 98*       Wound/Drain Assessment:  Drain:      Dressing:     Physical Exam:  Incision clean, dry, and intact  nvi    Assessment:      Patient Active Problem List   Diagnosis Code    Dementia F03.90    Atrial fibrillation ([facility]) I48.91    NSTEMI (non-ST elevated myocardial infarction) ([facility]) I21.4    S/P CABG x 4 Z95.1    Pacemaker Z95.0    Iron deficiency anemia D50.9    Anemia in stage 3 chronic kidney disease ([facility]) N18.3, D63.1    GI bleed K92.2    Closed fracture of neck of left femur ([facility]) S72.002A    Current use of long term anticoagulation Z79.01    Femoral neck fracture ([facility]) S72.009A    Closed left femoral fracture ([facility]) S72. 92XA       Plan:     Continue PT/OT/Rehab  Discontinue:   Consult: PT  and OT    Discharge To: SNF

## 2019-05-15 NOTE — PROGRESS NOTES
CM notified by MD that patient cannot d/c this morning due to Speech Pathology needing to re-evaluate patient again today. CM has cancelled [facility] transport via all scripts and left a voice mail on phone. CM has also notified [facility] that patient will not d/c this morning. CM to plan for d/c tomorrow morning if patient medically stable.      Linda PackerkoriLens, [address]

## 2019-05-15 NOTE — PROGRESS NOTES
Orthopedic End of Shift Note    Bedside shift change report given to [facility] (oncoming nurse) by Liban Maurice (offgoing nurse). Report included the following information SBAR, Kardex, Procedure Summary, Intake/Output, MAR and Recent Results. POD#  3    Significant issues during shift: diet advanced due to swallow study, patient is confused    Issues for Physician to address:      Activity This Shift  (check all that apply) [] chair  [] dangle   [] bathroom  [] bedside commode [] hallway  [x] bedrest   Nausea/Vomiting [] yes [x] no     Voiding Status [x] void [] Russell [] I&O Cath   Bowel Movements [x] yes [] no     Foot Pumps or SCD [x] yes [] no    Ice Pack [] yes    [] no    Incentive Spirometer [x] yes [] no Volume:      Telemetry Monitoring   [x] yes [] no Rhythm:   Supplemental O2 [] yes [x] no Sat off O2:   %

## 2019-05-15 NOTE — PROGRESS NOTES
Medicare pt has received, reviewed, and signed 2nd IM letter informing them of their right to appeal the discharge. Signed copy has been placed on pt bedside chart.               Erin Little  [phone]

## 2019-05-15 NOTE — PROGRESS NOTES
Problem: Dysphagia (Adult)  Goal: *Acute Goals and Plan of Care (Insert Text)  Description  5/14/2019  Speech path   1. Pt will participate with reeval of swallowing  Met 5/15/2019  2. Added 5/15/2019 patient will tolerate a dental soft diet/ nectar thick liquids free of s/s aspiration   3. Added 5/15/2019 patient will paritcipate in mbs for objective assessment   Outcome: Progressing Towards Goal   SPEECH LANGUAGE PATHOLOGY DYSPHAGIA TREATMENT  Patient: Abdiel Whelan (2 y.o. male)  Date: 5/15/2019  Diagnosis: Closed left femoral fracture ([address] [S72.92XA] <principal problem not specified>  Procedure(s) (LRB):  LEFT HIP HEMIARTHROPLASTY (Left) 3 Days Post-Op  Precautions:       ASSESSMENT:  Patient with significant improvement in swallow compared to yesterday. Patient continues with mild oral dysphagia characterized by mildly prolonged mastication of hard solids. Pharyngeal swallow initiation is suspected to be delayed and hyolaryngeal elevation/excursion reduced, though expected given advanced age. Coughing noted after thins which apparently is NOT NEW per daughter bedside as this was happening prior to admission. Patient appeared to tolerate nectar thick liquids well with no overt s/s aspiration. Per daughter, [facility] (where patient resides) request patient have a mbs prior to returning. Progression toward goals:  ?         Improving appropriately and progressing toward goals  ? Improving slowly and progressing toward goals  ? Not making progress toward goals and plan of care will be adjusted     PLAN:  Recommendations and Planned Interventions:  Dental soft/ nectar thick liquids  Meds 1 at a time in applesauce- do not need to crush  MBS this pm per request of NH prior to discharge    Patient continues to benefit from skilled intervention to address the above impairments. Continue treatment per established plan of care. Discharge Recommendations:   To Be Determined     SUBJECTIVE:   Patient stated ? Do you know how old I am ??    OBJECTIVE:   Cognitive and Communication Status:  Neurologic State: Alert  Orientation Level: Disoriented to time, Oriented to person, Oriented to place, Oriented to situation  Cognition: Follows commands  Perception: Appears intact  Perseveration: No perseveration noted  Safety/Judgement: Awareness of environment  Dysphagia Treatment:  Oral Assessment:  Oral Assessment  Labial: No impairment  Dentition: Upper & lower dentures  Oral Hygiene: (black tinged tongue)  Lingual: No impairment  Velum: Unable to visualize  Mandible: No impairment  P.O. Trials:  Patient Position: (up in bed)  Vocal quality prior to P.O.: No impairment  Consistency Presented: Thin liquid; Nectar thick liquid;Puree; Solid  How Presented: Cup/sip; Self-fed/presented;Spoon     Bolus Acceptance: No impairment  Bolus Formation/Control: Impaired  Type of Impairment: Delayed;Mastication  Propulsion: No impairment  Oral Residue: None  Initiation of Swallow: Delayed (# of seconds)  Laryngeal Elevation: Decreased  Aspiration Signs/Symptoms: Strong cough(with thins)  Pharyngeal Phase Characteristics: Double swallow; Suspected pharyngeal residue             Oral Phase Severity: Mild  Pharyngeal Phase Severity : Mild-moderate    Pain:  Pain Scale 1: Numeric (0 - 10)  Pain Intensity 1: 1  Pain Location 1: Leg  After treatment:   ?              Patient left in no apparent distress sitting up in chair  ? Patient left in no apparent distress in bed  ? Call bell left within reach  ? Nursing notified  ? Caregiver present  ? Bed alarm activated    COMMUNICATION/EDUCATION:     The patient?s plan of care including recommendations, planned interventions, and recommended diet changes were discussed with: Registered Nurse.     ERICK Mayfield  Time Calculation: 38 mins

## 2019-05-16 ENCOUNTER — APPOINTMENT (OUTPATIENT)
Dept: GENERAL RADIOLOGY | Age: 84
DRG: 470 | End: 2019-05-16
Attending: NEUROMUSCULOSKELETAL MEDICINE & OMM
Payer: MEDICARE

## 2019-05-16 LAB
ANION GAP SERPL CALC-SCNC: 7 MMOL/L (ref 5–15)
BUN SERPL-MCNC: 26 MG/DL (ref 6–20)
BUN/CREAT SERPL: 29 (ref 12–20)
CALCIUM SERPL-MCNC: 7.9 MG/DL (ref 8.5–10.1)
CHLORIDE SERPL-SCNC: 113 MMOL/L (ref 97–108)
CO2 SERPL-SCNC: 22 MMOL/L (ref 21–32)
CREAT SERPL-MCNC: 0.89 MG/DL (ref 0.7–1.3)
ERYTHROCYTE [DISTWIDTH] IN BLOOD BY AUTOMATED COUNT: 14.1 % (ref 11.5–14.5)
GLUCOSE SERPL-MCNC: 96 MG/DL (ref 65–100)
HCT VFR BLD AUTO: 27.2 % (ref 36.6–50.3)
HGB BLD-MCNC: 8.6 G/DL (ref 12.1–17)
MCH RBC QN AUTO: 30.7 PG (ref 26–34)
MCHC RBC AUTO-ENTMCNC: 31.6 G/DL (ref 30–36.5)
MCV RBC AUTO: 97.1 FL (ref 80–99)
NRBC # BLD: 0 K/UL (ref 0–0.01)
NRBC BLD-RTO: 0 PER 100 WBC
PLATELET # BLD AUTO: 112 K/UL (ref 150–400)
PMV BLD AUTO: 11 FL (ref 8.9–12.9)
POTASSIUM SERPL-SCNC: 3.8 MMOL/L (ref 3.5–5.1)
RBC # BLD AUTO: 2.8 M/UL (ref 4.1–5.7)
SODIUM SERPL-SCNC: 142 MMOL/L (ref 136–145)
WBC # BLD AUTO: 7.7 K/UL (ref 4.1–11.1)

## 2019-05-16 PROCEDURE — 71045 X-RAY EXAM CHEST 1 VIEW: CPT

## 2019-05-16 PROCEDURE — 65660000000 HC RM CCU STEPDOWN

## 2019-05-16 PROCEDURE — 86923 COMPATIBILITY TEST ELECTRIC: CPT

## 2019-05-16 PROCEDURE — P9040 RBC LEUKOREDUCED IRRADIATED: HCPCS

## 2019-05-16 PROCEDURE — 30233N1 TRANSFUSION OF NONAUTOLOGOUS RED BLOOD CELLS INTO PERIPHERAL VEIN, PERCUTANEOUS APPROACH: ICD-10-PCS | Performed by: NEUROMUSCULOSKELETAL MEDICINE & OMM

## 2019-05-16 PROCEDURE — 74011250637 HC RX REV CODE- 250/637: Performed by: ORTHOPAEDIC SURGERY

## 2019-05-16 PROCEDURE — 97530 THERAPEUTIC ACTIVITIES: CPT

## 2019-05-16 PROCEDURE — 74011250637 HC RX REV CODE- 250/637: Performed by: NURSE PRACTITIONER

## 2019-05-16 PROCEDURE — 36430 TRANSFUSION BLD/BLD COMPNT: CPT

## 2019-05-16 PROCEDURE — P9016 RBC LEUKOCYTES REDUCED: HCPCS

## 2019-05-16 PROCEDURE — 80048 BASIC METABOLIC PNL TOTAL CA: CPT

## 2019-05-16 PROCEDURE — 85027 COMPLETE CBC AUTOMATED: CPT

## 2019-05-16 PROCEDURE — 97535 SELF CARE MNGMENT TRAINING: CPT

## 2019-05-16 PROCEDURE — 97116 GAIT TRAINING THERAPY: CPT

## 2019-05-16 PROCEDURE — 86900 BLOOD TYPING SEROLOGIC ABO: CPT

## 2019-05-16 PROCEDURE — 36415 COLL VENOUS BLD VENIPUNCTURE: CPT

## 2019-05-16 RX ORDER — SODIUM CHLORIDE 9 MG/ML
250 INJECTION, SOLUTION INTRAVENOUS AS NEEDED
Status: DISCONTINUED | OUTPATIENT
Start: 2019-05-16 | End: 2019-05-17 | Stop reason: HOSPADM

## 2019-05-16 RX ADMIN — FINASTERIDE 5 MG: 5 TABLET, FILM COATED ORAL at 09:21

## 2019-05-16 RX ADMIN — ACETAMINOPHEN 1000 MG: 500 TABLET ORAL at 13:10

## 2019-05-16 RX ADMIN — FERROUS SULFATE TAB 325 MG (65 MG ELEMENTAL FE) 325 MG: 325 (65 FE) TAB at 09:22

## 2019-05-16 RX ADMIN — ACETAMINOPHEN 1000 MG: 500 TABLET ORAL at 08:09

## 2019-05-16 RX ADMIN — LEVOTHYROXINE SODIUM 75 MCG: 75 TABLET ORAL at 08:09

## 2019-05-16 RX ADMIN — APIXABAN 2.5 MG: 2.5 TABLET, FILM COATED ORAL at 20:21

## 2019-05-16 RX ADMIN — PANTOPRAZOLE SODIUM 40 MG: 40 TABLET, DELAYED RELEASE ORAL at 08:09

## 2019-05-16 RX ADMIN — Medication 1 CAPSULE: at 09:21

## 2019-05-16 RX ADMIN — CALCIUM CARBONATE-VITAMIN D TAB 500 MG-200 UNIT 1 TABLET: 500-200 TAB at 09:21

## 2019-05-16 RX ADMIN — Medication 10 ML: at 13:10

## 2019-05-16 RX ADMIN — APIXABAN 2.5 MG: 2.5 TABLET, FILM COATED ORAL at 09:21

## 2019-05-16 RX ADMIN — ATORVASTATIN CALCIUM 80 MG: 40 TABLET, FILM COATED ORAL at 09:21

## 2019-05-16 RX ADMIN — ESCITALOPRAM OXALATE 5 MG: 10 TABLET ORAL at 09:21

## 2019-05-16 RX ADMIN — CALCIUM CARBONATE-VITAMIN D TAB 500 MG-200 UNIT 1 TABLET: 500-200 TAB at 13:10

## 2019-05-16 RX ADMIN — ACETAMINOPHEN 1000 MG: 500 TABLET ORAL at 23:33

## 2019-05-16 RX ADMIN — LORATADINE 10 MG: 10 TABLET ORAL at 09:20

## 2019-05-16 RX ADMIN — Medication 10 ML: at 21:30

## 2019-05-16 RX ADMIN — MULTIPLE VITAMINS W/ MINERALS TAB 1 TABLET: TAB at 09:20

## 2019-05-16 NOTE — PROGRESS NOTES
Ortho / Neurosurgery NP Note    POD# 4  s/p LEFT HIP HEMIARTHROPLASTY     Pt resting in bed. No complaints. VSS Afebrile. Voiding status: Voiding  Labs  Lab Results   Component Value Date/Time    HGB 8.6 (L) 05/16/2019 04:16 AM      Lab Results   Component Value Date/Time    INR 1.3 (H) 05/09/2019 09:00 PM        Body mass index is 23.43 kg/m². : A BMI > 30 is classified as obesity and > 40 is classified as morbid obesity. Dressing c.d.i  Cryotherapy in place over incision  Calves soft and supple; No pain with passive stretch  Sensation and motor intact  SCDs for mechanical DVT proph while in bed     PLAN:  1) PT BID  2)Home Eliquis restarted   3) GI Prophylaxis - Protonix  4) Readniess for discharge:     [x] Vital Signs stable    [x] Hgb stable    [x] + Voiding   [x] Wound intact, drainage minimal    [x] Tolerating PO intake- speech therapy following- recommending dysphagia 2 diet, with meds one at a time in applesauce.     [x] Cleared by PT (OT if applicable)     [] Stair training completed (if applicable)    [] Independent / Contact Guard Assist (household distance)     [] Bed mobility     [] Car transfers     [] ADLs    [x] Adequate pain control on oral medication alone      Plan to discharge to SNF pending progression per primary team       Christine Hanson, JEM  DNP, AGACNP-BC

## 2019-05-16 NOTE — PROGRESS NOTES
Hospitalist Progress Note    NAME: Deborah Brice   :  1923   MRN:  [id number]       Assessment / Plan:  Closed Fracture of left Femur: s/p left hip hemiarthroplasty, under Ortho care. Pain is controlled, due for PT/OT  GLF   H/o Afib on Long term Anticoagulation:eliquis resumed  Anemia - received 09170 unit sepogen and will transfuse 1 unit prbc for bp of 88/44 with standing. To rehab tomorrow. H/o NSTEMI so far no chest pain  CAD S/p CABGx4  Resume home regimen  Hyperlipidemia: c/w statin  Hypothyroidism: c/w L-thyroxine  Dementia: seems stable, monitor  Surrogate Decision Maker: frederick Bui   Baseline: ambulatory with cane and walker  Lives in assisted living facility  Code status: Full  Prophylaxis: Eliquis whenever OK by Ortho  Recommended Disposition: SNF/LTC      dispo - tomorrow to rehab they will have to be carefull with orthostatic as this has been an ongoing issue for 2 years. Subjective:     Chief Complaint / Reason for Physician Visit  \"no pian. No chills. \". Discussed with RN events overnight. Review of Systems:  Symptom Y/N Comments  Symptom Y/N Comments   Fever/Chills    Chest Pain     Poor Appetite    Edema     Cough    Abdominal Pain     Sputum    Joint Pain     SOB/YOUNG    Pruritis/Rash     Nausea/vomit    Tolerating PT/OT     Diarrhea    Tolerating Diet     Constipation    Other       Could NOT obtain due to:      Objective:     VITALS:   Last 24hrs VS reviewed since prior progress note.  Most recent are:  Patient Vitals for the past 24 hrs:   Temp Pulse Resp BP SpO2   19 0827 98.1 °F (36.7 °C) 60 20 149/57 98 %   19 0320 97.8 °F (36.6 °C) 60 18 139/59 --   19 0006 97.7 °F (36.5 °C) 60 18 144/52 96 %   05/15/19 2004 98.5 °F (36.9 °C) 60 16 134/54 98 %   05/15/19 1626 98 °F (36.7 °C) 60 15 163/72 97 %   05/15/19 1137 -- 61 -- 135/54 --   05/15/19 1133 -- 66 -- 131/55 --   05/15/19 1126 -- 68 -- (!) 84/46 --   05/15/19 1124 -- 69 -- (!) 77/50 --   05/15/19 1120 -- 61 -- 109/55 98 %       Intake/Output Summary (Last 24 hours) at 5/16/2019 1115  Last data filed at 5/15/2019 2218  Gross per 24 hour   Intake 100 ml   Output --   Net 100 ml        PHYSICAL EXAM:  General: WD, WN. Alert, cooperative, no acute distress    EENT:  EOMI. Anicteric sclerae. MMM  Resp:  CTA bilaterally, no wheezing or rales. No accessory muscle use  CV:  Regular  rhythm,  No edema  GI:  Soft, Non distended, Non tender.  +Bowel sounds  Neurologic:  Alert and oriented X 3, normal speech,   Psych:   Good insight. Not anxious nor agitated  Skin:  No rashes. No jaundice    Reviewed most current lab test results and cultures  YES  Reviewed most current radiology test results   YES  Review and summation of old records today    NO  Reviewed patient's current orders and MAR    YES  PMH/SH reviewed - no change compared to H&P  ________________________________________________________________________  Care Plan discussed with:    Comments   Patient     Family      RN     Care Manager     Consultant                        Multidiciplinary team rounds were held today with , nursing, pharmacist and clinical coordinator. Patient's plan of care was discussed; medications were reviewed and discharge planning was addressed. ________________________________________________________________________  Total NON critical care TIME:  30   Minutes    Total CRITICAL CARE TIME Spent:   Minutes non procedure based      Comments   >50% of visit spent in counseling and coordination of care     ________________________________________________________________________  Milderd Palms, DO     Procedures: see electronic medical records for all procedures/Xrays and details which were not copied into this note but were reviewed prior to creation of Plan. LABS:  I reviewed today's most current labs and imaging studies.   Pertinent labs include:  Recent Labs     05/16/19  0412 05/15/19  0433 05/14/19  1534 05/14/19  0511   WBC 7.7 7.8  --  7.5   HGB 8.6* 8.5* 9.0* 9.3*   HCT 27.2* 26.0* 28.5* 29.9*   * 98*  --  96*     Recent Labs     05/16/19  0416 05/14/19  0511    144   K 3.8 3.9   * 114*   CO2 22 24   GLU 96 99   BUN 26* 29*   CREA 0.89 1.05   CA 7.9* 7.8*   MG  --  2.2   ALB  --  2.2*   TBILI  --  0.6   SGOT  --  38*   ALT  --  9*       Signed: Clint Jones, DO

## 2019-05-16 NOTE — PROGRESS NOTES
Problem: Mobility Impaired (Adult and Pediatric)  Goal: *Acute Goals and Plan of Care (Insert Text)  Description  Physical Therapy Additional Goals, 5/14/2019  Physical Therapy Goals  Initiated 5/14/2019  1. Patient will transfer from bed to chair and chair to bed with moderate assistance  using the least restrictive device within 7 day(s). 2.  Patient will ambulate with moderate assistance  for 10 feet with the least restrictive device within 7 day(s). Physical Therapy Goals  Initiated 5/13/2019    1. Patient will move from supine to sit and sit to supine , scoot up and down and roll side to side in bed with moderate assistance  within 4 days. 2. Patient will perform sit to stand with moderate assistance  within 4 days. 3. Will establish appropriate goals for transfers and ambulation within 7 days. 4. Patient will perform THR home exercise program per protocol with supervision/set-up within 4 days. Note:   PHYSICAL THERAPY TREATMENT  Patient: Cristela Garcia (48 y.o. male)  Date: 5/16/2019  Diagnosis: Closed left femoral fracture ([address] [S72.92XA]     Procedure(s) (LRB):  LEFT HIP HEMIARTHROPLASTY (Left) 4 Days Post-Op    Precautions:  falls  Chart, physical therapy assessment, plan of care and goals were reviewed. ASSESSMENT: pt tolerated tx very well this am, no LOB or SOB, max vc's to keep feet apart, did well with transfers and fair with bed mob, vc's for safety and proper RW use. Progression toward goals:  ?    Improving appropriately and progressing toward goals  ? Improving very slowly and progressing toward goals  ? Not making progress toward goals and plan of care will be adjusted     PLAN:  Patient continues to benefit from skilled intervention to address the above impairments. Continue treatment per established plan of care.   Discharge Recommendations:  Basil Tariq  Further Equipment Recommendations for Discharge:  rolling walker     OBJECTIVE DATA SUMMARY: Critical Behavior:  Neurologic State: Alert, Confused  Orientation Level: Disoriented to person  Cognition: Decreased attention/concentration, Follows commands, Poor safety awareness  Safety/Judgement: Awareness of environment    Functional Mobility Training:  Bed Mobility:  Rolling: Total assistance  Sit to Supine: Maximum assistance;Assist x2  Scooting: Maximum assistance;Assist x1;Additional time  Level of Assistance: Maximum assistance  Interventions: Tactile cues; Verbal cues    Transfers:  Sit to Stand: Minimum assistance;Assist x2; Additional time  Stand to Sit: Contact guard assistance  Bed to Chair: Minimum assistance;Assist x2; Additional time  Interventions: Tactile cues; Verbal cues  Level of Assistance: Minimum assistance    Balance:  Sitting: Intact; Without support  Sitting - Static: Good (unsupported)  Sitting - Dynamic: Not tested  Standing: Impaired; With support  Standing - Static: Fair;Constant support  Standing - Dynamic : Fair;Constant support    Ambulation/Gait Training:  Distance (ft): 12 Feet (ft)  Assistive Device: Walker, rolling;Gait belt  Ambulation - Level of Assistance: Minimal assistance;Assist x2; Additional time  Gait Abnormalities: Antalgic;Decreased step clearance; Step to gait  Right Side Weight Bearing: Full  Left Side Weight Bearing: As tolerated  Base of Support: Narrowed  Stance: Left decreased  Speed/Kristin: Slow;Shuffled  Step Length: Left shortened;Right shortened  Interventions: Tactile cues; Verbal cues    Pain:  Pain Scale 1: Numeric (0 - 10)  Pain Intensity 1: 0(no c/o pain)    Activity Tolerance: fair    After treatment:   ?    Patient left in no apparent distress sitting up in chair  ? Patient left in no apparent distress in bed  ? Call bell left within reach  ? Nursing notified  ? Caregiver present  ?     Bed alarm activated    COMMUNICATION/COLLABORATION:   The patient?s plan of care was discussed with: Registered NATALIO Sorenson Calculation: 25 mins

## 2019-05-16 NOTE — PROGRESS NOTES
Problem: Self Care Deficits Care Plan (Adult)  Goal: *Acute Goals and Plan of Care (Insert Text)  Description  Occupational Therapy Goals  Initiated 5/13/2019     1. Patient will perform lower body dressing using Reacher  Stocking Aid  Elastic Shoe Laces  Long Handled Shoe Horn at moderate assistance  level within 7 days. 2. Patient will perform toilet transfers at moderate assistance  level using Walkers, Type: Rolling Walker  within 7 days. 3. Patient will perform all aspects of toileting at minimal assistance/contact guard assist level within 7 days. 4. Patient will demonstrate 3/3 hip precautions without cues within 7 days. Outcome: Progressing Towards Goal   OCCUPATIONAL THERAPY TREATMENT  Patient: Socrates Alvarez (31 y.o. male)  Date: 5/16/2019  Diagnosis: Closed left femoral fracture ([address] [S72.92XA] <principal problem not specified>  Procedure(s) (LRB):  LEFT HIP HEMIARTHROPLASTY (Left) 4 Days Post-Op  Precautions:    Chart, occupational therapy assessment, plan of care, and goals were reviewed. ASSESSMENT:  Pt was cleared to be seen for therapy and all VSS and he was supine in bed. Pt was max of 2 for supine to sit  and sitting balance was impaired at first and then pt was SBA for balance . He was min to CGA for bathing UB sitting on EOB, with VC to stay on task. He was able to assist with donning gown with mod assist of 1 . He was not able to clean sheridan area and buttocks in standing and was max of 2 to stand and max to clean sheridan area and buttocks. Pt was max to side step to [facility] and transfer to chair with max to mod assist of 2. Pt was left sitting up in chair and his BP was stable in sitting. Nursing notified and recommend that pt have rehab at [facility] at discharge. Pt was able to follow commands better today after he was sitting up.    Sitting on EOb 136/65  Standing- 88/42  Sitting-92/57  Sitting-123/61  Sitting in chair-115/66  Sitting in chair- 136/65      Progression toward goals:  ?          Improving appropriately and progressing toward goals  ? Improving slowly and progressing toward goals  ? Not making progress toward goals and plan of care will be adjusted     PLAN:  Patient continues to benefit from skilled intervention to address the above impairments. Continue treatment per established plan of care. Discharge Recommendations:  Basil Tariq  Further Equipment Recommendations for Discharge:  tbd      SUBJECTIVE:   Patient stated ? I can try to eat now.?      OBJECTIVE DATA SUMMARY:   Cognitive/Behavioral Status:  Neurologic State: Alert, Confused  Orientation Level: Oriented to person  Cognition: Follows commands, Decreased attention/concentration, Impaired decision making, Impulsive, Poor safety awareness  Safety/Judgement: Fall prevention    Functional Mobility and Transfers for ADLs:  Bed Mobility:  Rolling: Total assistance  Sit to Supine: Maximum assistance;Assist x2  Scooting: Maximum assistance;Assist x1;Additional time    Transfers:  Sit to Stand: Minimum assistance;Assist x2; Additional time      Bed to Chair: Minimum assistance;Assist x2; Additional time    Balance:  Sitting: Intact; Without support  Sitting - Static: Good (unsupported)  Sitting - Dynamic: Not tested  Standing: Impaired; With support  Standing - Static: Fair;Constant support  Standing - Dynamic : Fair;Constant support    ADL Intervention and Instruction:  Feeding  Feeding Assistance: Set-up; Supervision    Grooming  Washing Face: Set-up  Washing Hands: Set-up  Brushing/Combing Hair: Minimum assistance;Contact guard assistance    Upper Body Bathing  Bathing Assistance: Minimum assistance;Contact guard assistance  Position Performed: Seated in chair     Pt is max for LB ADLs              Toileting  Toileting Assistance: Maximum assistance  Bladder Hygiene: Maximum assistance  Bowel Hygiene: Maximum assistance    Cognitive Retraining  Safety/Judgement: Fall prevention          Pain:  Pain Scale 1: Numeric (0 - 10)  Pain Intensity 1: 0(no c/o pain)              Activity Tolerance:   fair  Please refer to the flowsheet for vital signs taken during this treatment. After treatment:   ? Patient left in no apparent distress sitting up in chair  ? Patient left in no apparent distress in bed  ? Call bell left within reach  ? Nursing notified  ? Caregiver present  ?  Bed alarm activated    COMMUNICATION/COLLABORATION:   The patient?s plan of care was discussed with: Physical Therapist, Registered Nurse and family     Jose Armando Quesada OT  Time Calculation: 53 mins

## 2019-05-16 NOTE — PROGRESS NOTES
Nutrition Assessment:    INTERVENTIONS/RECOMMENDATIONS:   Diet per SLP  Continue ensure TID    ASSESSMENT:   Chart reviewed. Pt had MBS yesterday, diet advance to NDD2 and no straws. Intake with meals is poor however he is consuming ensure TID. He was having some difficulties with his dentures this afternoon. Pt will be discharging tomorrow morning. Diet Order: (NDD2 (no straws))  % Eaten:    Patient Vitals for the past 72 hrs:   % Diet Eaten   05/16/19 1325 15 %   05/14/19 1305 25 %     Pertinent Medications: [x]Reviewed:   Pertinent Labs: [x]Reviewed:   Food Allergies: [x]NKFA  []Other   Last BM:  5/16  Edema:      []RUE   []LUE   []RLE   []LLE      Pressure Ulcer:      [] Stage I   [] Stage II   [] Stage III   [] Stage IV      Anthropometrics: Height:   Weight: 76.2 kg (168 lb)    IBW (%IBW):   ( ) UBW (%UBW):   (  %)    BMI: Body mass index is 23.43 kg/m². This BMI is indicative of:  []Underweight   [x]Normal   []Overweight   [] Obesity   [] Extreme Obesity (BMI>40)  Last Weight Metrics:  Weight Loss Metrics 5/14/2019 5/11/2019 5/9/2019 4/18/2019 2/13/2019 11/14/2018 11/6/2018   Today's Wt 168 lb - 156 lb 156 lb 156 lb 3.2 oz 151 lb 3.2 oz 151 lb 0.2 oz   BMI - 23.43 kg/m2 21.76 kg/m2 21.76 kg/m2 21.79 kg/m2 21.09 kg/m2 -       Estimated Nutrition Needs (Based on): 1850 Kcals/day(BMR: 1425 x 1.3) , 80 g(1 g/kg) Protein  Carbohydrate:  At Least 130 g/day  Fluids: 1850 mL/day or per primary team    NUTRITION DIAGNOSES:   Problem:  Increased nutrient needs      Etiology: related to fracture healing     Signs/Symptoms: as evidenced by Closed left femoral fracture s/p LEFT HIP HEMIARTHROPLASTY    Previous Nutrition Dx:  [] Resolved  [] Unresolved           [x] Progressing    NUTRITION INTERVENTIONS:  Meals/Snacks: General/healthful diet   Supplements: Commercial supplement              GOAL:   consume >50% of meals and ONS in 3-5 days    NUTRITION MONITORING AND EVALUATION      Food/Nutrient Intake Outcomes: Total energy intake  Physical Signs/Symptoms Outcomes: Electrolyte and renal profile, Weight/weight change, GI    Previous Goal Met:   [] Met              [x] Progressing Towards Goal              [] Not Progressing Towards Goal   Previous Recommendations:   [x] Implemented          [] Not Implemented          [] Not Applicable    LEARNING NEEDS (Diet, Food/Nutrient-Drug Interaction):    [x] None Identified   [] Identified and Education Provided/Documented   [] Identified and Pt declined/was not appropriate     Cultural, Baptism, OR Ethnic Dietary Needs:    [x] None Identified   [] Identified and Addressed     [x] Interdisciplinary Care Plan Reviewed/Documented    [x] Discharge Planning: Diet per SLP, nutrition supplements as needed   [] Participated in Interdisciplinary Rounds    NUTRITION RISK:    [x] High      ro        [x] Moderate           []  Low  []  Minimal/Uncompromised      Tonya Prasad RDN  Pager [phone]  Weekend Pager [phone]
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
## 2019-05-16 NOTE — PROGRESS NOTES
Problem: Mobility Impaired (Adult and Pediatric)  Goal: *Acute Goals and Plan of Care (Insert Text)  Description  Physical Therapy Additional Goals, 5/14/2019  Physical Therapy Goals  Initiated 5/14/2019  1. Patient will transfer from bed to chair and chair to bed with moderate assistance  using the least restrictive device within 7 day(s). 2.  Patient will ambulate with moderate assistance  for 10 feet with the least restrictive device within 7 day(s). Physical Therapy Goals  Initiated 5/13/2019    1. Patient will move from supine to sit and sit to supine , scoot up and down and roll side to side in bed with moderate assistance  within 4 days. 2. Patient will perform sit to stand with moderate assistance  within 4 days. 3. Will establish appropriate goals for transfers and ambulation within 7 days. 4. Patient will perform THR home exercise program per protocol with supervision/set-up within 4 days. 5/16/2019 1634 by Neptali Payan PTA  Note:   PHYSICAL THERAPY TREATMENT  Patient: Keisha Najera (53 y.o. male)  Date: 5/16/2019  Diagnosis: Closed left femoral fracture ([address] [S72.92XA]     Procedure(s) (LRB):  LEFT HIP HEMIARTHROPLASTY (Left) 4 Days Post-Op    Precautions:  FALLS!!  Chart, physical therapy assessment, plan of care and goals were reviewed. ASSESSMENT: pt with slow progress but is doing well with most of his mobility, able to amb to bathroom and back to bed and stand for some time to be cleaned, does fair with bed mob and transfers, large fall risk, max vc's for safety and proper RW use. Progression toward goals:  ?    Improving appropriately and progressing toward goals  ? Improving slowly and progressing toward goals  ? Not making progress toward goals and plan of care will be adjusted     PLAN:  Patient continues to benefit from skilled intervention to address the above impairments. Continue treatment per established plan of care.   Discharge Recommendations:  Skilled Nursing Facility  Further Equipment Recommendations for Discharge:  rolling walker     OBJECTIVE DATA SUMMARY:     Critical Behavior:  Neurologic State: Alert, Confused  Orientation Level: Oriented to person  Cognition: Follows commands, Decreased attention/concentration, Impaired decision making, Impulsive, Poor safety awareness  Safety/Judgement: Fall prevention    Functional Mobility Training:  Bed Mobility:  Rolling: Maximum assistance;Assist x1  Supine to Sit: Maximum assistance;Assist x1  Sit to Supine: Maximum assistance;Assist x2  Scooting: Moderate assistance;Assist x1  Level of Assistance: Maximum assistance  Interventions: Manual cues; Tactile cues; Verbal cues    Transfers:  Sit to Stand: Minimum assistance;Assist x2; Additional time  Stand to Sit: Contact guard assistance  Bed to Chair: Moderate assistance;Assist x2; Additional time  Interventions: Tactile cues; Verbal cues;Manual cues  Level of Assistance: Moderate assistance    Balance:  Sitting: Intact; Without support  Sitting - Static: Good (unsupported)  Sitting - Dynamic: Not tested  Standing: Impaired; With support  Standing - Static: Fair;Constant support  Standing - Dynamic : Fair;Constant support    Ambulation/Gait Training:  Distance (ft): 20 Feet (ft)  Assistive Device: Walker, rolling;Gait belt  Ambulation - Level of Assistance: Moderate assistance;Assist x2; Additional time  Gait Abnormalities: Antalgic;Decreased step clearance; Step to gait  Right Side Weight Bearing: Full  Left Side Weight Bearing: As tolerated  Base of Support: Narrowed  Stance: Left decreased  Speed/Kristin: Slow;Shuffled  Step Length: Left shortened;Right shortened  Interventions: Tactile cues; Verbal cues    Pain:  Pain Scale 1: Numeric (0 - 10)  Pain Intensity 1: 0    Activity Tolerance: good    After treatment:   ?    Patient left in no apparent distress sitting up in chair  ? Patient left in no apparent distress in bed  ? Call bell left within reach  ?     Nursing notified  ? Caregiver present  ? Bed alarm activated    COMMUNICATION/COLLABORATION:   The patient?s plan of care was discussed with: Registered Nurse    Jj Barfield PTA   Time Calculation: 30 mins              5/16/2019 1108 by Satinder Sesay PTA  Note:   PHYSICAL THERAPY TREATMENT  Patient: Khurram Borges (18 y.o. male)  Date: 5/16/2019  Diagnosis: Closed left femoral fracture ([address] [S72.92XA]     Procedure(s) (LRB):  LEFT HIP HEMIARTHROPLASTY (Left) 4 Days Post-Op    Precautions:  falls  Chart, physical therapy assessment, plan of care and goals were reviewed. ASSESSMENT: pt tolerated tx very well this am, no LOB or SOB, max vc's to keep feet apart, did well with transfers and fair with bed mob, vc's for safety and proper RW use. Progression toward goals:  ?    Improving appropriately and progressing toward goals  ? Improving very slowly and progressing toward goals  ? Not making progress toward goals and plan of care will be adjusted     PLAN:  Patient continues to benefit from skilled intervention to address the above impairments. Continue treatment per established plan of care. Discharge Recommendations:  [facility]  Further Equipment Recommendations for Discharge:  rolling walker     OBJECTIVE DATA SUMMARY:     Critical Behavior:  Neurologic State: Alert, Confused  Orientation Level: Disoriented to person  Cognition: Decreased attention/concentration, Follows commands, Poor safety awareness  Safety/Judgement: Awareness of environment    Functional Mobility Training:  Bed Mobility:  Rolling: Total assistance  Sit to Supine: Maximum assistance;Assist x2  Scooting: Maximum assistance;Assist x1;Additional time  Level of Assistance: Maximum assistance  Interventions: Tactile cues; Verbal cues    Transfers:  Sit to Stand: Minimum assistance;Assist x2; Additional time  Stand to Sit: Contact guard assistance  Bed to Chair: Minimum assistance;Assist x2; Additional time  Interventions: Tactile cues; Verbal cues  Level of Assistance: Minimum assistance    Balance:  Sitting: Intact; Without support  Sitting - Static: Good (unsupported)  Sitting - Dynamic: Not tested  Standing: Impaired; With support  Standing - Static: Fair;Constant support  Standing - Dynamic : Fair;Constant support    Ambulation/Gait Training:  Distance (ft): 12 Feet (ft)  Assistive Device: Walker, rolling;Gait belt  Ambulation - Level of Assistance: Minimal assistance;Assist x2; Additional time  Gait Abnormalities: Antalgic;Decreased step clearance; Step to gait  Right Side Weight Bearing: Full  Left Side Weight Bearing: As tolerated  Base of Support: Narrowed  Stance: Left decreased  Speed/Kristin: Slow;Shuffled  Step Length: Left shortened;Right shortened  Interventions: Tactile cues; Verbal cues    Pain:  Pain Scale 1: Numeric (0 - 10)  Pain Intensity 1: 0(no c/o pain)    Activity Tolerance: fair    After treatment:   ?    Patient left in no apparent distress sitting up in chair  ? Patient left in no apparent distress in bed  ? Call bell left within reach  ? Nursing notified  ? Caregiver present  ?     Bed alarm activated    COMMUNICATION/COLLABORATION:   The patient?s plan of care was discussed with: Registered Nurse    Liz Zepeda PTA   Time Calculation: 25 mins

## 2019-05-16 NOTE — PROGRESS NOTES
Problem: Dysphagia (Adult)  Goal: *Acute Goals and Plan of Care (Insert Text)  Description  5/14/2019  Speech path   1. Pt will participate with reeval of swallowing  Met 5/15/2019  2. Added 5/15/2019 patient will tolerate a dental soft diet/ nectar thick liquids free of s/s aspiration Changed to NDD2/ thin (no straws)  3. Added 5/15/2019 patient will paritcipate in mbs for objective assessment  MET 5/15/2019    5/15/2019 0955 by ERICK Hall  Outcome: Progressing Towards Goal   [address] STUDY  Patient: Librado Huff (75 y.o. male)  Date: 5/15/2019  Primary Diagnosis: Closed left femoral fracture ([address] [S72.92XA]  Procedure(s) (LRB):  LEFT HIP HEMIARTHROPLASTY (Left) 3 Days Post-Op   Precautions: aspiration      ASSESSMENT :  Based on the objective data described below, the patient presents with mild-moderate oropharyngeal dysphagia. Patient with prolonged mastication of solid and delayed posterior propulsion of solid. Complete oral clearance achieved. Pharyngeal dysphagia characterized by delayed swallow initiation occurring at the pyriform sinuses with thin and nectar thick liquids. Thickened liquid did not appear to make a difference in initiation time. Patient with mildly decreased tongue base retraction, reduced anterior hyoid excursion and incomplete laryngeal closure. Patient with trace, deep penetration reaching the level of the cords with straw sip of thin. No penetration/aspiration noted with small, single cup sips of thin, cup/straw sips of nectar, puree or solid. Mild vallecular residue and trace posterior pharyngeal wall residue following solid. No pharyngeal residue after liquids. Treatment:  Lengthy discussion with daughter along with patient following MBS. Patient's biggest limitation is delayed swallow initiation. Suspect tolerance will vary from day to day and is likely age and mentation related.  MBSS shows a small snapshot in time so while no penetration/aspiration was seen on study,  this does not mean the risk is not there. We discussed diet consistencies and daughter in agreement for minced/ground. Daughter reports that he'll occasionally take ~2 hrs to consume a meal because of how long he will chew- this is not functional.     Patient will benefit from skilled intervention to address the above impairments. Patient?s rehabilitation potential is considered to be Guarded  Factors which may influence rehabilitation potential include:   ? None noted  ? Mental ability/status  ? Medical condition  ? Home/family situation and support systems  ? Safety awareness  ? Pain tolerance/management  ? Other:      PLAN :  Recommendations and Planned Interventions:  -- Dysphagia 2 (mechanically altered diet)/ thin liquids via small, single cup sip  -- strict aspiration precautions  -- meds 1 at a time in [facility]  -- trial of speech therapy to address delay and decreased laryngeal closure   Frequency/Duration: Patient will be followed by speech-language pathology 2 times a week to address goals. Discharge Recommendations: Skilled Nursing Facility     SUBJECTIVE:   Patient stated ? Can I have some ice water please??    OBJECTIVE:     Past Medical History:   Diagnosis Date    Anemia     Anxiety     Arrhythmia     Afib    Chronic kidney disease     Constipation     Depression     Hypercholesterolemia     Hypertension     [facility]STEM[facility] (non-ST elevated [facility]) [facility] 11/21/2016    [facility]:  JACKELIN to SVG-L-PLB and JACKELIN to SVG-LADstents 11/16    Pacemaker 2009    Followed By Dr Liz Wiseman    S/P CABG x 4 1995    Thyroid disease     hypothyroidism     Past Surgical History:   Procedure Laterality Date    CARDIAC SURG PROCEDURE UNLIST      pacer    CARDIAC SURG PROCEDURE UNLIST  07/20/1995    quadruple bypass     COLONOSCOPY N/A 11/6/2018    COLONOSCOPY performed by Jayme Chow MD LEW at [facility] ENDOSCOPY    HX CORONARY ARTERY BYPASS GRAFT  1995    4V    HX PACEMAKER  04/23/2003     Prior Level of Function/Home Situation: Home Situation  Home Environment: [address] [place] [address] Road Name: James Patterson  # Steps to Enter: 0  One/Two Story Residence: One story  Living Alone: No  Support Systems: Assisted living  Patient Expects to be Discharged to[de-identified] Rehabilitation facility  Current DME Used/Available at Home: Grab bars, Jalen Kussmaul, straight, Walker, rollator  Diet prior to admission: regular/thin  Current Diet:  [facility]ct[facility]   Radiologist: (Dr. Shauna Dominguez)  Film Views: Lateral;Fluoro  Patient Position: (upright in chair)    Trial 1: Trial 2:   Consistency Presented: Thin liquid Consistency Presented: Nectar thick liquid   How Presented: Self-fed/presented;Cup/sip;Straw How Presented: Self-fed/presented;Cup/sip;Straw   Consistency Amount: (4oz) Consistency Amount: (2oz)   Bolus Acceptance: No impairment Bolus Acceptance: No impairment   Bolus Formation/Control: No impairment:   Bolus Formation/Control: No impairment: Delayed   Propulsion: Delayed (# of seconds) Propulsion: Delayed (# of seconds)   Oral Residue: None Oral Residue: None   Initiation of Swallow: Triggered at pyriform sinus(es) Initiation of Swallow: Triggered at pyriform sinus(es)   Timing: Pooling 1-5 sec Timing: Pooling 1-5 sec   Penetration: Trace; To cords(with straw use only) Penetration: None   Aspiration/Timing: No evidence of aspiration Aspiration/Timing: No evidence of aspiration   Pharyngeal Clearance: No residue Pharyngeal Clearance: No residue                             Trial 3: Trial 4:   Consistency Presented: Puree Consistency Presented: Solid   How Presented: SLP-fed/presented;Spoon How Presented: Self-fed/presented   Consistency Amount: (2 tsp barium paste/applesauce mixed) Consistency Amount: (1/4 alecia cracker)   Bolus Acceptance: No impairment Bolus Acceptance: No impairment   Bolus Formation/Control: Impaired: Delayed Bolus Formation/Control: Impaired: Delayed;Mastication   Propulsion: Delayed (# of seconds) Propulsion: Delayed (# of seconds)   Oral Residue: None Oral Residue: None   Initiation of Swallow: Triggered at valleculae    Timing: Pooling 1-5 sec;Vallecular Timing: Pooling 1-5 sec;Vallecular   Penetration: None Penetration: None   Aspiration/Timing: No evidence of aspiration Aspiration/Timing: No evidence of aspiration   Pharyngeal Clearance: Vallecular residue; Less than 10% Pharyngeal Clearance: Vallecular residue;10-50%     Attempted Modifications: Double swallow                       Decreased Tongue Base Retraction?: Yes  Laryngeal Elevation: Reduced excursion with laryngeal vestibule gap; Incomplete laryngeal closure  Aspiration/Penetration Score: 5 (Penetration/Visible residue-Contrast contacts the folds, but is not ejected)   Pharyngeal Symmetry: Not assessed  Pharyngeal-Esophageal Segment: No impairment  Pharyngeal Dysfunction: Decreased tongue base retraction;Decreased elevation/closure    Oral Phase Severity: Mild-moderate  Pharyngeal Phase Severity: Mild moderate  NOMS:   The NOMS functional outcome measure was used to quantify this patient's level of swallowing impairment. Based on the NOMS, the patient was determined to be at level 4 for swallow function         [facility]:  Level 1 (CN): NPO  Level 2 (CM): NPO but takes consistency in therapy  Level 3 (CL): Takes less than 50% of nutrition p.o. and continues with nonoral feedings; and/or safe with mod cues; and/or max diet restriction  Level 4 (CK): Safe swallow but needs mod cues; and/or mod diet restriction; and/or still requires some nonoral feeding/supplements  Level 5 (CJ): Safe swallow with min diet restriction; and/or needs min cues  Level 6 (CI): Independent with p.o.; rare cues; usually self cues; may need to avoid some foods or needs extra time  Level 7 ([address] [place] [address]): Independent for all p.o.  GERA. (2003).  National Outcomes Measurement System (NOMS): Adult Speech-Language Pathology User's Guide. COMMUNICATION/EDUCATION:   Extensive discussion with daughter after MBSS    The patient?s plan of care including findings from [facility], recommendations, planned interventions, and recommended diet changes were discussed with: Registered Nurse. ? Posted safety precautions in patient's room. ? Patient/family have participated as able in goal setting and plan of care. ?  Patient/family agree to work toward stated goals and plan of care. ?  Patient understands intent and goals of therapy, but is neutral about his/her participation. ? Patient is unable to participate in goal setting and plan of care.     Thank you for this referral.  Amarjit Barajas, SLP  Time Calculation: 35 mins

## 2019-05-16 NOTE — PROGRESS NOTES
PLAN OF CARE: [facility] (healthcare side) tomorrow  1) [facility] transport has been set up for 9:00am tomorrow 5/17/19  2) CM to fax d/c summary/meds to D.O.N. ([phone])    CM spoke to MD Chanell Morel this morning who stated that patient will be ready for d/c tomorrow morning. [facility] transport has been set up for 9:00am for tomorrow 5/17/19. SARAH has notified D.O.N. Pilar Books to make her aware of d/c and time, which was approved. CM has notified patient and patient's daughter who was at bedside of d/c plan.      CALL REPORT: Barbie Recio, [place], [address]

## 2019-05-17 VITALS
BODY MASS INDEX: 23.43 KG/M2 | HEART RATE: 89 BPM | OXYGEN SATURATION: 97 % | TEMPERATURE: 98.8 F | SYSTOLIC BLOOD PRESSURE: 153 MMHG | WEIGHT: 168 LBS | DIASTOLIC BLOOD PRESSURE: 70 MMHG | RESPIRATION RATE: 20 BRPM

## 2019-05-17 LAB
ABO + RH BLD: NORMAL
BLD PROD TYP BPU: NORMAL
BLOOD GROUP ANTIBODIES SERPL: NORMAL
BPU ID: NORMAL
CROSSMATCH RESULT,%XM: NORMAL
HCT VFR BLD AUTO: 33.8 % (ref 36.6–50.3)
HGB BLD-MCNC: 10.6 G/DL (ref 12.1–17)
SPECIMEN EXP DATE BLD: NORMAL
STATUS OF UNIT,%ST: NORMAL
UNIT DIVISION, %UDIV: 0

## 2019-05-17 PROCEDURE — 36415 COLL VENOUS BLD VENIPUNCTURE: CPT

## 2019-05-17 PROCEDURE — 77010033678 HC OXYGEN DAILY

## 2019-05-17 PROCEDURE — 85018 HEMOGLOBIN: CPT

## 2019-05-17 PROCEDURE — 74011250637 HC RX REV CODE- 250/637: Performed by: ORTHOPAEDIC SURGERY

## 2019-05-17 PROCEDURE — 74011250637 HC RX REV CODE- 250/637: Performed by: NURSE PRACTITIONER

## 2019-05-17 RX ADMIN — DOCUSATE SODIUM 100 MG: 100 CAPSULE, LIQUID FILLED ORAL at 08:37

## 2019-05-17 RX ADMIN — Medication 10 ML: at 05:33

## 2019-05-17 RX ADMIN — ATORVASTATIN CALCIUM 80 MG: 40 TABLET, FILM COATED ORAL at 08:36

## 2019-05-17 RX ADMIN — ACETAMINOPHEN 1000 MG: 500 TABLET ORAL at 05:32

## 2019-05-17 RX ADMIN — POLYETHYLENE GLYCOL 3350 17 G: 17 POWDER, FOR SOLUTION ORAL at 08:35

## 2019-05-17 RX ADMIN — FERROUS SULFATE TAB 325 MG (65 MG ELEMENTAL FE) 325 MG: 325 (65 FE) TAB at 10:22

## 2019-05-17 RX ADMIN — Medication 10 ML: at 08:51

## 2019-05-17 RX ADMIN — MULTIPLE VITAMINS W/ MINERALS TAB 1 TABLET: TAB at 08:36

## 2019-05-17 RX ADMIN — FINASTERIDE 5 MG: 5 TABLET, FILM COATED ORAL at 08:36

## 2019-05-17 RX ADMIN — CALCIUM CARBONATE-VITAMIN D TAB 500 MG-200 UNIT 1 TABLET: 500-200 TAB at 08:35

## 2019-05-17 RX ADMIN — SENNOSIDES AND DOCUSATE SODIUM 1 TABLET: 8.6; 5 TABLET ORAL at 08:36

## 2019-05-17 RX ADMIN — LEVOTHYROXINE SODIUM 75 MCG: 75 TABLET ORAL at 05:32

## 2019-05-17 RX ADMIN — PANTOPRAZOLE SODIUM 40 MG: 40 TABLET, DELAYED RELEASE ORAL at 08:36

## 2019-05-17 RX ADMIN — LORATADINE 10 MG: 10 TABLET ORAL at 08:37

## 2019-05-17 RX ADMIN — Medication 1 CAPSULE: at 08:36

## 2019-05-17 RX ADMIN — APIXABAN 2.5 MG: 2.5 TABLET, FILM COATED ORAL at 08:37

## 2019-05-17 RX ADMIN — ESCITALOPRAM OXALATE 5 MG: 10 TABLET ORAL at 08:35

## 2019-05-17 NOTE — PROGRESS NOTES
[facility] to nurse Court Lennon RN at [phone] with opportunity for questions. pt going to room 151. Pt D/devorah to [facility] via ambulance,v/s stable,skin intact,dsg.dry pt. Alert X2,notified daughter of pt. Leaving.

## 2019-05-17 NOTE — DISCHARGE SUMMARY
Hospitalist Discharge Summary      Patient ID:  Bird Bernardo  [id number]  62 y.o.  4/24/1923 5/11/2019     PCP on record: Kamryn Coreas NP     Admit date: 5/11/2019  Discharge date and time: 5/17/2019     DISCHARGE DIAGNOSIS:     Closed Fracture of left Femur  GLF 05/09  H/o Afib on Long term Anticoagulation  H/o NSTEMI   CAD S/p CABGx4  Hyperlipidemia  Hypothyroidism  Dementia  anemia   Anemia due to acute blood loss  CONSULTATIONS:  IP CONSULT TO ANESTHESIOLOGY  IP CONSULT TO ORTHOPEDIC SURGERY  IP CONSULT TO ORTHOPEDIC SURGERY  IP CONSULT TO CARDIOLOGY     Excerpted HPI from H&P of Kristina Aparicio MD:  Theo Greenfield is a 80 y.o.   male who was transferred [facility]. Pt is a resident of assisted living facility, he had a mechanical fall on 9 may, and then found to have Closed left femoral Fracture. He was admitted for [facility] from May 9- May 11. Plan was to do surgery today at [facility], but Anesthesia had reservations about managing the patient at [facility] without ICU care give Cardiac History. So he was transferred to here. Currently he does not have complain, he just received morphine before transfer.              ______________________________________________________________________  DISCHARGE SUMMARY/HOSPITAL COURSE:  for full details see H&P, daily progress notes, labs, consult notes.      The pt is sp successful hemiarthroplasty on 5/12 and has been working with physical therapy in house. The pt did not suffer any complications. He is awaiting rehab placement. He was placed on eliquis on 5/13.2019. The pt's hh drifted low postoperatively with severe orthostasis. The pt received 1 unit sof prbcs post operatively and was placed on vick stocking. The daughter states the pt has had an issue with orthostasis over the last 2 years.   It was recommended to the daughter that she setup an appointment with hematology for reeval of anemia after pt leaves rehab.         _______________________________________________________________________  Patient seen and examined by me on discharge day. Pertinent Findings:  Gen:    Not in distress  Chest:  Clear lungs  CVS:    Regular rhythm. No edema  Abd:     Soft, not distended, not tender  Neuro:  Alert, nad  _______________________________________________________________________  DISCHARGE MEDICATIONS:        Current Discharge Medication List            CONTINUE these medications which have NOT CHANGED     Details   apixaban (ELIQUIS) 2.5 mg tablet Take 2.5 mg by mouth every twelve (12) hours.       lactobacillus rhamnosus gg 15 billion cell (CULTURELLE) 15 billion cell capsule Take 1 Cap by mouth daily.       pantoprazole (PROTONIX) 40 mg tablet Take 1 Tab by mouth Daily (before breakfast). Qty: 40 Tab, Refills: 0       Ferrous Fumarate 325 mg (106 mg iron) tab Take  by mouth two (2) times a day.       escitalopram oxalate (LEXAPRO) 5 mg tablet Take 1 Tab by mouth daily. Qty: 30 Tab, Refills: 2     Associated Diagnoses: Seasonal affective disorder ([address] Mild single current episode of major depressive disorder (HCC)       loratadine (CLARITIN) 10 mg tablet Take 1 Tab by mouth daily. Indications: ALLERGIC RHINITIS  Qty: 30 Tab, Refills: 5     Associated Diagnoses: Seasonal allergic rhinitis due to pollen       atorvastatin (LIPITOR) 80 mg tablet Take 1 Tab by mouth daily. Qty: 30 Tab, Refills: 11       finasteride (PROSCAR) 5 mg tablet Take 1 Tab by mouth daily. Indications: SYMPTOMATIC BENIGN PROSTATIC HYPERPLASIA  Qty: 30 Tab, Refills: 5     Associated Diagnoses: Benign non-nodular prostatic hyperplasia, presence of lower urinary tract symptoms unspecified       multivitamin (ONE A DAY) tablet Take 1 Tab by mouth daily.       acetaminophen (TYLENOL) 325 mg tablet Take 650 mg by mouth every four (4) hours as needed for Pain.  Take two tabs po bid.       levothyroxine (SYNTHROID) 75 mcg tablet Take  by mouth Daily (before breakfast).       OTHER as needed. Natural Laxative 30cc po every day as needed       alum-mag hydroxide-simeth (MAALOX ADVANCED) 200-200-20 mg/5 mL susp Take 30 mL by mouth every four (4) hours as needed.       loperamide (IMODIUM) 2 mg capsule Take  by mouth.       acetaminophen (TYLENOL) 650 mg suppository Insert 650 mg into rectum every four (4) hours as needed for Fever.       bisacodyl (DULCOLAX, BISACODYL,) 10 mg suppository Insert 10 mg into rectum as needed.       neomycin-bacitracin-polymyxin (TRIPLE ANTIBIOTIC) 3.5mg-400 unit- 5,000 unit/gram ointment Apply 1 Each to affected area as needed (to wound tear/abrasion).       nitroglycerin (NITROSTAT) 0.4 mg SL tablet by SubLINGual route every five (5) minutes as needed for Chest Pain (not to exceed 3 doses).       albuterol (PROVENTIL VENTOLIN) 2.5 mg /3 mL (0.083 %) nebulizer solution 2.5 mg by Nebulization route as needed for Wheezing. Every 6 hrs prn SOB.     Protein Supplement liqd Take 1 CUP by mouth two (2) times a day. Qty: 60 Bottle, Refills: 5     Associated Diagnoses: Hypoproteinemia (HCC)       cyanocobalamin (VITAMIN B12) 1,000 mcg/mL injection 1,000 mcg by IntraMUSCular route. Every 3 weeks       mineral oil (FLEET) enema Insert  into rectum as needed for Constipation (daily as needed).       magnesium hydroxide (CLEMENS MILK OF MAGNESIA) 400 mg/5 mL suspension Take 30 mL by mouth daily as needed for Constipation.                    Patient Follow Up Instructions: Activity: Activity as tolerated  Diet: Cardiac Diet  Wound Care: Keep wound clean and dry     Follow-up with ortho in 1 week.   Follow-up tests/labs none           Follow-up Information      Follow up With Specialties Details Why Contact Info     Nasreen Meza MD Orthopedic Surgery Schedule an appointment as soon as possible for a visit in 2 weeks   [address] [place] [address] [address]  [facility]  [phone]           ________________________________________________________________     Risk of deterioration: Moderate     Condition at Discharge:  Stable  __________________________________________________________________     Disposition  IP Rehab     ____________________________________________________________________     Code Status: Full Code  ___________________________________________________________________        Total time in minutes spent coordinating this discharge (includes going over instructions, follow-up, prescriptions, and preparing report for sign off to her PCP) :  30 minutes     Signed:  Al Pina DO

## 2019-05-17 NOTE — PROGRESS NOTES
PLAN OF CARE: Return to [address] today   1) [facility] transport 9:00am      CM spoke to MD who stated patient was cleared for d/c. CM has contacted [facility] to ensure transport was on time. Per Aniya with [facility] she stated that an ambulance is in route from the [address] [facility] neck area and will be there between 9:00am and 9:30am. CM has contacted [facility] at [address] to ensure they are ready for patient, [facility] confirmed. CM went over transition of care information below with [facility]. CM has faxed over d/c summary and MBS/speech notes following MBS study per [facility] request and sent updated notes to facility via all scripts. SARAH has contacted pt's dtr Mayra to ensure her that patient is in fact going to d/c this morning. CM has completed needs of patient at this time. Medicare pt has received, reviewed, and signed 2nd IM letter informing them of their right to appeal the discharge. Signed copy has been placed on pt bedside chart. UPDATE: 10:04AM    SARAH has contacted Aniya with [facility] requesting an ETA for transport for the 9:00 transport that has still not arrived to  patient. Siddhartha to call CM back when she finds out answer. UPDATE 10:41AM    CM received return phone call from [id number] [facility] who stated that the [facility] crew is currently on [facility] grounds and will be to patient's room any minute. UPDATE 10:50AM    [facility] has arrived to patient's room. CM has contacted nurse to make her aware. UPDATE 11:13AM     [facility] has departed with patient in route to facility. CM has contacted [facility] at facility [phone] and patient's daughter Nat [phone] to let them know that patient has left facility. Transition Note to SNF/Rehab    SNF/Rehab Transition:  Patient has been accepted to [facility] in [place] and meets criteria for admission.    Patient will transported by [facility] ambulance and expected to leave at 9: 00am.     Communication to SNF/Rehab:  Bedside RN, Kendra Willson , has been notified to update the transition plan to the facility and call report (phone number [phone]). Discharge information has been updated on the AVS.     Discharge instructions to be fax'd to facility at [facility] # [phone]). Communication to Patient/Family:  Met with patient and Allegra Hernandezon, patients daughter and they are agreeable to the transition plan. SNF/Rehab Transition:  Patient to follow-up with Home Health: N/A  PCP/Specialist: Dr. Cassy Preciado Management: N/A    Reviewed and confirmed with facility, [facility], can manage the patient care needs for the following:     Joe Pace with (X) only those applicable:    Medication:  [x]  Medications will be available at the facility  []  IV Antibiotics   []  Controlled Substance - hard copy to be sent with patient   []  Weekly Labs   Equipment:  []  CPAP/BiPAP  []  Wound Vacuum  []  Russell or Urinary Device  []  PICC/Central Line  []  Nebulizer  []  Ventilator   Treatment:  []Isolation (for MRSA, VRE, etc.)  []Surgical Drain Management  []Tracheostomy Care  []Dressing Changes  []Dialysis with transportation  []PEG Care  [x]Oxygen [facility]  []Daily Weights for Heart Failure   Dietary:  [x]Any diet limitations Dysphagia 2 and Cardiac  []Tube Feedings   []Total Parenteral Management (TPN)   Financial Resources:  []Medicaid Application - N/A  []UAI Completed- N/A  []Level II Completed-  N/A   Advanced Care Plan:  []Surrogate Decision Maker of Care  []POA  [x]Communicated Code Status Full    Other: relayed the above information to Nas at facility.        Hannah Williamson, [facility]

## 2019-05-17 NOTE — PROGRESS NOTES
Orthopedic End of Shift Note    Bedside shift change report given to FARZANA Farris RN (oncoming nurse) by Simón Espinosa RN (offgoing nurse). Report included the following information SBAR, Kardex, OR Summary, Intake/Output, MAR, Recent Results and Cardiac Rhythm Paced.      POD# 5  Significant issues during shift:   Issues for Physician to address: discharge    Activity This Shift  (check all that apply) [] chair  [] dangle   [] bathroom  [] bedside commode [] hallway  [x] bedrest   Nausea/Vomiting [] yes [x] no     Voiding Status [x] void [] Russell [] I&O Cath incontinent   Bowel Movements [x] yes [] no     Foot Pumps or SCD [x] yes [] no    Ice Pack [x] yes    [] no    Incentive Spirometer [] yes [x] no unable   Telemetry Monitoring   [x] yes [] no Rhythm:  Paced   Supplemental O2 [x] yes [] no 96

## 2019-10-11 ENCOUNTER — OFFICE VISIT (OUTPATIENT)
Dept: ONCOLOGY | Age: 84
End: 2019-10-11

## 2019-10-11 VITALS
SYSTOLIC BLOOD PRESSURE: 157 MMHG | OXYGEN SATURATION: 97 % | RESPIRATION RATE: 20 BRPM | HEART RATE: 66 BPM | HEIGHT: 71 IN | WEIGHT: 148.6 LBS | BODY MASS INDEX: 20.8 KG/M2 | DIASTOLIC BLOOD PRESSURE: 71 MMHG | TEMPERATURE: 97.6 F

## 2019-10-11 DIAGNOSIS — D50.9 IRON DEFICIENCY ANEMIA, UNSPECIFIED IRON DEFICIENCY ANEMIA TYPE: Primary | ICD-10-CM

## 2019-10-11 RX ORDER — ACETAMINOPHEN 500 MG
500 TABLET ORAL 3 TIMES DAILY
COMMUNITY

## 2019-10-11 RX ORDER — MAG HYDROX/ALUMINUM HYD/SIMETH 200-200-20
30 SUSPENSION, ORAL (FINAL DOSE FORM) ORAL
COMMUNITY

## 2019-10-11 NOTE — PROGRESS NOTES
Reason for Visit:   Azeem Bro is a 80 y.o. 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 He received PRBC transfusions in the past but has never been on Procrit. His creatinine ranges 1.2 to 1.3 and his hemoglobin have been ranging 10-11. Was admitted to the hospital on 11/1/18 with GI bleed, syncope and hypoxia. His hemoglobin was 8.0 but didn't receive blood transfusion, prior to that he was seen in the office on 10/15 when his Hb was 10.6. He underwent EGD & colonoscopy that showed gastropathy, diverticulosis throughout sigmoid, descending & transverse colon and grade I hemorrhoids. Xarelto was discontinued and he was advised to follow up with his cardiologist.  Is now on Eloquis at half dose    History of Present Illness:   Had fall and femoral fracture in May 2019. No changes otherwise. No bleeding noted. Past Medical History:   Diagnosis Date    Anemia     Anxiety     Arrhythmia     Afib    Chronic kidney disease     Constipation     Depression     Hypercholesterolemia     Hypertension     NSTEMI (non-ST elevated myocardial infarction) ([facility] [address] 11/21/2016    [facility]:  JACKELIN to SVG-L-PLB and JACKELIN to SVG-LADstents 11/16    Pacemaker 2009    Followed By Dr Cesar Mcadams S/P CABG x 4 1995    Thyroid disease     hypothyroidism      Past Surgical History:   Procedure Laterality Date    CARDIAC SURG PROCEDURE UNLIST      pacer    CARDIAC SURG PROCEDURE UNLIST  07/20/1995    quadruple bypass     COLONOSCOPY N/A 11/6/2018    COLONOSCOPY performed by Conor [facility] MD at [facility] ENDOSCOPY    HX CORONARY ARTERY BYPASS GRAFT  1995    4V    HX PACEMAKER  04/23/2003      Social History     Tobacco Use    Smoking status: Former Smoker    Smokeless tobacco: Never Used   Substance Use Topics    Alcohol use:  Yes     Alcohol/week: 7.0 standard drinks     Types: 7 Glasses of wine per week      Family History   Problem Relation Age of Onset    Glaucoma Mother     Heart Disease Father     Heart Disease Sister      Current Outpatient Medications   Medication Sig    levothyroxine (SYNTHROID) 88 mcg tablet Take 1 Tab by mouth Daily (before breakfast).  pantoprazole (PROTONIX) 40 mg tablet Take 1 Tab by mouth Daily (before breakfast).  cholestyramine-aspartame (CHOLESTYRAMINE LIGHT) 4 gram packet Take 1 Packet by mouth daily.  apixaban (ELIQUIS) 2.5 mg tablet Take 2.5 mg by mouth every twelve (12) hours.  loperamide (IMODIUM) 2 mg capsule Take  by mouth.  lactobacillus rhamnosus gg 15 billion cell (CULTURELLE) 15 billion cell capsule Take 1 Cap by mouth daily.  acetaminophen (TYLENOL) 650 mg suppository Insert 650 mg into rectum every four (4) hours as needed for Fever.  bisacodyl (DULCOLAX, BISACODYL,) 10 mg suppository Insert 10 mg into rectum as needed.  neomycin-bacitracin-polymyxin (TRIPLE ANTIBIOTIC) 3.5mg-400 unit- 5,000 unit/gram ointment Apply 1 Each to affected area as needed (to wound tear/abrasion).  Ferrous Fumarate 325 mg (106 mg iron) tab Take  by mouth two (2) times a day.  nitroglycerin (NITROSTAT) 0.4 mg SL tablet by SubLINGual route every five (5) minutes as needed for Chest Pain (not to exceed 3 doses).  escitalopram oxalate (LEXAPRO) 5 mg tablet Take 1 Tab by mouth daily.  loratadine (CLARITIN) 10 mg tablet Take 1 Tab by mouth daily. Indications: ALLERGIC RHINITIS    atorvastatin (LIPITOR) 80 mg tablet Take 1 Tab by mouth daily.  albuterol (PROVENTIL VENTOLIN) 2.5 mg /3 mL (0.083 %) nebulizer solution 2.5 mg by Nebulization route as needed for Wheezing. Every 6 hrs prn SOB.  finasteride (PROSCAR) 5 mg tablet Take 1 Tab by mouth daily. Indications: SYMPTOMATIC BENIGN PROSTATIC HYPERPLASIA    Protein Supplement liqd Take 1 CUP by mouth two (2) times a day.     cyanocobalamin (VITAMIN B12) 1,000 mcg/mL injection 1,000 mcg by IntraMUSCular route. Every 3 weeks    mineral oil (FLEET) enema Insert  into rectum as needed for Constipation (daily as needed).  multivitamin (ONE A DAY) tablet Take 1 Tab by mouth daily.  acetaminophen (TYLENOL) 325 mg tablet Take 650 mg by mouth every four (4) hours as needed for Pain. Take two tabs po bid.  OTHER as needed. Natural Laxative 30cc po every day as needed    magnesium hydroxide (CLEMENS MILK OF MAGNESIA) 400 mg/5 mL suspension Take 30 mL by mouth daily as needed for Constipation.  alum-mag hydroxide-simeth (MAALOX ADVANCED) 200-200-20 mg/5 mL susp Take 30 mL by mouth every four (4) hours as needed. No current facility-administered medications for this visit. Allergies   Allergen Reactions    Hydrocodone Nausea Only        Review of Systems: A complete review of systems was obtained, negative except as described above. Physical Exam:   There were no vitals taken for this visit. ECOG PS: 3  General: No distress  Eyes: PERRLA, anicteric sclerae  HENT: Atraumatic, OP clear  Neck: Supple  Lymphatic: No cervical, supraclavicular, or inguinal adenopathy  Respiratory: CTAB, normal respiratory effort  CV: Normal rate, regular rhythm, no murmurs, no peripheral edema  GI: Soft, nontender, nondistended, no masses, no hepatomegaly, no splenomegaly  MS: Normal gait and station. Digits without clubbing or cyanosis. Skin: No rashes, ecchymoses, or petechiae. Normal temperature, turgor, and texture. Psych: Alert, oriented, appropriate affect, normal judgment/insight    Results:     Lab Results   Component Value Date/Time    WBC 7.8 05/29/2019 02:00 PM    HGB 11.4 (L) 05/29/2019 02:00 PM    HCT 36.0 (L) 05/29/2019 02:00 PM    PLATELET 764 18/18/2242 02:00 PM    MCV 95.5 05/29/2019 02:00 PM    ABS.  NEUTROPHILS 4.9 05/29/2019 02:00 PM     Lab Results   Component Value Date/Time    Sodium 142 05/29/2019 02:00 PM    Potassium 4.8 05/29/2019 02:00 PM    Chloride 105 05/29/2019 02:00 PM    CO2 27 05/29/2019 02:00 PM    Glucose 115 (H) 05/29/2019 02:00 PM    BUN 29 (H) 05/29/2019 02:00 PM    Creatinine 1.31 (H) 05/29/2019 02:00 PM    GFR est AA >60 05/29/2019 02:00 PM    GFR est non-AA 51 (L) 05/29/2019 02:00 PM    Calcium 8.8 05/29/2019 02:00 PM     Lab Results   Component Value Date/Time    Bilirubin, total 0.4 05/29/2019 02:00 PM    ALT (SGPT) 33 05/29/2019 02:00 PM    AST (SGOT) 29 05/29/2019 02:00 PM    Alk. phosphatase 176 (H) 05/29/2019 02:00 PM    Protein, total 6.7 05/29/2019 02:00 PM    Albumin 3.1 (L) 05/29/2019 02:00 PM    Globulin 3.6 05/29/2019 02:00 PM         Assessment:   1) Anemia        Plan:   1) Right now treating as multifactoral--which seems appropirate. I don't want to check for MDS or MGUS given his overall frail state. He is very functional but the therapy for both would be too much. Would continue the iron.   Will discharge from clinic and if counts drop unexpectedly please contact me for another eval.        Signed By: Jose Danielle MD

## 2021-04-15 ENCOUNTER — TRANSCRIBE ORDER (OUTPATIENT)
Dept: REGISTRATION | Age: 86
End: 2021-04-15

## 2021-04-15 ENCOUNTER — HOSPITAL ENCOUNTER (OUTPATIENT)
Dept: LAB | Age: 86
Discharge: HOME | End: 2021-04-15
Payer: MEDICARE

## 2021-04-15 DIAGNOSIS — N39.0 URINARY TRACT INFECTION, SITE NOT SPECIFIED: Primary | ICD-10-CM

## 2021-04-15 LAB
APPEARANCE UR: ABNORMAL
BACTERIA URNS QL MICRO: ABNORMAL /HPF
BILIRUB UR QL: NEGATIVE
COLOR UR: ABNORMAL
EPITH CASTS URNS QL MICRO: ABNORMAL /LPF
GLUCOSE UR STRIP.AUTO-MCNC: NEGATIVE MG/DL
HGB UR QL STRIP: ABNORMAL
KETONES UR QL STRIP.AUTO: NEGATIVE MG/DL
LEUKOCYTE ESTERASE UR QL STRIP.AUTO: ABNORMAL
NITRITE UR QL STRIP.AUTO: NEGATIVE
PH UR STRIP: 6 [PH] (ref 5–8)
PROT UR STRIP-MCNC: ABNORMAL MG/DL
RBC #/AREA URNS HPF: ABNORMAL /HPF (ref 0–5)
SP GR UR REFRACTOMETRY: 1.02 (ref 1–1.03)
UROBILINOGEN UR QL STRIP.AUTO: 0.2 EU/DL (ref 0.2–1)
WBC URNS QL MICRO: >100 /HPF (ref 0–4)

## 2021-04-15 PROCEDURE — 87186 SC STD MICRODIL/AGAR DIL: CPT

## 2021-04-15 PROCEDURE — 87086 URINE CULTURE/COLONY COUNT: CPT

## 2021-04-15 PROCEDURE — 81001 URINALYSIS AUTO W/SCOPE: CPT

## 2021-04-15 PROCEDURE — 87077 CULTURE AEROBIC IDENTIFY: CPT

## 2021-04-18 LAB
BACTERIA SPEC CULT: ABNORMAL
CC UR VC: ABNORMAL
SERVICE CMNT-IMP: ABNORMAL

## 2021-07-05 ENCOUNTER — HOSPITAL ENCOUNTER (OUTPATIENT)
Dept: LAB | Age: 86
Discharge: HOME | End: 2021-07-05
Payer: MEDICARE

## 2021-07-05 LAB
ALBUMIN SERPL-MCNC: 3.2 G/DL (ref 3.5–5)
ALBUMIN/GLOB SERPL: 1 {RATIO} (ref 1.1–2.2)
ALP SERPL-CCNC: 93 U/L (ref 45–117)
ALT SERPL-CCNC: 17 U/L (ref 12–78)
ANION GAP SERPL CALC-SCNC: 5 MMOL/L (ref 5–15)
AST SERPL-CCNC: 27 U/L (ref 15–37)
BASOPHILS # BLD: 0 K/UL (ref 0–0.1)
BASOPHILS NFR BLD: 0 % (ref 0–1)
BILIRUB SERPL-MCNC: 0.5 MG/DL (ref 0.2–1)
BUN SERPL-MCNC: 21 MG/DL (ref 6–20)
BUN/CREAT SERPL: 18 (ref 12–20)
CALCIUM SERPL-MCNC: 9.1 MG/DL (ref 8.5–10.1)
CHLORIDE SERPL-SCNC: 108 MMOL/L (ref 97–108)
CO2 SERPL-SCNC: 33 MMOL/L (ref 21–32)
CREAT SERPL-MCNC: 1.18 MG/DL (ref 0.7–1.3)
DIFFERENTIAL METHOD BLD: ABNORMAL
EOSINOPHIL # BLD: 0.5 K/UL (ref 0–0.4)
EOSINOPHIL NFR BLD: 8 % (ref 0–7)
ERYTHROCYTE [DISTWIDTH] IN BLOOD BY AUTOMATED COUNT: 12.6 % (ref 11.5–14.5)
GLOBULIN SER CALC-MCNC: 3.1 G/DL (ref 2–4)
GLUCOSE SERPL-MCNC: 78 MG/DL (ref 65–100)
HCT VFR BLD AUTO: 35.6 % (ref 36.6–50.3)
HGB BLD-MCNC: 11.7 G/DL (ref 12.1–17)
IMM GRANULOCYTES # BLD AUTO: 0 K/UL (ref 0–0.04)
IMM GRANULOCYTES NFR BLD AUTO: 0 % (ref 0–0.5)
LYMPHOCYTES # BLD: 2 K/UL (ref 0.8–3.5)
LYMPHOCYTES NFR BLD: 30 % (ref 12–49)
MCH RBC QN AUTO: 30.8 PG (ref 26–34)
MCHC RBC AUTO-ENTMCNC: 32.9 G/DL (ref 30–36.5)
MCV RBC AUTO: 93.7 FL (ref 80–99)
MONOCYTES # BLD: 0.6 K/UL (ref 0–1)
MONOCYTES NFR BLD: 9 % (ref 5–13)
NEUTS SEG # BLD: 3.6 K/UL (ref 1.8–8)
NEUTS SEG NFR BLD: 53 % (ref 32–75)
NRBC # BLD: 0 K/UL (ref 0–0.01)
NRBC BLD-RTO: 0 PER 100 WBC
PLATELET # BLD AUTO: 147 K/UL (ref 150–400)
PMV BLD AUTO: 10.6 FL (ref 8.9–12.9)
POTASSIUM SERPL-SCNC: 4.8 MMOL/L (ref 3.5–5.1)
PROT SERPL-MCNC: 6.3 G/DL (ref 6.4–8.2)
RBC # BLD AUTO: 3.8 M/UL (ref 4.1–5.7)
SODIUM SERPL-SCNC: 146 MMOL/L (ref 136–145)
WBC # BLD AUTO: 6.8 K/UL (ref 4.1–11.1)

## 2021-07-05 PROCEDURE — 80053 COMPREHEN METABOLIC PANEL: CPT

## 2021-07-05 PROCEDURE — 85025 COMPLETE CBC W/AUTO DIFF WBC: CPT

## 2021-10-20 ENCOUNTER — HOSPITAL ENCOUNTER (OUTPATIENT)
Dept: LAB | Age: 86
Discharge: HOME | End: 2021-10-20
Payer: MEDICARE

## 2021-10-20 LAB
BASOPHILS # BLD: 0 K/UL (ref 0–0.1)
BASOPHILS NFR BLD: 1 % (ref 0–1)
DIFFERENTIAL METHOD BLD: ABNORMAL
EOSINOPHIL # BLD: 0.3 K/UL (ref 0–0.4)
EOSINOPHIL NFR BLD: 6 % (ref 0–7)
ERYTHROCYTE [DISTWIDTH] IN BLOOD BY AUTOMATED COUNT: 12.8 % (ref 11.5–14.5)
HCT VFR BLD AUTO: 33.8 % (ref 36.6–50.3)
HGB BLD-MCNC: 11 G/DL (ref 12.1–17)
IMM GRANULOCYTES # BLD AUTO: 0 K/UL (ref 0–0.04)
IMM GRANULOCYTES NFR BLD AUTO: 0 % (ref 0–0.5)
LYMPHOCYTES # BLD: 1.7 K/UL (ref 0.8–3.5)
LYMPHOCYTES NFR BLD: 32 % (ref 12–49)
MCH RBC QN AUTO: 30.4 PG (ref 26–34)
MCHC RBC AUTO-ENTMCNC: 32.5 G/DL (ref 30–36.5)
MCV RBC AUTO: 93.4 FL (ref 80–99)
MONOCYTES # BLD: 0.5 K/UL (ref 0–1)
MONOCYTES NFR BLD: 8 % (ref 5–13)
NEUTS SEG # BLD: 2.9 K/UL (ref 1.8–8)
NEUTS SEG NFR BLD: 53 % (ref 32–75)
NRBC # BLD: 0 K/UL (ref 0–0.01)
NRBC BLD-RTO: 0 PER 100 WBC
PLATELET # BLD AUTO: 136 K/UL (ref 150–400)
PMV BLD AUTO: 10.9 FL (ref 8.9–12.9)
RBC # BLD AUTO: 3.62 M/UL (ref 4.1–5.7)
WBC # BLD AUTO: 5.5 K/UL (ref 4.1–11.1)

## 2021-10-20 PROCEDURE — 85025 COMPLETE CBC W/AUTO DIFF WBC: CPT

## 2022-03-19 PROBLEM — D50.9 IRON DEFICIENCY ANEMIA: Status: ACTIVE | Noted: 2017-12-07

## 2022-03-19 PROBLEM — S72.002A CLOSED FRACTURE OF NECK OF LEFT FEMUR (HCC): Status: ACTIVE | Noted: 2019-05-09

## 2022-03-19 PROBLEM — Z79.01 CURRENT USE OF LONG TERM ANTICOAGULATION: Status: ACTIVE | Noted: 2019-05-09

## 2022-03-19 PROBLEM — K92.2 GI BLEED: Status: ACTIVE | Noted: 2018-11-02

## 2022-03-19 PROBLEM — N18.30 ANEMIA IN STAGE 3 CHRONIC KIDNEY DISEASE (HCC): Status: ACTIVE | Noted: 2017-12-07

## 2022-03-19 PROBLEM — D63.1 ANEMIA IN STAGE 3 CHRONIC KIDNEY DISEASE (HCC): Status: ACTIVE | Noted: 2017-12-07

## 2022-03-19 PROBLEM — S72.92XA CLOSED LEFT FEMORAL FRACTURE (HCC): Status: ACTIVE | Noted: 2019-05-11

## 2022-03-19 PROBLEM — S72.009A FEMORAL NECK FRACTURE (HCC): Status: ACTIVE | Noted: 2019-05-09

## 2023-05-18 RX ORDER — BISACODYL 10 MG
10 SUPPOSITORY, RECTAL RECTAL PRN
COMMUNITY

## 2023-05-18 RX ORDER — ESCITALOPRAM OXALATE 5 MG/1
5 TABLET ORAL DAILY
COMMUNITY
Start: 2017-03-01

## 2023-05-18 RX ORDER — ACETAMINOPHEN 325 MG/1
650 TABLET ORAL EVERY 4 HOURS PRN
COMMUNITY

## 2023-05-18 RX ORDER — LORATADINE 10 MG/1
10 TABLET ORAL DAILY
COMMUNITY
Start: 2017-03-01

## 2023-05-18 RX ORDER — LEVOTHYROXINE SODIUM 88 UG/1
88 TABLET ORAL
COMMUNITY
Start: 2019-10-07

## 2023-05-18 RX ORDER — MINERAL OIL 100 G/100G
OIL RECTAL PRN
COMMUNITY

## 2023-05-18 RX ORDER — ACETAMINOPHEN 500 MG
500 TABLET ORAL 3 TIMES DAILY
COMMUNITY

## 2023-05-18 RX ORDER — LOPERAMIDE HYDROCHLORIDE 2 MG/1
CAPSULE ORAL
COMMUNITY

## 2023-05-18 RX ORDER — CHOLESTYRAMINE LIGHT 4 G/5.7G
4 POWDER, FOR SUSPENSION ORAL DAILY
COMMUNITY
Start: 2019-10-07

## 2023-05-18 RX ORDER — ALBUTEROL SULFATE 2.5 MG/3ML
2.5 SOLUTION RESPIRATORY (INHALATION) PRN
COMMUNITY

## 2023-05-18 RX ORDER — FINASTERIDE 5 MG/1
5 TABLET, FILM COATED ORAL DAILY
COMMUNITY
Start: 2016-02-22

## 2023-05-18 RX ORDER — CYANOCOBALAMIN 1000 UG/ML
1000 INJECTION, SOLUTION INTRAMUSCULAR; SUBCUTANEOUS
COMMUNITY

## 2023-05-18 RX ORDER — MAGNESIUM HYDROXIDE/ALUMINUM HYDROXICE/SIMETHICONE 120; 1200; 1200 MG/30ML; MG/30ML; MG/30ML
30 SUSPENSION ORAL EVERY 4 HOURS PRN
COMMUNITY

## 2023-05-18 RX ORDER — ACETAMINOPHEN 650 MG/1
650 SUPPOSITORY RECTAL EVERY 4 HOURS PRN
COMMUNITY

## 2023-05-18 RX ORDER — NITROGLYCERIN 0.4 MG/1
TABLET SUBLINGUAL
COMMUNITY

## 2023-05-18 RX ORDER — PANTOPRAZOLE SODIUM 40 MG/1
40 TABLET, DELAYED RELEASE ORAL
COMMUNITY
Start: 2019-10-07

## 2023-05-18 RX ORDER — ATORVASTATIN CALCIUM 80 MG/1
80 TABLET, FILM COATED ORAL DAILY
COMMUNITY
Start: 2016-11-29

## (undated) DEVICE — SOLUTION IRRIG 1000ML H2O STRL BLT

## (undated) DEVICE — 3000CC GUARDIAN II: Brand: GUARDIAN

## (undated) DEVICE — STERILE POLYISOPRENE POWDER-FREE SURGICAL GLOVES WITH EMOLLIENT COATING: Brand: PROTEXIS

## (undated) DEVICE — HANDLE LT SNAP ON ULT DURABLE LENS FOR TRUMPF ALC DISPOSABLE

## (undated) DEVICE — 3M™ IOBAN™ 2 ANTIMICROBIAL INCISE DRAPE 6651EZ: Brand: IOBAN™ 2

## (undated) DEVICE — SUTURE ETHBND EXCEL SZ 2 L30IN NONABSORBABLE GRN L40MM V-37 MX69G

## (undated) DEVICE — FORCEPS BX L160CM DIA8MM GRSP DISECT CUP TIP NONLOCKING ROT

## (undated) DEVICE — Device

## (undated) DEVICE — TRAP SUC MUCOUS 70ML -- MEDICHOICE MEDLINE

## (undated) DEVICE — SUTURE VCRL SZ 1 L18IN ABSRB VLT CT-1 L36MM 1/2 CIR J741D

## (undated) DEVICE — SOLUTION IRRIG 3000ML 0.9% SOD CHL FLX CONT 0797208] ICU MEDICAL INC]

## (undated) DEVICE — REM POLYHESIVE ADULT PATIENT RETURN ELECTRODE: Brand: VALLEYLAB

## (undated) DEVICE — BLOCK BITE ENDOSCP AD 21 MM W/ DIL BLU LF DISP

## (undated) DEVICE — (D)PREP SKN CHLRAPRP APPL 26ML -- CONVERT TO ITEM 371833

## (undated) DEVICE — STRAP POS KNEE BODY VELC

## (undated) DEVICE — SNARE ENDOSCP M L240CM W27MM SHTH DIA2.4MM CHN 2.8MM OVL

## (undated) DEVICE — 1200 GUARD II KIT W/5MM TUBE W/O VAC TUBE: Brand: GUARDIAN

## (undated) DEVICE — TOWEL 4 PLY TISS 19X30 SUE WHT

## (undated) DEVICE — NEONATAL-ADULT SPO2 SENSOR: Brand: NELLCOR

## (undated) DEVICE — SUTURE FIBERWIRE SZ 2 W/ TAPERED NEEDLE BLUE L38IN NONABSORB BLU L26.5MM 1/2 CIRCLE AR7200

## (undated) DEVICE — NEEDLE HYPO 18GA L1.5IN PNK S STL HUB POLYPR SHLD REG BVL

## (undated) DEVICE — WATERPROOF, BACTERIA PROOF DRESSING WITH ABSORBENT SEE THROUGH PAD: Brand: OPSITE POST-OP VISIBLE 20X10CM CTN 20

## (undated) DEVICE — SPONGE LAP 18X18IN STRL -- 5/PK

## (undated) DEVICE — STERILE POLYISOPRENE POWDER-FREE SURGICAL GLOVES: Brand: PROTEXIS

## (undated) DEVICE — SYR 3ML LL TIP 1/10ML GRAD --

## (undated) DEVICE — SUTURE VCRL SZ 2-0 L18IN ABSRB UD CT-1 L36MM 1/2 CIR J839D

## (undated) DEVICE — BASIN EMSIS 16OZ GRAPHITE PLAS KID SHP MOLD GRAD FOR ORAL

## (undated) DEVICE — SOLUTION IV 1000ML 0.9% SOD CHL

## (undated) DEVICE — Z DISCONTINUED PER MEDLINE LINE GAS SAMPLING O2/CO2 LNG AD 13 FT NSL W/ TBNG FILTERLINE

## (undated) DEVICE — NON-REM POLYHESIVE PATIENT RETURN ELECTRODE: Brand: VALLEYLAB

## (undated) DEVICE — HANDPIECE SET WITH COAXIAL HIGH FLOW TIP AND SUCTION TUBE: Brand: INTERPULSE

## (undated) DEVICE — 3M™ STERI-DRAPE™ INSTRUMENT POUCH 1018: Brand: STERI-DRAPE™

## (undated) DEVICE — MEDI-VAC YANK SUCT HNDL W/TPRD BULBOUS TIP: Brand: CARDINAL HEALTH

## (undated) DEVICE — CONTAINER SPEC 20 ML LID NEUT BUFF FORMALIN 10 % POLYPR STS

## (undated) DEVICE — 2108 SERIES SAGITTAL BLADE (24.8 X 0.88 X 73.8MM)

## (undated) DEVICE — SET ADMIN 16ML TBNG L100IN 2 Y INJ SITE IV PIGGY BK DISP

## (undated) DEVICE — GOWN,SIRUS,NONRNF,SETINSLV,2XL,18/CS: Brand: MEDLINE

## (undated) DEVICE — DRAPE,REIN 53X77,STERILE: Brand: MEDLINE

## (undated) DEVICE — CATH IV AUTOGRD BC PNK 20GA 25 -- INSYTE

## (undated) DEVICE — SOLIDIFIER MEDC 1200ML -- CONVERT TO 356117

## (undated) DEVICE — MEDI-VAC SUCTION HIGH CAPACITY: Brand: CARDINAL HEALTH

## (undated) DEVICE — KENDALL RADIOLUCENT FOAM MONITORING ELECTRODE RECTANGULAR SHAPE: Brand: KENDALL

## (undated) DEVICE — SLIM BODY SKIN STAPLER: Brand: APPOSE ULC

## (undated) DEVICE — INFECTION CONTROL KIT SYS

## (undated) DEVICE — BAG SPEC BIOHZRD 10 X 10 IN --

## (undated) DEVICE — STRAINER URIN CALC RNL MSH -- CONVERT TO ITEM 357634

## (undated) DEVICE — SYR 10ML LUER LOK 1/5ML GRAD --